# Patient Record
Sex: FEMALE | Race: BLACK OR AFRICAN AMERICAN | NOT HISPANIC OR LATINO | URBAN - METROPOLITAN AREA
[De-identification: names, ages, dates, MRNs, and addresses within clinical notes are randomized per-mention and may not be internally consistent; named-entity substitution may affect disease eponyms.]

---

## 2018-02-12 ENCOUNTER — EMERGENCY (EMERGENCY)
Facility: HOSPITAL | Age: 43
LOS: 0 days | Discharge: HOME | End: 2018-02-13
Attending: EMERGENCY MEDICINE
Payer: SELF-PAY

## 2018-02-12 VITALS
TEMPERATURE: 100 F | RESPIRATION RATE: 18 BRPM | DIASTOLIC BLOOD PRESSURE: 97 MMHG | OXYGEN SATURATION: 98 % | HEART RATE: 110 BPM | SYSTOLIC BLOOD PRESSURE: 151 MMHG

## 2018-02-12 VITALS
HEART RATE: 100 BPM | SYSTOLIC BLOOD PRESSURE: 155 MMHG | OXYGEN SATURATION: 100 % | WEIGHT: 199.96 LBS | RESPIRATION RATE: 18 BRPM | TEMPERATURE: 99 F | HEIGHT: 66 IN | DIASTOLIC BLOOD PRESSURE: 111 MMHG

## 2018-02-12 DIAGNOSIS — B34.9 VIRAL INFECTION, UNSPECIFIED: ICD-10-CM

## 2018-02-12 DIAGNOSIS — R05 COUGH: ICD-10-CM

## 2018-02-12 DIAGNOSIS — I10 ESSENTIAL (PRIMARY) HYPERTENSION: ICD-10-CM

## 2018-02-12 DIAGNOSIS — R50.9 FEVER, UNSPECIFIED: ICD-10-CM

## 2018-02-12 DIAGNOSIS — J02.9 ACUTE PHARYNGITIS, UNSPECIFIED: ICD-10-CM

## 2018-02-12 DIAGNOSIS — E11.9 TYPE 2 DIABETES MELLITUS WITHOUT COMPLICATIONS: ICD-10-CM

## 2018-02-12 LAB
APPEARANCE UR: CLEAR — SIGNIFICANT CHANGE UP
BILIRUB UR-MCNC: NEGATIVE — SIGNIFICANT CHANGE UP
COLOR SPEC: YELLOW — SIGNIFICANT CHANGE UP
DIFF PNL FLD: NEGATIVE — SIGNIFICANT CHANGE UP
FLUAV SPEC QL CULT: NEGATIVE — SIGNIFICANT CHANGE UP
FLUBV AG SPEC QL IA: NEGATIVE — SIGNIFICANT CHANGE UP
GLUCOSE UR QL: 1000 MG/DL
HCG UR QL: NEGATIVE — SIGNIFICANT CHANGE UP
KETONES UR-MCNC: NEGATIVE — SIGNIFICANT CHANGE UP
LEUKOCYTE ESTERASE UR-ACNC: NEGATIVE — SIGNIFICANT CHANGE UP
NITRITE UR-MCNC: NEGATIVE — SIGNIFICANT CHANGE UP
PH UR: 6.5 — SIGNIFICANT CHANGE UP (ref 5–8)
PROT UR-MCNC: 100 MG/DL
SP GR SPEC: 1.01 — SIGNIFICANT CHANGE UP (ref 1.01–1.02)
UROBILINOGEN FLD QL: NEGATIVE MG/DL — SIGNIFICANT CHANGE UP

## 2018-02-12 PROCEDURE — 99284 EMERGENCY DEPT VISIT MOD MDM: CPT

## 2018-02-12 PROCEDURE — 71046 X-RAY EXAM CHEST 2 VIEWS: CPT | Mod: 26

## 2018-02-12 RX ORDER — DEXAMETHASONE 0.5 MG/5ML
10 ELIXIR ORAL ONCE
Qty: 0 | Refills: 0 | Status: COMPLETED | OUTPATIENT
Start: 2018-02-12 | End: 2018-02-12

## 2018-02-12 RX ORDER — IBUPROFEN 200 MG
600 TABLET ORAL ONCE
Qty: 0 | Refills: 0 | Status: COMPLETED | OUTPATIENT
Start: 2018-02-12 | End: 2018-02-12

## 2018-02-12 RX ADMIN — Medication 10 MILLIGRAM(S): at 23:03

## 2018-02-12 RX ADMIN — Medication 75 MILLIGRAM(S): at 23:02

## 2018-02-13 LAB
BACTERIA # UR AUTO: ABNORMAL
EPI CELLS # UR: SIGNIFICANT CHANGE UP
HYALINE CASTS # UR AUTO: ABNORMAL /LPF
RBC CASTS # UR COMP ASSIST: SIGNIFICANT CHANGE UP /HPF (ref 0–4)
WBC UR QL: SIGNIFICANT CHANGE UP

## 2018-02-13 NOTE — ED PROVIDER NOTE - MEDICAL DECISION MAKING DETAILS
Labs and imaging reviewed. Patient received tylenol, decadron and tamiflu. She currently feels well and wants to go home. Patient now discharged with care instructions. Patient is to follow up with pmd. Discussed results and outcome of testing with the patient.  Patient advised to please follow up with their primary care doctor within the next 24 hours and return to the Emergency Department for worsening symptoms or any other concerns.  Patient advised that their doctor may call  to follow up on the specific results of the tests performed today in the emergency department.

## 2018-02-13 NOTE — ED PROVIDER NOTE - OBJECTIVE STATEMENT
Pertinent PMH/PSH/FHx/SHx and Review of Systems contained within:  41 yo f with PMH of DM and HTN presents in ED c/o fevers, chills, cough, sore throat and bodyaches. No aggravating or relieving factors, No photophobia/eye pain/changes in vision, No ear pain/dysphagia, No chest pain/palpitations, no SOB/wheeze/stridor, No abdominal pain, No N/V/D, no dysuria/frequency/discharge, No neck/back pain, no rash, no changes in neurological status/function.

## 2018-02-15 LAB
-  AMIKACIN: SIGNIFICANT CHANGE UP
-  AMIKACIN: SIGNIFICANT CHANGE UP
-  AMPICILLIN/SULBACTAM: SIGNIFICANT CHANGE UP
-  AMPICILLIN/SULBACTAM: SIGNIFICANT CHANGE UP
-  AMPICILLIN: SIGNIFICANT CHANGE UP
-  AMPICILLIN: SIGNIFICANT CHANGE UP
-  AZTREONAM: SIGNIFICANT CHANGE UP
-  AZTREONAM: SIGNIFICANT CHANGE UP
-  CEFAZOLIN: SIGNIFICANT CHANGE UP
-  CEFAZOLIN: SIGNIFICANT CHANGE UP
-  CEFEPIME: SIGNIFICANT CHANGE UP
-  CEFEPIME: SIGNIFICANT CHANGE UP
-  CEFOXITIN: SIGNIFICANT CHANGE UP
-  CEFOXITIN: SIGNIFICANT CHANGE UP
-  CEFTAZIDIME: SIGNIFICANT CHANGE UP
-  CEFTAZIDIME: SIGNIFICANT CHANGE UP
-  CEFTRIAXONE: SIGNIFICANT CHANGE UP
-  CEFTRIAXONE: SIGNIFICANT CHANGE UP
-  CIPROFLOXACIN: SIGNIFICANT CHANGE UP
-  CIPROFLOXACIN: SIGNIFICANT CHANGE UP
-  ERTAPENEM: SIGNIFICANT CHANGE UP
-  ERTAPENEM: SIGNIFICANT CHANGE UP
-  GENTAMICIN: SIGNIFICANT CHANGE UP
-  GENTAMICIN: SIGNIFICANT CHANGE UP
-  IMIPENEM: SIGNIFICANT CHANGE UP
-  IMIPENEM: SIGNIFICANT CHANGE UP
-  LEVOFLOXACIN: SIGNIFICANT CHANGE UP
-  LEVOFLOXACIN: SIGNIFICANT CHANGE UP
-  MEROPENEM: SIGNIFICANT CHANGE UP
-  MEROPENEM: SIGNIFICANT CHANGE UP
-  NITROFURANTOIN: SIGNIFICANT CHANGE UP
-  NITROFURANTOIN: SIGNIFICANT CHANGE UP
-  PIPERACILLIN/TAZOBACTAM: SIGNIFICANT CHANGE UP
-  PIPERACILLIN/TAZOBACTAM: SIGNIFICANT CHANGE UP
-  TOBRAMYCIN: SIGNIFICANT CHANGE UP
-  TOBRAMYCIN: SIGNIFICANT CHANGE UP
-  TRIMETHOPRIM/SULFAMETHOXAZOLE: SIGNIFICANT CHANGE UP
-  TRIMETHOPRIM/SULFAMETHOXAZOLE: SIGNIFICANT CHANGE UP
CULTURE RESULTS: SIGNIFICANT CHANGE UP
METHOD TYPE: SIGNIFICANT CHANGE UP
METHOD TYPE: SIGNIFICANT CHANGE UP
ORGANISM # SPEC MICROSCOPIC CNT: SIGNIFICANT CHANGE UP
SPECIMEN SOURCE: SIGNIFICANT CHANGE UP

## 2020-02-28 ENCOUNTER — EMERGENCY (EMERGENCY)
Facility: HOSPITAL | Age: 45
LOS: 1 days | Discharge: HOME | End: 2020-02-28
Attending: EMERGENCY MEDICINE
Payer: SELF-PAY

## 2020-02-28 VITALS
DIASTOLIC BLOOD PRESSURE: 99 MMHG | SYSTOLIC BLOOD PRESSURE: 200 MMHG | HEART RATE: 99 BPM | OXYGEN SATURATION: 98 % | WEIGHT: 218.04 LBS | RESPIRATION RATE: 16 BRPM | TEMPERATURE: 98 F

## 2020-02-28 VITALS — SYSTOLIC BLOOD PRESSURE: 185 MMHG | DIASTOLIC BLOOD PRESSURE: 128 MMHG | HEART RATE: 93 BPM

## 2020-02-28 PROBLEM — E11.9 TYPE 2 DIABETES MELLITUS WITHOUT COMPLICATIONS: Chronic | Status: ACTIVE | Noted: 2018-02-12

## 2020-02-28 PROBLEM — I10 ESSENTIAL (PRIMARY) HYPERTENSION: Chronic | Status: ACTIVE | Noted: 2018-02-12

## 2020-02-28 PROCEDURE — 99283 EMERGENCY DEPT VISIT LOW MDM: CPT

## 2020-02-28 PROCEDURE — 73140 X-RAY EXAM OF FINGER(S): CPT | Mod: 26,LT

## 2020-02-28 PROCEDURE — 73140 X-RAY EXAM OF FINGER(S): CPT

## 2020-02-28 PROCEDURE — 99283 EMERGENCY DEPT VISIT LOW MDM: CPT | Mod: 25

## 2020-02-28 RX ORDER — ACETAMINOPHEN 500 MG
975 TABLET ORAL ONCE
Refills: 0 | Status: COMPLETED | OUTPATIENT
Start: 2020-02-28 | End: 2020-02-28

## 2020-02-28 RX ADMIN — Medication 975 MILLIGRAM(S): at 13:57

## 2020-02-28 RX ADMIN — Medication 975 MILLIGRAM(S): at 13:27

## 2020-02-28 NOTE — ED PROVIDER NOTE - CROS ED NEURO NEG
no numbness, no tingling no difficulty walking/imbalance/no seizures/no change in level of consciousness/no headache/no numbness, no tingling

## 2020-02-28 NOTE — ED PROVIDER NOTE - MUSCULOSKELETAL MINIMAL EXAM
swelling and pain to distal aspect of L 3rd finger, tenderness to palpation over distal phalanx of L 3rd finger, no lac or disruption of nail bed, pt able to flex and extend finger against resistance

## 2020-02-28 NOTE — ED PROVIDER NOTE - OBJECTIVE STATEMENT
43 y/o female with PMHx of HTN, DM, peptic ulcer disease presents to the ED c/o L 3rd finger pain x today. Pt notes she was cleaning the armrest of a plane when the armrest came down and crushed hi L 3rd finger, causing pain. Pt notes severe pain to the distal aspect of her L 3rd finger. No other injuries noted. Pt denies numbness, tingling, or any other complaints. NKDA. 43 y/o female with PMHx of HTN, DM, peptic ulcer disease presents to the ED c/o L 3rd finger pain x today. Pt notes she was cleaning the armrest of a plane when the armrest came down and crushed her L 3rd finger, causing pain. Pt notes severe pain to the distal aspect of her L 3rd finger. No other injuries noted. Pt denies numbness, tingling, or any other complaints. NKDA.

## 2020-02-28 NOTE — ED PROVIDER NOTE - PROGRESS NOTE DETAILS
Patient nontoxic and medically stable for discharge. Results discussed with patient. Return precautions provided and patient understands to return to the ED for concerning or worsening signs and symptoms. Instructed to follow up with primary care physician and agreeable. Patient's questions answered. Patient noted to be hypertensive, but asymptomatic and requesting discharge.  Patient reports pain is better but states "I have bad white coat syndrome".  Will have patient follow-up with PCP for BP control.

## 2020-02-28 NOTE — ED PROVIDER NOTE - NSFOLLOWUPINSTRUCTIONS_ED_ALL_ED_FT
Please follow-up with your primary care provider and return to ED for uncontrolled pain, new or worrisome symptoms.

## 2020-02-28 NOTE — ED PROVIDER NOTE - ATTENDING CONTRIBUTION TO CARE
I completed an independent physical examination.   I have signed out the follow up of any pending tests (i.e. labs, radiological studies) to the PA/NP.  I have discussed the patient’s plan of care and disposition with the PA/NP    Patient with crush injury to 3rd finger. X-ray, analgesia.

## 2020-02-28 NOTE — ED PROVIDER NOTE - PATIENT PORTAL LINK FT
You can access the FollowMyHealth Patient Portal offered by [facility] by registering at the following website: [email or web address]. By joining [facility]’s FollowMyHealth portal, you will also be able to view your health information using other applications (apps) compatible with our system.

## 2022-03-26 ENCOUNTER — INPATIENT (INPATIENT)
Facility: HOSPITAL | Age: 47
LOS: 2 days | Discharge: LEFT BEFORE DISCHARGE | End: 2022-03-29
Attending: STUDENT IN AN ORGANIZED HEALTH CARE EDUCATION/TRAINING PROGRAM | Admitting: STUDENT IN AN ORGANIZED HEALTH CARE EDUCATION/TRAINING PROGRAM
Payer: MEDICAID

## 2022-03-26 VITALS
SYSTOLIC BLOOD PRESSURE: 117 MMHG | WEIGHT: 205.03 LBS | TEMPERATURE: 97 F | HEIGHT: 66 IN | HEART RATE: 93 BPM | DIASTOLIC BLOOD PRESSURE: 72 MMHG | RESPIRATION RATE: 17 BRPM | OXYGEN SATURATION: 95 %

## 2022-03-26 PROBLEM — K27.9 PEPTIC ULCER, SITE UNSPECIFIED, UNSPECIFIED AS ACUTE OR CHRONIC, WITHOUT HEMORRHAGE OR PERFORATION: Chronic | Status: ACTIVE | Noted: 2020-02-28

## 2022-03-26 LAB
ALBUMIN SERPL ELPH-MCNC: 2.7 G/DL — LOW (ref 3.3–5)
ALP SERPL-CCNC: 92 U/L — SIGNIFICANT CHANGE UP (ref 40–120)
ALT FLD-CCNC: 30 U/L — SIGNIFICANT CHANGE UP (ref 12–78)
ANION GAP SERPL CALC-SCNC: 5 MMOL/L — SIGNIFICANT CHANGE UP (ref 5–17)
APPEARANCE UR: CLEAR — SIGNIFICANT CHANGE UP
APTT BLD: 29.7 SEC — SIGNIFICANT CHANGE UP (ref 27.5–35.5)
AST SERPL-CCNC: 22 U/L — SIGNIFICANT CHANGE UP (ref 15–37)
BASOPHILS # BLD AUTO: 0.03 K/UL — SIGNIFICANT CHANGE UP (ref 0–0.2)
BASOPHILS NFR BLD AUTO: 0.3 % — SIGNIFICANT CHANGE UP (ref 0–2)
BILIRUB SERPL-MCNC: 0.8 MG/DL — SIGNIFICANT CHANGE UP (ref 0.2–1.2)
BILIRUB UR-MCNC: NEGATIVE — SIGNIFICANT CHANGE UP
BUN SERPL-MCNC: 36 MG/DL — HIGH (ref 7–23)
CALCIUM SERPL-MCNC: 8.7 MG/DL — SIGNIFICANT CHANGE UP (ref 8.5–10.1)
CHLORIDE SERPL-SCNC: 106 MMOL/L — SIGNIFICANT CHANGE UP (ref 96–108)
CO2 SERPL-SCNC: 29 MMOL/L — SIGNIFICANT CHANGE UP (ref 22–31)
COLOR SPEC: YELLOW — SIGNIFICANT CHANGE UP
CREAT SERPL-MCNC: 1.84 MG/DL — HIGH (ref 0.5–1.3)
D DIMER BLD IA.RAPID-MCNC: 678 NG/ML DDU — HIGH
DIFF PNL FLD: NEGATIVE — SIGNIFICANT CHANGE UP
EGFR: 34 ML/MIN/1.73M2 — LOW
EOSINOPHIL # BLD AUTO: 0.1 K/UL — SIGNIFICANT CHANGE UP (ref 0–0.5)
EOSINOPHIL NFR BLD AUTO: 0.9 % — SIGNIFICANT CHANGE UP (ref 0–6)
EPI CELLS # UR: SIGNIFICANT CHANGE UP
FLUAV AG NPH QL: SIGNIFICANT CHANGE UP
FLUBV AG NPH QL: SIGNIFICANT CHANGE UP
GLUCOSE SERPL-MCNC: 164 MG/DL — HIGH (ref 70–99)
GLUCOSE UR QL: NEGATIVE MG/DL — SIGNIFICANT CHANGE UP
HCG SERPL-ACNC: 1 MIU/ML — SIGNIFICANT CHANGE UP
HCT VFR BLD CALC: 30.7 % — LOW (ref 34.5–45)
HGB BLD-MCNC: 10 G/DL — LOW (ref 11.5–15.5)
IMM GRANULOCYTES NFR BLD AUTO: 0.4 % — SIGNIFICANT CHANGE UP (ref 0–1.5)
INR BLD: 1.18 RATIO — HIGH (ref 0.88–1.16)
KETONES UR-MCNC: NEGATIVE — SIGNIFICANT CHANGE UP
LEUKOCYTE ESTERASE UR-ACNC: NEGATIVE — SIGNIFICANT CHANGE UP
LIDOCAIN IGE QN: 65 U/L — LOW (ref 73–393)
LYMPHOCYTES # BLD AUTO: 2.94 K/UL — SIGNIFICANT CHANGE UP (ref 1–3.3)
LYMPHOCYTES # BLD AUTO: 27 % — SIGNIFICANT CHANGE UP (ref 13–44)
MCHC RBC-ENTMCNC: 24 PG — LOW (ref 27–34)
MCHC RBC-ENTMCNC: 32.6 G/DL — SIGNIFICANT CHANGE UP (ref 32–36)
MCV RBC AUTO: 73.6 FL — LOW (ref 80–100)
MONOCYTES # BLD AUTO: 0.49 K/UL — SIGNIFICANT CHANGE UP (ref 0–0.9)
MONOCYTES NFR BLD AUTO: 4.5 % — SIGNIFICANT CHANGE UP (ref 2–14)
NEUTROPHILS # BLD AUTO: 7.27 K/UL — SIGNIFICANT CHANGE UP (ref 1.8–7.4)
NEUTROPHILS NFR BLD AUTO: 66.9 % — SIGNIFICANT CHANGE UP (ref 43–77)
NITRITE UR-MCNC: NEGATIVE — SIGNIFICANT CHANGE UP
NRBC # BLD: 0 /100 WBCS — SIGNIFICANT CHANGE UP (ref 0–0)
NT-PROBNP SERPL-SCNC: HIGH PG/ML (ref 0–125)
PH UR: 6 — SIGNIFICANT CHANGE UP (ref 5–8)
PLATELET # BLD AUTO: 257 K/UL — SIGNIFICANT CHANGE UP (ref 150–400)
POTASSIUM SERPL-MCNC: 4.2 MMOL/L — SIGNIFICANT CHANGE UP (ref 3.5–5.3)
POTASSIUM SERPL-SCNC: 4.2 MMOL/L — SIGNIFICANT CHANGE UP (ref 3.5–5.3)
PROT SERPL-MCNC: 7 GM/DL — SIGNIFICANT CHANGE UP (ref 6–8.3)
PROT UR-MCNC: 30 MG/DL
PROTHROM AB SERPL-ACNC: 14.1 SEC — HIGH (ref 10.5–13.4)
RBC # BLD: 4.17 M/UL — SIGNIFICANT CHANGE UP (ref 3.8–5.2)
RBC # FLD: 19 % — HIGH (ref 10.3–14.5)
SARS-COV-2 RNA SPEC QL NAA+PROBE: SIGNIFICANT CHANGE UP
SODIUM SERPL-SCNC: 140 MMOL/L — SIGNIFICANT CHANGE UP (ref 135–145)
SP GR SPEC: 1.01 — SIGNIFICANT CHANGE UP (ref 1.01–1.02)
TROPONIN I, HIGH SENSITIVITY RESULT: 46.6 NG/L — SIGNIFICANT CHANGE UP
UROBILINOGEN FLD QL: NEGATIVE MG/DL — SIGNIFICANT CHANGE UP
WBC # BLD: 10.87 K/UL — HIGH (ref 3.8–10.5)
WBC # FLD AUTO: 10.87 K/UL — HIGH (ref 3.8–10.5)

## 2022-03-26 PROCEDURE — 93010 ELECTROCARDIOGRAM REPORT: CPT

## 2022-03-26 PROCEDURE — 99285 EMERGENCY DEPT VISIT HI MDM: CPT

## 2022-03-26 PROCEDURE — 74176 CT ABD & PELVIS W/O CONTRAST: CPT | Mod: 26,MA

## 2022-03-26 PROCEDURE — 71045 X-RAY EXAM CHEST 1 VIEW: CPT | Mod: 26

## 2022-03-26 PROCEDURE — 76856 US EXAM PELVIC COMPLETE: CPT | Mod: 26

## 2022-03-26 PROCEDURE — 71250 CT THORAX DX C-: CPT | Mod: 26,MA

## 2022-03-26 PROCEDURE — 99223 1ST HOSP IP/OBS HIGH 75: CPT

## 2022-03-26 RX ORDER — FUROSEMIDE 40 MG
40 TABLET ORAL DAILY
Refills: 0 | Status: DISCONTINUED | OUTPATIENT
Start: 2022-03-27 | End: 2022-03-29

## 2022-03-26 RX ORDER — HEPARIN SODIUM 5000 [USP'U]/ML
5000 INJECTION INTRAVENOUS; SUBCUTANEOUS EVERY 12 HOURS
Refills: 0 | Status: DISCONTINUED | OUTPATIENT
Start: 2022-03-26 | End: 2022-03-28

## 2022-03-26 RX ORDER — MORPHINE SULFATE 50 MG/1
4 CAPSULE, EXTENDED RELEASE ORAL ONCE
Refills: 0 | Status: DISCONTINUED | OUTPATIENT
Start: 2022-03-26 | End: 2022-03-26

## 2022-03-26 RX ORDER — FUROSEMIDE 40 MG
80 TABLET ORAL ONCE
Refills: 0 | Status: COMPLETED | OUTPATIENT
Start: 2022-03-26 | End: 2022-03-26

## 2022-03-26 RX ORDER — FAMOTIDINE 10 MG/ML
20 INJECTION INTRAVENOUS ONCE
Refills: 0 | Status: COMPLETED | OUTPATIENT
Start: 2022-03-26 | End: 2022-03-26

## 2022-03-26 RX ADMIN — Medication 2 MILLIGRAM(S): at 16:09

## 2022-03-26 RX ADMIN — Medication 1 MILLIGRAM(S): at 14:47

## 2022-03-26 RX ADMIN — Medication 30 MILLILITER(S): at 12:24

## 2022-03-26 RX ADMIN — FAMOTIDINE 20 MILLIGRAM(S): 10 INJECTION INTRAVENOUS at 12:24

## 2022-03-26 RX ADMIN — Medication 80 MILLIGRAM(S): at 14:42

## 2022-03-26 RX ADMIN — MORPHINE SULFATE 4 MILLIGRAM(S): 50 CAPSULE, EXTENDED RELEASE ORAL at 12:24

## 2022-03-26 NOTE — ED ADULT NURSE REASSESSMENT NOTE - NS ED NURSE REASSESS COMMENT FT1
mg of IV Ativan given to Dr. Dubois for this patient_  went to [facility] with medication.

## 2022-03-26 NOTE — ED PROVIDER NOTE - NS ED ATTENDING STATEMENT MOD
Attending with This was a shared visit with the TR. I reviewed and verified the documentation and independently performed the documented:

## 2022-03-26 NOTE — ED PROVIDER NOTE - PHYSICAL EXAMINATION
PE:   GEN: Awake, alert, interactive,  HEAD AND NECK: NC/AT. Airway patent. Neck supple.   EYES: Clear b/l. PERRL  CARDIAC: RRR. S1, S2. No evident pedal edema.    RESP: Tachypneic. Clear throughout on auscultation.  ABD: soft, non-distended, + epigastric ttp. No rebound, no guarding.   NEURO: AOx3, CN II-XII grossly intact, no focal deficits.   MSK: Moving all extremities with no apparent deformities.   SKIN: Warm, dry, intact normal color

## 2022-03-26 NOTE — H&P ADULT - NSHPPHYSICALEXAM_GEN_ALL_CORE
PHYSICAL EXAMINATION:  Vital Signs Last 24 Hrs  T(C): 36.2 (26 Mar 2022 11:32), Max: 36.2 (26 Mar 2022 11:32)  T(F): 97.2 (26 Mar 2022 11:32), Max: 97.2 (26 Mar 2022 11:32)  HR: 93 (26 Mar 2022 11:32) (93 - 93)  BP: 129/86 (26 Mar 2022 14:38) (117/72 - 129/86)  BP(mean): --  RR: 17 (26 Mar 2022 11:32) (17 - 17)  SpO2: 95% (26 Mar 2022 11:32) (95% - 95%)  CAPILLARY BLOOD GLUCOSE          GENERAL: NAD, well-groomed, well-developed  HEAD:  atraumatic, normocephalic  EYES: sclera anicteric  ENMT: mucous membranes moist  NECK: supple, No JVD  CHEST/LUNG: clear to auscultation bilaterally; no rales, rhonchi, or wheezing b/l  HEART: normal S1, S2  ABDOMEN: BS+, soft, ND, NT   EXTREMITIES:  pulses palpable; no clubbing, cyanosis, or edema b/l LEs  NEURO: awake, alert, interactive; moves all extremities  SKIN: no rashes or lesions PHYSICAL EXAMINATION:  Vital Signs Last 24 Hrs  T(C): 36.2 (26 Mar 2022 11:32), Max: 36.2 (26 Mar 2022 11:32)  T(F): 97.2 (26 Mar 2022 11:32), Max: 97.2 (26 Mar 2022 11:32)  HR: 93 (26 Mar 2022 11:32) (93 - 93)  BP: 129/86 (26 Mar 2022 14:38) (117/72 - 129/86)  BP(mean): --  RR: 17 (26 Mar 2022 11:32) (17 - 17)  SpO2: 95% (26 Mar 2022 11:32) (95% - 95%)  CAPILLARY BLOOD GLUCOSE          GENERAL: NAD, seen on 1-D, comfortable, sedated after pelvic sonogram.   HEAD:  atraumatic, normocephalic  EYES: sclera anicteric  ENMT: mucous membranes moist  NECK: supple, No JVD  CHEST/LUNG: clear to auscultation bilaterally; no rales, rhonchi, or wheezing b/l  HEART: normal S1, S2  ABDOMEN: BS+, soft, ND, NT   EXTREMITIES:  pulses palpable; no clubbing, cyanosis, or edema b/l LEs  NEURO: awake, alert, interactive; moves all extremities  SKIN: no rashes or lesions

## 2022-03-26 NOTE — PATIENT PROFILE ADULT - FALL HARM RISK - UNIVERSAL INTERVENTIONS
Bed in lowest position, wheels locked, appropriate side rails in place/Call bell, personal items and telephone in reach/Instruct patient to call for assistance before getting out of bed or chair/Non-slip footwear when patient is out of bed/[place] to call system/Physically safe environment - no spills, clutter or unnecessary equipment/Purposeful Proactive Rounding/Room/bathroom lighting operational, light cord in reach

## 2022-03-26 NOTE — ED PROVIDER NOTE - CLINICAL SUMMARY MEDICAL DECISION MAKING FREE TEXT BOX
45yo female with pmh of DM, HTN, CHF presents complaining of intermittent sharp 10/10 epigastric pain x 2 months. Afebrile. + epigastric ttp - concerning for gastritis vs pud. SOB and tachypneic concerning for cardiac etiology - will get trop and BNP. Dispo pending work up

## 2022-03-26 NOTE — ED PROVIDER NOTE - OBJECTIVE STATEMENT
47yo female with pmh of DM, HTN, CHF presents complaining of intermittent sharp 10/10 epigastric pain x 2 months. Denies provoking or alleviating factors. Has not seen a doctor for it yet. Reports that over the last three days the pain has been constant and associated with sob prompting ED visit. Reports also having orthopnea over the last two days. Denies LE swelling, dizziness, n/v/d, bloody stools, constipation, fevers/chills and other associated sx.

## 2022-03-26 NOTE — ED ADULT NURSE NOTE - OBJECTIVE STATEMENT
abd pain since 01/15 no nausea or vomiting getting worse . pain In epigastric area constant pain, nonradiating  , difficulty breathing starting 3 day ago. hx dm chf htn

## 2022-03-26 NOTE — PATIENT PROFILE ADULT - OVER THE PAST TWO WEEKS, HAVE YOU FELT LITTLE INTEREST OR PLEASURE IN DOING THINGS?
MA attempt to contact patient to schedule follow up appointment patient canceled. MA reached patient VM and left detailed message of why was calling and office number for patient to call office back and set up appt.         Electronically signed by Michael Wright MA on 1/26/22 at 3:23 PM EST
no

## 2022-03-26 NOTE — ED PROVIDER NOTE - NS ED ROS FT
Constitutional: (-) Fever, (-) Anorexia, (-) Generalized Malaise  Eyes: (-)Discharge, (-) Irritation,  (-) Visual changes  EARS: (-) Ear Pain, (-) Apparent hearing changes  NOSE: (-) Congestion, (-) Bloody nose  MOUTH/THROAT: (-) Vocal Changes, (-) Drooling, (-) Sore throat  NECK: (-) Lumps, (-) Stiffness, (-) Pain  CV: (-) Chest Pain, (-) Palpitations, (-) Edema   RESP:  (-) Cough, (+) SOB, (-) HAINES,  (-) Wheezing  GI: (-) Nausea, (-) Vomiting, (+) Abdominal Pain, (-) Diarrhea, (-) Constipation, (-) Bloody stools  : (-) Dysuria, (-) Frequency, (-) Hematuria, (-) Incontinence  MSK: (-) Joint Pain, (-) Back Pain, (-) Deformities  SKIN: (-) Wounds, (-) Color change, (-)Rash, (-) Swelling  NEURO:(-) Headache, (-) Dizziness, (-) Numbness/Tingling,  (-)LOC

## 2022-03-26 NOTE — CONSULT NOTE ADULT - SUBJECTIVE AND OBJECTIVE BOX
Patient chart reviewed, full consult to follow.     Suspected cardiorenal syndrome;   Hx of CM EF 30% on lasix maintenance.       Thank you for the courtesy of this consultation. [facility] NE[facility], [facility]  NEPHROLOGY AND HYPERTENSION  [address]  [address]  [place] [address]  [phone]    MD RENEE VITAL MD ANDREY GONCHARUK, MD MADHU KORRAPATI, MD YELENA ROSENBERG, MD BINNY KOSHY, MD CHRISTOPHER CAPUTO, MD EDWARD BOVER, MD      Information from chart:  "Patient is a 46y old  Female who presents with a chief complaint of SOB and chest pain. (26 Mar 2022 15:23)    HPI:  45yo female PMH DM, HTN, CHF presents complaining of intermittent sharp 10/10 epigastric pain x 2 months. Denies provoking or alleviating factors. Has not seen a doctor for it yet. Reports that over the last three days the chest pain has been constant and associated with sob prompting ED visit. Reports also having orthopnea over the last two days. Denies LE swelling, dizziness, n/v/d, bloody stools, constipation, fevers/chills and other associated sx. (26 Mar 2022 15:23)   "      Patient with recent admission to [facility] ; Noted Echo EF 30%   Increasing SOB  On maintenance diuretic regimen   LE edema     PAST MEDICAL & SURGICAL HISTORY:  Hypertension 10 + years    Diabetes  4-5 years    Peptic ulcer disease      FAMILY HISTORY:    Allergies    lisinopril (Other)    Intolerances      Home Medications:    MEDICATIONS  (STANDING):  furosemide   Injectable 40 milliGRAM(s) IV Push daily  heparin   Injectable 5000 Unit(s) SubCutaneous every 12 hours    MEDICATIONS  (PRN):    Vital Signs Last 24 Hrs  T(C): 36.6 (26 Mar 2022 21:00), Max: 36.8 (26 Mar 2022 19:04)  T(F): 97.8 (26 Mar 2022 21:00), Max: 98.3 (26 Mar 2022 19:04)  HR: 91 (26 Mar 2022 21:00) (78 - 94)  BP: 139/99 (26 Mar 2022 21:00) (116/79 - 139/99)  BP(mean): 102 (26 Mar 2022 20:16) (102 - 102)  RR: 20 (26 Mar 2022 21:00) (17 - 20)  SpO2: 100% (26 Mar 2022 21:00) (95% - 100%)    Daily Height in cm: 167.64 (26 Mar 2022 11:32)    Daily     CAPILLARY BLOOD GLUCOSE        PHYSICAL EXAM:      T(C): 36.6 (22 @ 21:00), Max: 36.8 (22 @ 19:04)  HR: 91 (22 @ 21:00) (78 - 94)  BP: 139/99 (22 @ 21:00) (116/79 - 139/99)  RR: 20 (22 @ 21:00) (17 - 20)  SpO2: 100% (22 @ 21:00) (95% - 100%)  Wt(kg): --  Lungs clear decreased  BS at bases   Heart S1S2  Abd soft NT ND  Extremities:   1-2  edema                  140  |  106  |  36<H>  ----------------------------<  164<H>  4.2   |  29  |  1.84<H>    Ca    8.7      26 Mar 2022 13:02    TPro  7.0  /  Alb  2.7<L>  /  TBili  0.8  /  DBili  x   /  AST  22  /  ALT  30  /  AlkPhos  92                            10.0   10.87 )-----------( 257      ( 26 Mar 2022 13:02 )             30.7     Creatinine Trend: 1.84<--  Urinalysis Basic - ( 26 Mar 2022 20:37 )    Color: Yellow / Appearance: Clear / S.015 / pH: x  Gluc: x / Ketone: Negative  / Bili: Negative / Urobili: Negative mg/dL   Blood: x / Protein: 30 mg/dL / Nitrite: Negative   Leuk Esterase: Negative / RBC: x / WBC x   Sq Epi: x / Non Sq Epi: Few / Bacteria: x            Assessment   Suspected cardiorenal syndrome;   Hx of CM EF 30% on lasix maintenance.   Bow UA micro     Plan  Continued warranted diuresis;   Cardiology evaluation     Jamaal Elkins MD        Patient chart reviewed, full consult to follow.         Thank you for the courtesy of this consultation.

## 2022-03-26 NOTE — H&P ADULT - ASSESSMENT
47yo female PMH DM, HTN, CHF presents complaining of intermittent sharp 10/10 epigastric pain x 2 months. Denies provoking or alleviating factors. Has not seen a doctor for it yet. Reports that over the last three days the chest pain has been constant and associated with sob prompting ED visit. Reports also having orthopnea over the last two days. Denies LE swelling, dizziness, n/v/d, bloody stools, constipation, fevers/chills and other associated sx.    Plan: Admit to tele, likely systolic CHF exacerbation. Lasix 40 mg every AM, follow SMA-7 daily. TTE ordered. Renal consul reviewed in full and   appreciated. CXR is clear but CT chest notes PVC. BNP elevated on arrival at 11,890 consistent with CHF.     Renal : Creatinine appears at baseline 1.82, follow in AM.     DM: Glucose 162, SSC and A1C in AM.     Will need to confirm meds with patient in AM as she was sedated for pelvic sonogram.      47yo female PMH DM, HTN, CHF presents complaining of intermittent sharp 10/10 epigastric pain x 2 months. Denies provoking or alleviating factors. Has not seen a doctor for it yet. Reports that over the last three days the chest pain has been constant and associated with sob prompting ED visit. Reports also having orthopnea over the last two days. Denies LE swelling, dizziness, n/v/d, bloody stools, constipation, fevers/chills and other associated sx.    Plan: Admit to tele, likely systolic CHF exacerbation. Lasix 40 mg every AM, follow SMA-7 daily. TTE ordered. Renal consul reviewed in full and   appreciated. CXR is clear but CT chest notes PVC. BNP elevated on arrival at 11,890 consistent with CHF. Trop normal ranges at 46, follow in AM.     Renal : Creatinine appears at baseline 1.82, follow in AM.     DM: Glucose 162, SSC and A1C in AM.     Will need to confirm meds with patient in AM as she was sedated for pelvic sonogram, noted only pelvic cyst.

## 2022-03-26 NOTE — H&P ADULT - HISTORY OF PRESENT ILLNESS
45yo female PMH DM, HTN, CHF presents complaining of intermittent sharp 10/10 epigastric pain x 2 months. Denies provoking or alleviating factors. Has not seen a doctor for it yet. Reports that over the last three days the chest pain has been constant and associated with sob prompting ED visit. Reports also having orthopnea over the last two days. Denies LE swelling, dizziness, n/v/d, bloody stools, constipation, fevers/chills and other associated sx.

## 2022-03-26 NOTE — ED PROVIDER NOTE - CARE PLAN
1 Principal Discharge DX:	Abdominal pain   Principal Discharge DX:	Acute on chronic systolic congestive heart failure  Secondary Diagnosis:	Acute renal failure  Secondary Diagnosis:	Abdominal pain

## 2022-03-26 NOTE — ED PROVIDER NOTE - ATTENDING CONTRIBUTION TO CARE
46 years old female by ems c/o epigastric sharp abd pain without nausea vomiting, chest pain or back pain. and sometime increase with food intake for 2 months pt has a hx of cholecystectomy 15 years ago. Pt also c/o sob when she is lying down she has to sleep on three and half pillows x 4 days. Pt sts she has a hx of chf take lasix. Pt is speaking in clear full sentences no nasal flaring no shoulders retractions no diaphoresis, s1/s2 normal breath sounds are clear equal bilaterally, + tender to palp epigastric no santiago's sign and no sclera icterus bilaterally, calfs are nontender to palp bilateral calfs. I have personally seen and examined this pt I have fully participated in the care of this pt. I have made amendments to the documentation where appropriate and otherwise agree with the hx, exam, and plans as documented by the ACP. 46 years old female by ems c/o epigastric sharp abd pain without nausea vomiting, chest pain or back pain. and sometime increase with food intake for 2 months pt has a hx of cholecystectomy 15 years ago. Pt also c/o sob when she is lying down she has to sleep on three and half pillows x 4 days. Pt sts she has a hx of chf take lasix. Pt is speaking in clear full sentences no nasal flaring no shoulders retractions no diaphoresis, s1/s2 normal + right jvd, breath sounds + rales at base equal bilaterally, + tender to palp epigastric no santiago's sign and no sclera icterus bilaterally, calfs are nontender to palp bilateral calfs. I have personally seen and examined this pt I have fully participated in the care of this pt. I have made amendments to the documentation where appropriate and otherwise agree with the hx, exam, and plans as documented by the ACP.

## 2022-03-26 NOTE — H&P ADULT - NSHPLABSRESULTS_GEN_ALL_CORE
LABS:                        10.0   10.87 )-----------( 257      ( 26 Mar 2022 13:02 )             30.7     03-26    140  |  106  |  36<H>  ----------------------------<  164<H>  4.2   |  29  |  1.84<H>    Ca    8.7      26 Mar 2022 13:02    TPro  7.0  /  Alb  2.7<L>  /  TBili  0.8  /  DBili  x   /  AST  22  /  ALT  30  /  AlkPhos  92  03-26    PT/INR - ( 26 Mar 2022 13:02 )   PT: 14.1 sec;   INR: 1.18 ratio         PTT - ( 26 Mar 2022 13:02 )  PTT:29.7 sec        RADIOLOGY & ADDITIONAL TESTS:

## 2022-03-27 LAB
A1C WITH ESTIMATED AVERAGE GLUCOSE RESULT: 7.1 % — HIGH (ref 4–5.6)
ALBUMIN SERPL ELPH-MCNC: 2.5 G/DL — LOW (ref 3.3–5)
ALP SERPL-CCNC: 89 U/L — SIGNIFICANT CHANGE UP (ref 40–120)
ALT FLD-CCNC: 31 U/L — SIGNIFICANT CHANGE UP (ref 12–78)
ANION GAP SERPL CALC-SCNC: 9 MMOL/L — SIGNIFICANT CHANGE UP (ref 5–17)
AST SERPL-CCNC: 24 U/L — SIGNIFICANT CHANGE UP (ref 15–37)
BILIRUB SERPL-MCNC: 0.5 MG/DL — SIGNIFICANT CHANGE UP (ref 0.2–1.2)
BUN SERPL-MCNC: 37 MG/DL — HIGH (ref 7–23)
CALCIUM SERPL-MCNC: 8.4 MG/DL — LOW (ref 8.5–10.1)
CHLORIDE SERPL-SCNC: 104 MMOL/L — SIGNIFICANT CHANGE UP (ref 96–108)
CHOLEST SERPL-MCNC: 123 MG/DL — SIGNIFICANT CHANGE UP
CO2 SERPL-SCNC: 28 MMOL/L — SIGNIFICANT CHANGE UP (ref 22–31)
CREAT SERPL-MCNC: 2.02 MG/DL — HIGH (ref 0.5–1.3)
EGFR: 30 ML/MIN/1.73M2 — LOW
ESTIMATED AVERAGE GLUCOSE: 157 MG/DL — HIGH (ref 68–114)
GLUCOSE BLDC GLUCOMTR-MCNC: 119 MG/DL — HIGH (ref 70–99)
GLUCOSE BLDC GLUCOMTR-MCNC: 200 MG/DL — HIGH (ref 70–99)
GLUCOSE BLDC GLUCOMTR-MCNC: 201 MG/DL — HIGH (ref 70–99)
GLUCOSE BLDC GLUCOMTR-MCNC: 208 MG/DL — HIGH (ref 70–99)
GLUCOSE BLDC GLUCOMTR-MCNC: 218 MG/DL — HIGH (ref 70–99)
GLUCOSE SERPL-MCNC: 198 MG/DL — HIGH (ref 70–99)
HCT VFR BLD CALC: 29.6 % — LOW (ref 34.5–45)
HDLC SERPL-MCNC: 48 MG/DL — LOW
HGB BLD-MCNC: 9.5 G/DL — LOW (ref 11.5–15.5)
IRON SATN MFR SERPL: 12 % — LOW (ref 14–50)
IRON SATN MFR SERPL: 36 UG/DL — SIGNIFICANT CHANGE UP (ref 30–160)
LIPID PNL WITH DIRECT LDL SERPL: 59 MG/DL — SIGNIFICANT CHANGE UP
MAGNESIUM SERPL-MCNC: 2.6 MG/DL — SIGNIFICANT CHANGE UP (ref 1.6–2.6)
MCHC RBC-ENTMCNC: 24.2 PG — LOW (ref 27–34)
MCHC RBC-ENTMCNC: 32.1 G/DL — SIGNIFICANT CHANGE UP (ref 32–36)
MCV RBC AUTO: 75.5 FL — LOW (ref 80–100)
NON HDL CHOLESTEROL: 75 MG/DL — SIGNIFICANT CHANGE UP
NRBC # BLD: 0 /100 WBCS — SIGNIFICANT CHANGE UP (ref 0–0)
PHOSPHATE SERPL-MCNC: 4.8 MG/DL — HIGH (ref 2.5–4.5)
PLATELET # BLD AUTO: 246 K/UL — SIGNIFICANT CHANGE UP (ref 150–400)
POTASSIUM SERPL-MCNC: 4 MMOL/L — SIGNIFICANT CHANGE UP (ref 3.5–5.3)
POTASSIUM SERPL-SCNC: 4 MMOL/L — SIGNIFICANT CHANGE UP (ref 3.5–5.3)
PROT SERPL-MCNC: 6.4 GM/DL — SIGNIFICANT CHANGE UP (ref 6–8.3)
RBC # BLD: 3.92 M/UL — SIGNIFICANT CHANGE UP (ref 3.8–5.2)
RBC # FLD: 19.2 % — HIGH (ref 10.3–14.5)
SODIUM SERPL-SCNC: 141 MMOL/L — SIGNIFICANT CHANGE UP (ref 135–145)
T4 FREE SERPL-MCNC: 1.8 NG/DL — SIGNIFICANT CHANGE UP (ref 0.9–1.8)
TIBC SERPL-MCNC: 295 UG/DL — SIGNIFICANT CHANGE UP (ref 220–430)
TRIGL SERPL-MCNC: 80 MG/DL — SIGNIFICANT CHANGE UP
TROPONIN I, HIGH SENSITIVITY RESULT: 37.4 NG/L — SIGNIFICANT CHANGE UP
TSH SERPL-MCNC: 1.04 UIU/ML — SIGNIFICANT CHANGE UP (ref 0.36–3.74)
UIBC SERPL-MCNC: 259 UG/DL — SIGNIFICANT CHANGE UP (ref 110–370)
WBC # BLD: 9.85 K/UL — SIGNIFICANT CHANGE UP (ref 3.8–10.5)
WBC # FLD AUTO: 9.85 K/UL — SIGNIFICANT CHANGE UP (ref 3.8–10.5)

## 2022-03-27 PROCEDURE — 93010 ELECTROCARDIOGRAM REPORT: CPT

## 2022-03-27 PROCEDURE — 99223 1ST HOSP IP/OBS HIGH 75: CPT

## 2022-03-27 PROCEDURE — 99233 SBSQ HOSP IP/OBS HIGH 50: CPT

## 2022-03-27 RX ORDER — SUCRALFATE 1 G
1 TABLET ORAL
Refills: 0 | Status: DISCONTINUED | OUTPATIENT
Start: 2022-03-27 | End: 2022-03-28

## 2022-03-27 RX ORDER — PANTOPRAZOLE SODIUM 20 MG/1
40 TABLET, DELAYED RELEASE ORAL
Refills: 0 | Status: DISCONTINUED | OUTPATIENT
Start: 2022-03-27 | End: 2022-03-29

## 2022-03-27 RX ORDER — DEXTROSE 50 % IN WATER 50 %
15 SYRINGE (ML) INTRAVENOUS ONCE
Refills: 0 | Status: DISCONTINUED | OUTPATIENT
Start: 2022-03-27 | End: 2022-03-29

## 2022-03-27 RX ORDER — CARVEDILOL PHOSPHATE 80 MG/1
25 CAPSULE, EXTENDED RELEASE ORAL EVERY 12 HOURS
Refills: 0 | Status: DISCONTINUED | OUTPATIENT
Start: 2022-03-27 | End: 2022-03-29

## 2022-03-27 RX ORDER — DEXTROSE 50 % IN WATER 50 %
25 SYRINGE (ML) INTRAVENOUS ONCE
Refills: 0 | Status: DISCONTINUED | OUTPATIENT
Start: 2022-03-27 | End: 2022-03-29

## 2022-03-27 RX ORDER — ATORVASTATIN CALCIUM 80 MG/1
40 TABLET, FILM COATED ORAL AT BEDTIME
Refills: 0 | Status: DISCONTINUED | OUTPATIENT
Start: 2022-03-27 | End: 2022-03-29

## 2022-03-27 RX ORDER — GLUCAGON INJECTION, SOLUTION 0.5 MG/.1ML
1 INJECTION, SOLUTION SUBCUTANEOUS ONCE
Refills: 0 | Status: DISCONTINUED | OUTPATIENT
Start: 2022-03-27 | End: 2022-03-29

## 2022-03-27 RX ORDER — SODIUM CHLORIDE 9 MG/ML
1000 INJECTION, SOLUTION INTRAVENOUS
Refills: 0 | Status: DISCONTINUED | OUTPATIENT
Start: 2022-03-27 | End: 2022-03-29

## 2022-03-27 RX ORDER — INSULIN LISPRO 100/ML
VIAL (ML) SUBCUTANEOUS
Refills: 0 | Status: DISCONTINUED | OUTPATIENT
Start: 2022-03-27 | End: 2022-03-29

## 2022-03-27 RX ORDER — ACETAMINOPHEN 500 MG
650 TABLET ORAL EVERY 6 HOURS
Refills: 0 | Status: DISCONTINUED | OUTPATIENT
Start: 2022-03-27 | End: 2022-03-29

## 2022-03-27 RX ORDER — DEXTROSE 50 % IN WATER 50 %
12.5 SYRINGE (ML) INTRAVENOUS ONCE
Refills: 0 | Status: DISCONTINUED | OUTPATIENT
Start: 2022-03-27 | End: 2022-03-29

## 2022-03-27 RX ADMIN — HEPARIN SODIUM 5000 UNIT(S): 5000 INJECTION INTRAVENOUS; SUBCUTANEOUS at 05:05

## 2022-03-27 RX ADMIN — Medication 1 GRAM(S): at 13:15

## 2022-03-27 RX ADMIN — Medication 1 GRAM(S): at 17:15

## 2022-03-27 RX ADMIN — CARVEDILOL PHOSPHATE 25 MILLIGRAM(S): 80 CAPSULE, EXTENDED RELEASE ORAL at 05:05

## 2022-03-27 RX ADMIN — CARVEDILOL PHOSPHATE 25 MILLIGRAM(S): 80 CAPSULE, EXTENDED RELEASE ORAL at 17:16

## 2022-03-27 RX ADMIN — Medication 40 MILLIGRAM(S): at 05:05

## 2022-03-27 RX ADMIN — ATORVASTATIN CALCIUM 40 MILLIGRAM(S): 80 TABLET, FILM COATED ORAL at 21:59

## 2022-03-27 RX ADMIN — Medication 1 GRAM(S): at 23:37

## 2022-03-27 RX ADMIN — PANTOPRAZOLE SODIUM 40 MILLIGRAM(S): 20 TABLET, DELAYED RELEASE ORAL at 09:14

## 2022-03-27 NOTE — PROGRESS NOTE ADULT - ASSESSMENT
45yo female PMH DM, HTN, CHF presents complaining of intermittent sharp 10/10 epigastric pain x 2 months. Denies provoking or alleviating factors. Has not seen a doctor for it yet. Reports that over the last three days the chest pain has been constant and associated with sob prompting ED visit. Reports also having orthopnea over the last two days. Denies LE swelling, dizziness, n/v/d, bloody stools, constipation, fevers/chills and other associated sx.    Plan: Admit to tele, likely systolic CHF exacerbation. Lasix 40 mg every AM, follow SMA-7 daily. TTE ordered. Renal consul reviewed in full and   appreciated. CXR is clear but CT chest notes PVC. BNP elevated on arrival at 11,890 consistent with CHF. Trop normal ranges at 46    Acute Congestive Heart Failure, HTN  ECHO  Unknown EF  C/W lasix 40 mg IV QD  Strict ins/outs, weights  Telemetry  Cardio consult  C/W statin, carvedilol 25 mg PO BID    DAVID, Cardiorenal syndrome  Cr: elevated  Unknown baseline  adjust meds as per Crcl  Nephro consult    Epigastric pain 2/2 gastritis, ?worsening peptic ulcer disease  GI consult to consider inpatient EGD  PPI, carafate  CT ab/pelvis appreciated    Simple pelvic cyst  tylenol prn  F/U with gyn outpatient  < from: US Pelvis Complete (US Pelvis Complete .) (03.26.22 @ 17:10) >  6.6 cm simple pelvic cyst  Pt has IUD removed 1 year ago, not currently menstruating     DM II  HgA1c pending, ISS , carb controlled diet     DVT PPX  heparin

## 2022-03-27 NOTE — CONSULT NOTE ADULT - SUBJECTIVE AND OBJECTIVE BOX
Patient is a 46y old  Female who presents with a chief complaint of SOB and chest pain.    HPI:  45yo female PMH DM, HTN, CHF presents complaining of intermittent sharp 10/10 epigastric pain x 2 months. Denies provoking or alleviating factors. Has not seen a doctor for it yet. Reports that over the last three days the chest pain has been constant and associated with sob prompting ED visit. Reports also having orthopnea over the last two days. Denies LE swelling, dizziness, n/v/d, bloody stools, constipation, fevers/chills and other associated sx.    BNP on admission: 98349.  CT A/P with perigastric mesenteric edema may reflect gastritis or peptic ulcer disease. Prominent adjacent lymph node.  Patient reports that she was diagnosed with a gastric ulcer 3-5 years ago and was reportedly treated with antibiotics for it (H.pylori?).  She had similar epigastric pain at that time which resolved.  At home, the patient uses aspirin but denies any NSAID use.  She has been eating small amounts, eating does not alleviate or exacerbate pain.  She denies any melena or hematochezia.  She has lost ~1lb in the past year.  She denies any significant GI family history.  She had seen her outpatient MD who prescribed her omeprazole 3 days ago.      PAST MEDICAL & SURGICAL HISTORY:  Hypertension  Diabetes  Peptic ulcer disease  CHF      Allergies    lisinopril (Other)    Intolerances        MEDICATIONS  (STANDING):  atorvastatin 40 milliGRAM(s) Oral at bedtime  carvedilol 25 milliGRAM(s) Oral every 12 hours  dextrose 5%. 1000 milliLiter(s) (100 mL/Hr) IV Continuous <Continuous>  dextrose 5%. 1000 milliLiter(s) (50 mL/Hr) IV Continuous <Continuous>  dextrose 50% Injectable 25 Gram(s) IV Push once  dextrose 50% Injectable 12.5 Gram(s) IV Push once  dextrose 50% Injectable 25 Gram(s) IV Push once  furosemide   Injectable 40 milliGRAM(s) IV Push daily  glucagon  Injectable 1 milliGRAM(s) IntraMuscular once  heparin   Injectable 5000 Unit(s) SubCutaneous every 12 hours  insulin lispro (ADMELOG) corrective regimen sliding scale   SubCutaneous three times a day before meals  pantoprazole    Tablet 40 milliGRAM(s) Oral before breakfast  sucralfate 1 Gram(s) Oral four times a day    MEDICATIONS  (PRN):  acetaminophen     Tablet .. 650 milliGRAM(s) Oral every 6 hours PRN Mild Pain (1 - 3)  dextrose Oral Gel 15 Gram(s) Oral once PRN Blood Glucose LESS THAN 70 milliGRAM(s)/deciliter      FAMILY HISTORY:  No pertinent GI history in first degree relatives    Social History:  Denies smoking      Review of Systems:  Pertinent ROS as per HPI, otherwise negative  General:  No wt loss, fevers, chills, night sweats, fatigue,   CV:  No pain, palpitations, hypo/hypertension  Resp:  No dyspnea, cough, tachypnea, wheezing  GI:  as per HPI  :  No pain, bleeding, incontinence, nocturia  Muscle:  No pain, weakness  Neuro:  No weakness, tingling, memory problems  Psych:  No fatigue, insomnia, mood problems, depression  Endocrine:  No polyuria, polydipsia, cold/heat intolerance  Heme:  No petechiae, ecchymosis, easy bruisability  Skin:  No rash, tattoos, scars, edema      Vital Signs Last 24 Hrs  T(C): 36.8 (27 Mar 2022 11:02), Max: 36.9 (27 Mar 2022 04:30)  T(F): 98.2 (27 Mar 2022 11:02), Max: 98.4 (27 Mar 2022 04:30)  HR: 95 (27 Mar 2022 11:02) (78 - 142)  BP: 126/98 (27 Mar 2022 11:02) (116/79 - 148/88)  BP(mean): 102 (26 Mar 2022 20:16) (102 - 102)  RR: 18 (27 Mar 2022 11:02) (16 - 20)  SpO2: 99% (27 Mar 2022 11:02) (94% - 100%)      PHYSICAL EXAM:  Constitutional: NAD, well-developed  HEENT: anicteric, EOMI  Neck: No LAD, supple  Cardiovascular: S1 and S2, RRR, no M  Respiratory: CTA and P  Gastrointestinal: BS+, soft, mild epigastric tenderness  Extremities: No peripheral edema, neg clubbing, cyanosis  Vascular: 2+ peripheral pulses  Neurological: A/O x 3, no focal deficits  Psychiatric: Normal mood, normal affect  Skin: No rashes, normal turgor      LABS:                        9.5    9.85  )-----------( 246      ( 27 Mar 2022 09:09 )             29.6     03    141  |  104  |  37<H>  ----------------------------<  198<H>  4.0   |  28  |  2.02<H>    Ca    8.4<L>      27 Mar 2022 09:09  Phos  4.8       Mg     2.6         TPro  6.4  /  Alb  2.5<L>  /  TBili  0.5  /  DBili  x   /  AST  24  /  ALT  31  /  AlkPhos  89      PT/INR - ( 26 Mar 2022 13:02 )   PT: 14.1 sec;   INR: 1.18 ratio         PTT - ( 26 Mar 2022 13:02 )  PTT:29.7 sec  Urinalysis Basic - ( 26 Mar 2022 20:37 )    Color: Yellow / Appearance: Clear / S.015 / pH: x  Gluc: x / Ketone: Negative  / Bili: Negative / Urobili: Negative mg/dL   Blood: x / Protein: 30 mg/dL / Nitrite: Negative   Leuk Esterase: Negative / RBC: x / WBC x   Sq Epi: x / Non Sq Epi: Few / Bacteria: x      LIVER FUNCTIONS - ( 27 Mar 2022 09:09 )  Alb: 2.5 g/dL / Pro: 6.4 gm/dL / ALK PHOS: 89 U/L / ALT: 31 U/L / AST: 24 U/L / GGT: x             RADIOLOGY & ADDITIONAL TESTS:  < from: CT Abdomen and Pelvis No Cont (22 @ 15:35) >  IMPRESSION:  Evaluation of the solid organs, vascular structures and GI tract is   limited without oral and IV contrast.    5.5 x 7.1 cm left adnexal mass with surrounding ascites may represent a   torsed ovary or forced pedunculated uterine myoma. Recommend pelvic   ultrasound.    Mild pulmonaryedema suspected. Cardiomegaly.    Perigastric mesenteric edema may reflect gastritis or peptic ulcer   disease. Prominent adjacent lymph node. Recommend endoscopy.    Findings were discussed with Dr. RENALDO AGRAWAL [id number] 3/26/2022 3:53 PM by   Dr. Negra Cohen with read back confirmation.    --- End of Report ---            NEGRA COHEN MD; Attending Radiologist  This document has been electronically signed. Mar 26 2022  4:04PM    < end of copied text >

## 2022-03-27 NOTE — CONSULT NOTE ADULT - SUBJECTIVE AND OBJECTIVE BOX
CHIEF COMPLAINT:  Patient is a 46y old  Female who presents with a chief complaint of SOB and chest pain. (27 Mar 2022 09:20)      HPI:  45yo female PMH DM, HTN, CHF presents complaining of intermittent sharp 10/10 epigastric pain x 2 months. Denies provoking or alleviating factors. Has not seen a doctor for it yet. Reports that over the last three days the chest pain has been constant and associated with sob prompting ED visit. Reports also having orthopnea over the last two days. Denies LE swelling, dizziness, n/v/d, bloody stools, constipation, fevers/chills and other associated sx.    ALLERGIES:  lisinopril (Other)      Home Medications:    PAST MEDICAL & SURGICAL HISTORY:  Hypertension    Diabetes    Peptic ulcer disease          FAMILY HISTORY:      SOCIAL HISTORY:    REVIEW OF SYSTEMS:  General:  No wt loss, fevers, chills, night sweats  Eyes:  Good vision, no reported pain  ENT:  No sore throat, pain, runny nose, dysphagia  CV:  No pain, palpitations, hypo/hypertension  Resp:  No dyspnea, cough, tachypnea, wheezing  GI:  No pain, nausea, vomiting, diarrhea, constipation  :  No pain, bleeding, incontinence, nocturia  Muscle:  No pain, weakness  Breast:  No pain, abscess, mass, discharge  Neuro:  No weakness, tingling, memory problems  Psych:  No fatigue, insomnia, mood problems, depression  Endocrine:  No polyuria, polydipsia, cold/heat intolerance  Heme:  No petechiae, ecchymosis, easy bruisability  Skin:  No rash, tattoos, scars, edema    PHYSICAL EXAM:  Vital Signs:  Vital Signs Last 24 Hrs  T(C): 36.8 (27 Mar 2022 11:02), Max: 36.9 (27 Mar 2022 04:30)  T(F): 98.2 (27 Mar 2022 11:02), Max: 98.4 (27 Mar 2022 04:30)  HR: 95 (27 Mar 2022 11:02) (78 - 142)  BP: 126/98 (27 Mar 2022 11:02) (116/79 - 148/88)  BP(mean): 102 (26 Mar 2022 20:16) (102 - 102)  RR: 18 (27 Mar 2022 11:02) (16 - 20)  SpO2: 99% (27 Mar 2022 11:02) (94% - 100%)  I&O's Summary    I&O's Detail      Tele:     Constitutional: well developed, normal appearance, well groomed, well nourished, no deformities and no acute distress.   Eyes: the conjunctiva exhibited no abnormalities and the eyelids demonstrated no xanthelasmas.   HEENT: normal oral mucosa, no oral pallor and no oral cyanosis.   Neck: normal jugular venous A waves present, normal jugular venous V waves present and no jugular venous marcos A waves.   Pulmonary: no respiratory distress, normal respiratory rhythm and effort, no accessory muscle use and lungs were clear to auscultation bilaterally.   Cardiovascular: heart rate and rhythm were normal, normal S1 and S2 and no murmur, gallop, rub, heave or thrill are present.   Abdomen: soft, non-tender, no hepato-splenomegaly and no abdominal mass palpated.   Musculoskeletal: the gait could not be assessed..   Extremities: no clubbing of the fingernails, no localized cyanosis, no petechial hemorrhages and no ischemic changes.   Skin: normal skin color and pigmentation, no rash, no venous stasis, no skin lesions, no skin ulcer and no xanthoma was observed.   Psychiatric: oriented to person, place, and time, the affect was normal, the mood was normal and not feeling anxious.      LABORATORY:                          9.5    9.85  )-----------( 246      ( 27 Mar 2022 09:09 )             29.6         141  |  104  |  37<H>  ----------------------------<  198<H>  4.0   |  28  |  2.02<H>    Ca    8.4<L>      27 Mar 2022 09:09  Phos  4.8       Mg     2.6         TPro  6.4  /  Alb  2.5<L>  /  TBili  0.5  /  DBili  x   /  AST  24  /  ALT  31  /  AlkPhos  89            CAPILLARY BLOOD GLUCOSE      POCT Blood Glucose.: 208 mg/dL (27 Mar 2022 11:04)  POCT Blood Glucose.: 218 mg/dL (27 Mar 2022 07:37)  POCT Blood Glucose.: 119 mg/dL (27 Mar 2022 00:33)    LIVER FUNCTIONS - ( 27 Mar 2022 09:09 )  Alb: 2.5 g/dL / Pro: 6.4 gm/dL / ALK PHOS: 89 U/L / ALT: 31 U/L / AST: 24 U/L / GGT: x           PT/INR - ( 26 Mar 2022 13:02 )   PT: 14.1 sec;   INR: 1.18 ratio         PTT - ( 26 Mar 2022 13:02 )  PTT:29.7 sec  Urinalysis Basic - ( 26 Mar 2022 20:37 )    Color: Yellow / Appearance: Clear / S.015 / pH: x  Gluc: x / Ketone: Negative  / Bili: Negative / Urobili: Negative mg/dL   Blood: x / Protein: 30 mg/dL / Nitrite: Negative   Leuk Esterase: Negative / RBC: x / WBC x   Sq Epi: x / Non Sq Epi: Few / Bacteria: x      BNPSerum Pro-Brain Natriuretic Peptide: 27424 pg/mL (22 @ 13:02)      IMAGING:  < from: CT Chest No Cont (22 @ 15:36) >  IMPRESSION:  Evaluation of the solid organs, vascular structures and GI tract is   limited without oral and IV contrast.    5.5 x 7.1 cm left adnexal mass with surrounding ascites may represent a   torsed ovary or forced pedunculated uterine myoma. Recommend pelvic   ultrasound.    Mild pulmonaryedema suspected. Cardiomegaly.    Perigastric mesenteric edema may reflect gastritis or peptic ulcer   disease. Prominent adjacent lymph node. Recommend endoscopy.    < end of copied text >    ASSESSMENT:  45yo female PMH DM, HTN, CHF presents complaining of intermittent sharp 10/10 epigastric pain x 2 months. Denies provoking or alleviating factors. Has not seen a doctor for it yet. Reports that over the last three days the chest pain has been constant and associated with sob prompting ED visit. Reports also having orthopnea over the last two days. Denies LE swelling, dizziness, n/v/d, bloody stools, constipation, fevers/chills and other associated sx.    PLAN:       atorvastatin 40 milliGRAM(s) Oral at bedtime  carvedilol 25 milliGRAM(s) Oral every 12 hours  furosemide   Injectable 40 milliGRAM(s) IV Push daily  glucagon  Injectable 1 milliGRAM(s) IntraMuscular once  heparin   Injectable 5000 Unit(s) SubCutaneous every 12 hours  insulin lispro (ADMELOG) corrective regimen sliding scale   SubCutaneous three times a day before meals  pantoprazole    Tablet 40 milliGRAM(s) Oral before breakfast      Ammon Fernández MD, FACC, RADHA, ALEXSANDER, FACP  Director, Heart Failure Services  [facility]  , Department of Cardiology  [facility]     CHIEF COMPLAINT:  Patient is a 46y old  Female who presents with a chief complaint of SOB and chest pain. (27 Mar 2022 09:20)      HPI:  47yo female PMH DM, HTN, CHF presents complaining of intermittent sharp 10/10 epigastric pain x 2 months. Denies provoking or alleviating factors. Has not seen a doctor for it yet. Reports that over the last three days the chest pain has been constant and associated with sob prompting ED visit. Reports also having orthopnea over the last two days. Denies LE swelling, dizziness, n/v/d, bloody stools, constipation, fevers/chills and other associated sx.    ALLERGIES:  lisinopril (Other)      Home Medications:  Metformin    PAST MEDICAL & SURGICAL HISTORY:  Hypertension    Diabetes    Peptic ulcer disease          FAMILY HISTORY:  n/a    SOCIAL HISTORY:    REVIEW OF SYSTEMS:  General:  No wt loss, fevers, chills, night sweats  Eyes:  Good vision, no reported pain  ENT:  No sore throat, pain, runny nose, dysphagia  CV:  No pain, palpitations, hypo/hypertension  Resp:  No dyspnea, cough, tachypnea, wheezing  GI:  No pain, nausea, vomiting, diarrhea, constipation  :  No pain, bleeding, incontinence, nocturia  Muscle:  No pain, weakness  Breast:  No pain, abscess, mass, discharge  Neuro:  No weakness, tingling, memory problems  Psych:  No fatigue, insomnia, mood problems, depression  Endocrine:  No polyuria, polydipsia, cold/heat intolerance  Heme:  No petechiae, ecchymosis, easy bruisability  Skin:  No rash, edema    PHYSICAL EXAM:  Vital Signs:  Vital Signs Last 24 Hrs  T(C): 36.8 (27 Mar 2022 11:02), Max: 36.9 (27 Mar 2022 04:30)  T(F): 98.2 (27 Mar 2022 11:02), Max: 98.4 (27 Mar 2022 04:30)  HR: 95 (27 Mar 2022 11:02) (78 - 142)  BP: 126/98 (27 Mar 2022 11:02) (116/79 - 148/88)  BP(mean): 102 (26 Mar 2022 20:16) (102 - 102)  RR: 18 (27 Mar 2022 11:02) (16 - 20)  SpO2: 99% (27 Mar 2022 11:02) (94% - 100%)      Tele: SR, PVC    Constitutional: well developed, normal appearance, well groomed, well nourished, no deformities and no acute distress.   Eyes: the conjunctiva exhibited no abnormalities and the eyelids demonstrated no xanthelasmas.   HEENT: normal oral mucosa, no oral pallor and no oral cyanosis.   Neck: normal jugular venous A waves present, normal jugular venous V waves present and no jugular venous marcos A waves.   Pulmonary: no respiratory distress, normal respiratory rhythm and effort, no accessory muscle use and lungs were clear to auscultation bilaterally.   Cardiovascular: heart rate and rhythm were normal, normal S1 and S2 and no murmur, gallop, rub, heave or thrill are present.   Abdomen: soft, non-tender, no hepato-splenomegaly and no abdominal mass palpated.   Musculoskeletal: the gait could not be assessed..   Extremities: no clubbing of the fingernails, no localized cyanosis, no petechial hemorrhages and no ischemic changes.   Skin: normal skin color and pigmentation, no rash, no venous stasis, no skin lesions, no skin ulcer and no xanthoma was observed.   Psychiatric: oriented to person, place, and time, the affect was normal, the mood was normal and not feeling anxious.      LABORATORY:                          9.5    9.85  )-----------( 246      ( 27 Mar 2022 09:09 )             29.6     03    141  |  104  |  37<H>  ----------------------------<  198<H>  4.0   |  28  |  2.02<H>    Ca    8.4<L>      27 Mar 2022 09:09  Phos  4.8       Mg     2.6         TPro  6.4  /  Alb  2.5<L>  /  TBili  0.5  /  DBili  x   /  AST  24  /  ALT  31  /  AlkPhos  89            CAPILLARY BLOOD GLUCOSE  POCT Blood Glucose.: 208 mg/dL (27 Mar 2022 11:04)  POCT Blood Glucose.: 218 mg/dL (27 Mar 2022 07:37)  POCT Blood Glucose.: 119 mg/dL (27 Mar 2022 00:33)    LIVER FUNCTIONS - ( 27 Mar 2022 09:09 )  Alb: 2.5 g/dL / Pro: 6.4 gm/dL / ALK PHOS: 89 U/L / ALT: 31 U/L / AST: 24 U/L / GGT: x           PT/INR - ( 26 Mar 2022 13:02 )   PT: 14.1 sec;   INR: 1.18 ratio         PTT - ( 26 Mar 2022 13:02 )  PTT:29.7 sec  Urinalysis Basic - ( 26 Mar 2022 20:37 )    Color: Yellow / Appearance: Clear / S.015 / pH: x  Gluc: x / Ketone: Negative  / Bili: Negative / Urobili: Negative mg/dL   Blood: x / Protein: 30 mg/dL / Nitrite: Negative   Leuk Esterase: Negative / RBC: x / WBC x   Sq Epi: x / Non Sq Epi: Few / Bacteria: x      BNPSerum Pro-Brain Natriuretic Peptide: 56751 pg/mL (22 @ 13:02)      IMAGING:  < from: CT Chest No Cont (22 @ 15:36) >  IMPRESSION:  Evaluation of the solid organs, vascular structures and GI tract is   limited without oral and IV contrast.    5.5 x 7.1 cm left adnexal mass with surrounding ascites may represent a   torsed ovary or forced pedunculated uterine myoma. Recommend pelvic   ultrasound.    Mild pulmonaryedema suspected. Cardiomegaly.    Perigastric mesenteric edema may reflect gastritis or peptic ulcer   disease. Prominent adjacent lymph node. Recommend endoscopy.    < end of copied text >      ASSESSMENT:  47yo female PMH DM, HTN, CHF presents complaining of intermittent sharp 10/10 epigastric pain x 2 months. Denies provoking or alleviating factors. Has not seen a doctor for it yet. Reports that over the last three days the chest pain has been constant and associated with sob prompting ED visit. Reports also having orthopnea over the last two days. Denies LE swelling, dizziness, n/v/d, bloody stools, constipation, fevers/chills and other associated sx.    PLAN:       atorvastatin 40 milliGRAM(s) Oral at bedtime  carvedilol 25 milliGRAM(s) Oral every 12 hours  furosemide   Injectable 40 milliGRAM(s) IV Push daily  heparin   Injectable 5000 Unit(s) SubCutaneous every 12 hours  insulin lispro (ADMELOG) corrective regimen sliding scale   SubCutaneous three times a day before meals  pantoprazole    Tablet 40 milliGRAM(s) Oral before breakfast    check echo.  keep fluid negative.    Ammon Fernández MD, FACC, FASAUSTIN, ALEXSANDER, FACP  Director, Heart Failure Services  [facility]  , Department of Cardiology  [facility]     CHIEF COMPLAINT:  Patient is a 46y old  Female who presents with a chief complaint of SOB and chest pain. (27 Mar 2022 09:20)      HPI:  She is a pleasant 46-year-old female with hypertension, diabetes, heart failure admitted with epigastric pain for about 2 months and over the last several days has been experiencing chest pain and shortness of breath.  She is seen earlier today resting comfortably in bed.    ALLERGIES:  lisinopril (Other)      Home Medications:  Metformin    PAST MEDICAL & SURGICAL HISTORY:  Hypertension    Diabetes    Peptic ulcer disease          FAMILY HISTORY:  n/a    SOCIAL HISTORY:  nonsmoker    REVIEW OF SYSTEMS:  General:  No wt loss, fevers, chills, night sweats  Eyes:  Good vision, no reported pain  ENT:  No sore throat, pain, runny nose, dysphagia  CV:  No pain, palpitations, hypo/hypertension  Resp:  No dyspnea, cough, tachypnea, wheezing  GI:  No pain, nausea, vomiting, diarrhea, constipation  :  No pain, bleeding, incontinence, nocturia  Muscle:  No pain, weakness  Breast:  No pain, abscess, mass, discharge  Neuro:  No weakness, tingling, memory problems  Psych:  No fatigue, insomnia, mood problems, depression  Endocrine:  No polyuria, polydipsia, cold/heat intolerance  Heme:  No petechiae, ecchymosis, easy bruisability  Skin:  No rash, edema    PHYSICAL EXAM:  Vital Signs:  Vital Signs Last 24 Hrs  T(C): 36.8 (27 Mar 2022 11:02), Max: 36.9 (27 Mar 2022 04:30)  T(F): 98.2 (27 Mar 2022 11:02), Max: 98.4 (27 Mar 2022 04:30)  HR: 95 (27 Mar 2022 11:02) (78 - 142)  BP: 126/98 (27 Mar 2022 11:02) (116/79 - 148/88)  BP(mean): 102 (26 Mar 2022 20:16) (102 - 102)  RR: 18 (27 Mar 2022 11:02) (16 - 20)  SpO2: 99% (27 Mar 2022 11:02) (94% - 100%)      Tele: SR, PVC    Constitutional: well developed, normal appearance, well groomed, well nourished, no deformities and no acute distress.   Eyes: the conjunctiva exhibited no abnormalities and the eyelids demonstrated no xanthelasmas.   HEENT: normal oral mucosa, no oral pallor and no oral cyanosis.   Neck: normal jugular venous A waves present, normal jugular venous V waves present and no jugular venous marcos A waves.   Pulmonary: no respiratory distress, normal respiratory rhythm and effort, no accessory muscle use and lungs were clear to auscultation bilaterally.   Cardiovascular: heart rate and rhythm were normal, normal S1 and S2 and no murmur, gallop, rub, heave or thrill are present.   Abdomen: soft, non-tender, no hepato-splenomegaly and no abdominal mass palpated.   Musculoskeletal: the gait could not be assessed..   Extremities: no clubbing of the fingernails, no localized cyanosis, no petechial hemorrhages and no ischemic changes.   Skin: normal skin color and pigmentation, no rash, no venous stasis, no skin lesions, no skin ulcer and no xanthoma was observed.   Psychiatric: oriented to person, place, and time, the affect was normal, the mood was normal and not feeling anxious.      LABORATORY:                          9.5    9.85  )-----------( 246      ( 27 Mar 2022 09:09 )             29.6     03-27    141  |  104  |  37<H>  ----------------------------<  198<H>  4.0   |  28  |  2.02<H>    Ca    8.4<L>      27 Mar 2022 09:09  Phos  4.8     03-  Mg     2.6     -    TPro  6.4  /  Alb  2.5<L>  /  TBili  0.5  /  DBili  x   /  AST  24  /  ALT  31  /  AlkPhos  89  03-27          CAPILLARY BLOOD GLUCOSE  POCT Blood Glucose.: 208 mg/dL (27 Mar 2022 11:04)  POCT Blood Glucose.: 218 mg/dL (27 Mar 2022 07:37)  POCT Blood Glucose.: 119 mg/dL (27 Mar 2022 00:33)    LIVER FUNCTIONS - ( 27 Mar 2022 09:09 )  Alb: 2.5 g/dL / Pro: 6.4 gm/dL / ALK PHOS: 89 U/L / ALT: 31 U/L / AST: 24 U/L / GGT: x           PT/INR - ( 26 Mar 2022 13:02 )   PT: 14.1 sec;   INR: 1.18 ratio         PTT - ( 26 Mar 2022 13:02 )  PTT:29.7 sec  Urinalysis Basic - ( 26 Mar 2022 20:37 )    Color: Yellow / Appearance: Clear / S.015 / pH: x  Gluc: x / Ketone: Negative  / Bili: Negative / Urobili: Negative mg/dL   Blood: x / Protein: 30 mg/dL / Nitrite: Negative   Leuk Esterase: Negative / RBC: x / WBC x   Sq Epi: x / Non Sq Epi: Few / Bacteria: x      BNPSerum Pro-Brain Natriuretic Peptide: 39816 pg/mL (22 @ 13:02)      IMAGING:  < from: CT Chest No Cont (22 @ 15:36) >  IMPRESSION:  Evaluation of the solid organs, vascular structures and GI tract is   limited without oral and IV contrast.    5.5 x 7.1 cm left adnexal mass with surrounding ascites may represent a   torsed ovary or forced pedunculated uterine myoma. Recommend pelvic   ultrasound.    Mild pulmonaryedema suspected. Cardiomegaly.    Perigastric mesenteric edema may reflect gastritis or peptic ulcer   disease. Prominent adjacent lymph node. Recommend endoscopy.    < end of copied text >      ASSESSMENT:  She is a pleasant 46-year-old female with hypertension, diabetes, heart failure admitted with epigastric pain for about 2 months and over the last several days has been experiencing chest pain and shortness of breath.  She is seen earlier today resting comfortably in bed.  BNP elevated and mild pulmonary edema seen on chest CT.    PLAN:     Continue atorvastatin for lipid lowering.  Insulin for diabetic management.  PPI for an element of gastritis  And peptic ulcer disease.  Continue carvedilol for blood pressure management and Lasix 40 mg IV push daily for diuresis and heart failure treatment.  DVT prevention with heparin to continue.  Avoid aspirin and NSAIDs. Keep fluid status negative.   F/u labs. Continue telemetry monitoring. Echo ordered to assess LV function and valve status.    Ammon Fernández MD, FACC, RADHA, ALEXSANDER, FACP  Director, Heart Failure Services  [facility]  , Department of Cardiology  [facility]

## 2022-03-27 NOTE — PROGRESS NOTE ADULT - SUBJECTIVE AND OBJECTIVE BOX
[facility] NEPHROLOGY SERVICES, [facility]  NEPHROLOGY AND HYPERTENSION  [address]  [address]  [place] [address]  [phone]    MD RENEE VITAL MD ANDREY GONCHARUK, MD MADHU KORRAPATI, MD YELENA ROSENBERG, MD BINNY KOSHY, MD CHRISTOPHER CAPUTO, MD CHANI PERALES MD          Patient events noted  no distress    MEDICATIONS  (STANDING):  atorvastatin 40 milliGRAM(s) Oral at bedtime  carvedilol 25 milliGRAM(s) Oral every 12 hours  dextrose 5%. 1000 milliLiter(s) (100 mL/Hr) IV Continuous <Continuous>  dextrose 5%. 1000 milliLiter(s) (50 mL/Hr) IV Continuous <Continuous>  dextrose 50% Injectable 25 Gram(s) IV Push once  dextrose 50% Injectable 12.5 Gram(s) IV Push once  dextrose 50% Injectable 25 Gram(s) IV Push once  furosemide   Injectable 40 milliGRAM(s) IV Push daily  glucagon  Injectable 1 milliGRAM(s) IntraMuscular once  heparin   Injectable 5000 Unit(s) SubCutaneous every 12 hours  insulin lispro (ADMELOG) corrective regimen sliding scale   SubCutaneous three times a day before meals  pantoprazole    Tablet 40 milliGRAM(s) Oral before breakfast  sucralfate 1 Gram(s) Oral four times a day    MEDICATIONS  (PRN):  acetaminophen     Tablet .. 650 milliGRAM(s) Oral every 6 hours PRN Mild Pain (1 - 3)  dextrose Oral Gel 15 Gram(s) Oral once PRN Blood Glucose LESS THAN 70 milliGRAM(s)/deciliter      03-27-22 @ 07:01  -  03-27-22 @ 21:09  --------------------------------------------------------  IN: 580 mL / OUT: 500 mL / NET: 80 mL      PHYSICAL EXAM:      T(C): 37.4 (03-27-22 @ 16:07), Max: 37.4 (03-27-22 @ 16:07)  HR: 91 (03-27-22 @ 16:07) (86 - 142)  BP: 136/91 (03-27-22 @ 16:07) (126/98 - 148/88)  RR: 18 (03-27-22 @ 16:07) (16 - 18)  SpO2: 100% (03-27-22 @ 16:07) (94% - 100%)  Wt(kg): --  Lungs clear  Heart S1S2  Abd soft NT ND  Extremities:   tr edema                                    9.5    9.85  )-----------( 246      ( 27 Mar 2022 09:09 )             29.6     03-27    141  |  104  |  37<H>  ----------------------------<  198<H>  4.0   |  28  |  2.02<H>    Ca    8.4<L>      27 Mar 2022 09:09  Phos  4.8     03-27  Mg     2.6     03-27    TPro  6.4  /  Alb  2.5<L>  /  TBili  0.5  /  DBili  x   /  AST  24  /  ALT  31  /  AlkPhos  89  03-27      LIVER FUNCTIONS - ( 27 Mar 2022 09:09 )  Alb: 2.5 g/dL / Pro: 6.4 gm/dL / ALK PHOS: 89 U/L / ALT: 31 U/L / AST: 24 U/L / GGT: x           Creatinine Trend: 2.02<--, 1.84<--      Assessment   Suspected cardiorenal syndrome;   Hx of CM EF 30% on lasix maintenance.   Hume UA micro     Plan  Continued warranted diuresis;   Will follow course        Jamaal Elkins MD

## 2022-03-27 NOTE — PROGRESS NOTE ADULT - SUBJECTIVE AND OBJECTIVE BOX
Patient is a 46y old  Female who presents with a chief complaint of SOB and chest pain. (26 Mar 2022 15:23)    INTERVAL HPI/OVERNIGHT EVENTS: Patients seen and examined at bedside this morning. No acute events overnight. Pt reports    MEDICATIONS  (STANDING):  atorvastatin 40 milliGRAM(s) Oral at bedtime  carvedilol 25 milliGRAM(s) Oral every 12 hours  furosemide   Injectable 40 milliGRAM(s) IV Push daily  heparin   Injectable 5000 Unit(s) SubCutaneous every 12 hours  pantoprazole    Tablet 40 milliGRAM(s) Oral before breakfast    MEDICATIONS  (PRN):  acetaminophen     Tablet .. 650 milliGRAM(s) Oral every 6 hours PRN Mild Pain (1 - 3)    Allergies    lisinopril (Other)    Intolerances      REVIEW OF SYSTEMS:  All other systems reviewed and are negative    Vital Signs Last 24 Hrs  T(C): 36.9 (27 Mar 2022 04:30), Max: 36.9 (27 Mar 2022 04:30)  T(F): 98.4 (27 Mar 2022 04:30), Max: 98.4 (27 Mar 2022 04:30)  HR: 89 (27 Mar 2022 04:30) (78 - 142)  BP: 133/86 (27 Mar 2022 04:30) (116/79 - 148/88)  BP(mean): 102 (26 Mar 2022 20:16) (102 - 102)  RR: 17 (27 Mar 2022 04:30) (16 - 20)  SpO2: 96% (27 Mar 2022 04:30) (94% - 100%)  Daily Height in cm: 167.64 (26 Mar 2022 11:32)    Daily Weight in k.1 (27 Mar 2022 04:30)  I&O's Summary    CAPILLARY BLOOD GLUCOSE      POCT Blood Glucose.: 218 mg/dL (27 Mar 2022 07:37)  POCT Blood Glucose.: 119 mg/dL (27 Mar 2022 00:33)    PHYSICAL EXAM:  GENERAL: NAD, well-groomed, well-developed  HEAD:  Atraumatic, Normocephalic  EYES: EOMI, PERRLA, conjunctiva and sclera clear  ENMT: No tonsillar erythema, exudates, or enlargement; Moist mucous membranes, Good dentition, No lesions  NECK: Supple, No JVD, Normal thyroid  NERVOUS SYSTEM:  Alert & Oriented X3, Good concentration; Motor Strength 5/5 B/L upper and lower extremities; DTRs 2+ intact and symmetric  CHEST/LUNG: Clear to percussion bilaterally; No rales, rhonchi, wheezing, or rubs  HEART: Regular rate and rhythm; No murmurs, rubs, or gallops  ABDOMEN: Soft, Nontender, Nondistended; Bowel sounds present  EXTREMITIES:  2+ Peripheral Pulses, No clubbing, cyanosis, or edema  LYMPH: No lymphadenopathy noted  SKIN: No rashes or lesions    Labs                          9.5    9.85  )-----------( 246      ( 27 Mar 2022 09:09 )             29.6         140  |  106  |  36<H>  ----------------------------<  164<H>  4.2   |  29  |  1.84<H>    Ca    8.7      26 Mar 2022 13:02    TPro  7.0  /  Alb  2.7<L>  /  TBili  0.8  /  DBili  x   /  AST  22  /  ALT  30  /  AlkPhos  92  -    PT/INR - ( 26 Mar 2022 13:02 )   PT: 14.1 sec;   INR: 1.18 ratio         PTT - ( 26 Mar 2022 13:02 )  PTT:29.7 sec      Urinalysis Basic - ( 26 Mar 2022 20:37 )    Color: Yellow / Appearance: Clear / S.015 / pH: x  Gluc: x / Ketone: Negative  / Bili: Negative / Urobili: Negative mg/dL   Blood: x / Protein: 30 mg/dL / Nitrite: Negative   Leuk Esterase: Negative / RBC: x / WBC x   Sq Epi: x / Non Sq Epi: Few / Bacteria: x                   Patient is a 46y old  Female who presents with a chief complaint of SOB and chest pain. (26 Mar 2022 15:23)    INTERVAL HPI/OVERNIGHT EVENTS: Patients seen and examined at bedside this morning. As per nurse, she is not wearing the oxygen. Currently on telemetry with HR >140s. Received carvedilol and has improved. Pt reports she doesn't recall anything that happened after coming here and being told she needs a CT scan. States she might have fell asleep because she was tired. States she had abdominal pain 3 years ago and had EGD done which showed gastritis and an ulcer. Was treated back than and then resolved. Has not been on any medications since then and stomach pain has not returned till recently. Last PMD visit was  Dr. Willis for routine visit. Cardiology ppt coming up next month. She states her last ECHO was "normal". Dr. Urena is cardiologist. Reports not urinating enough lately. Pt is irritable States she not SOB or having chest pain. Endorsing she just doesn't know whats going on. States she has not been confused or forgetful. Asking what day of the week is it. Emergency contact is daughter ([phone]). [facility] pharmacy: medication verification pending [phone] [facility].     MEDICATIONS  (STANDING):  atorvastatin 40 milliGRAM(s) Oral at bedtime  carvedilol 25 milliGRAM(s) Oral every 12 hours  furosemide   Injectable 40 milliGRAM(s) IV Push daily  heparin   Injectable 5000 Unit(s) SubCutaneous every 12 hours  pantoprazole    Tablet 40 milliGRAM(s) Oral before breakfast    MEDICATIONS  (PRN):  acetaminophen     Tablet .. 650 milliGRAM(s) Oral every 6 hours PRN Mild Pain (1 - 3)    Allergies    lisinopril (Other)    Intolerances      REVIEW OF SYSTEMS:  All other systems reviewed and are negative    Vital Signs Last 24 Hrs  T(C): 36.9 (27 Mar 2022 04:30), Max: 36.9 (27 Mar 2022 04:30)  T(F): 98.4 (27 Mar 2022 04:30), Max: 98.4 (27 Mar 2022 04:30)  HR: 89 (27 Mar 2022 04:30) (78 - 142)  BP: 133/86 (27 Mar 2022 04:30) (116/79 - 148/88)  BP(mean): 102 (26 Mar 2022 20:16) (102 - 102)  RR: 17 (27 Mar 2022 04:30) (16 - 20)  SpO2: 96% (27 Mar 2022 04:30) (94% - 100%)  Daily Height in cm: 167.64 (26 Mar 2022 11:32)    Daily Weight in k.1 (27 Mar 2022 04:30)  I&O's Summary    CAPILLARY BLOOD GLUCOSE      POCT Blood Glucose.: 218 mg/dL (27 Mar 2022 07:37)  POCT Blood Glucose.: 119 mg/dL (27 Mar 2022 00:33)    PHYSICAL EXAM:  GENERAL: NAD, age appropriate, irritable  HEAD:  Atraumatic, Normocephalic  EYES: EOMI, PERRLA, conjunctiva and sclera clear  ENMT: No tonsillar erythema, exudates, or enlargement; Moist mucous membranes, Good dentition, No lesions  NECK: Supple, No JVD, Normal thyroid  NERVOUS SYSTEM:  Alert & Oriented X2, Poor concentration; Motor Strength 5/5 B/L upper and lower extremities; DTRs 2+ intact and symmetric  CHEST/LUNG: Clear to percussion bilaterally; Diminished throughout. Rales b/l bases to midlobes.   HEART: Regular rate and rhythm; No murmurs, rubs, or gallops  ABDOMEN: Soft, Nontender, Nondistended; Bowel sounds present  EXTREMITIES:  2+ Peripheral Pulses, 2+ edema b/l foot to ankles  LYMPH: No lymphadenopathy noted  SKIN: No rashes or lesions    Labs                          9.5    9.85  )-----------( 246      ( 27 Mar 2022 09:09 )             29.6     03-    140  |  106  |  36<H>  ----------------------------<  164<H>  4.2   |  29  |  1.84<H>    Ca    8.7      26 Mar 2022 13:02    TPro  7.0  /  Alb  2.7<L>  /  TBili  0.8  /  DBili  x   /  AST  22  /  ALT  30  /  AlkPhos  92  03-26    PT/INR - ( 26 Mar 2022 13:02 )   PT: 14.1 sec;   INR: 1.18 ratio         PTT - ( 26 Mar 2022 13:02 )  PTT:29.7 sec      Urinalysis Basic - ( 26 Mar 2022 20:37 )    Color: Yellow / Appearance: Clear / S.015 / pH: x  Gluc: x / Ketone: Negative  / Bili: Negative / Urobili: Negative mg/dL   Blood: x / Protein: 30 mg/dL / Nitrite: Negative   Leuk Esterase: Negative / RBC: x / WBC x   Sq Epi: x / Non Sq Epi: Few / Bacteria: x

## 2022-03-27 NOTE — CONSULT NOTE ADULT - ASSESSMENT
45yo female PMH DM, HTN, CHF presents complaining of intermittent sharp 10/10 epigastric pain x 2 months.  Admitted with CHF exacerbation.       1.  Epigastric pain,  history of ulcer on prior EGD 3-5 years ago.  CT A/P with perigastric mesenteric edema and prominent adjacent lymph node, may reflect gastritis or peptic ulcer disease.  Volume overload and low-flow state may be contributory.  2.  CHF.  3.  DM.  4.  HTN.  5.  Anemia.  6.  DAVID, possible cardiorenal syndrome.    Recs:  - Labs and imaging reviewed.  - Monitor Hb daily.  - PPI daily, sucralfate 2-4 times a day.  - Diet as tolerated.  - Diuresis per cardiology.  - Will consider EGD once optimized from cardiac perspective.

## 2022-03-28 LAB
ALBUMIN SERPL ELPH-MCNC: 2.7 G/DL — LOW (ref 3.3–5)
ALP SERPL-CCNC: 92 U/L — SIGNIFICANT CHANGE UP (ref 40–120)
ALT FLD-CCNC: 31 U/L — SIGNIFICANT CHANGE UP (ref 12–78)
ANION GAP SERPL CALC-SCNC: 6 MMOL/L — SIGNIFICANT CHANGE UP (ref 5–17)
ANION GAP SERPL CALC-SCNC: 7 MMOL/L — SIGNIFICANT CHANGE UP (ref 5–17)
AST SERPL-CCNC: 18 U/L — SIGNIFICANT CHANGE UP (ref 15–37)
BILIRUB SERPL-MCNC: 0.5 MG/DL — SIGNIFICANT CHANGE UP (ref 0.2–1.2)
BUN SERPL-MCNC: 37 MG/DL — HIGH (ref 7–23)
BUN SERPL-MCNC: 43 MG/DL — HIGH (ref 7–23)
CALCIUM SERPL-MCNC: 8.7 MG/DL — SIGNIFICANT CHANGE UP (ref 8.5–10.1)
CALCIUM SERPL-MCNC: 8.8 MG/DL — SIGNIFICANT CHANGE UP (ref 8.5–10.1)
CHLORIDE SERPL-SCNC: 104 MMOL/L — SIGNIFICANT CHANGE UP (ref 96–108)
CHLORIDE SERPL-SCNC: 105 MMOL/L — SIGNIFICANT CHANGE UP (ref 96–108)
CO2 SERPL-SCNC: 30 MMOL/L — SIGNIFICANT CHANGE UP (ref 22–31)
CO2 SERPL-SCNC: 30 MMOL/L — SIGNIFICANT CHANGE UP (ref 22–31)
CREAT SERPL-MCNC: 1.98 MG/DL — HIGH (ref 0.5–1.3)
CREAT SERPL-MCNC: 2.14 MG/DL — HIGH (ref 0.5–1.3)
EGFR: 28 ML/MIN/1.73M2 — LOW
EGFR: 31 ML/MIN/1.73M2 — LOW
FLUAV AG NPH QL: SIGNIFICANT CHANGE UP
FLUBV AG NPH QL: SIGNIFICANT CHANGE UP
GLUCOSE BLDC GLUCOMTR-MCNC: 124 MG/DL — HIGH (ref 70–99)
GLUCOSE BLDC GLUCOMTR-MCNC: 145 MG/DL — HIGH (ref 70–99)
GLUCOSE BLDC GLUCOMTR-MCNC: 149 MG/DL — HIGH (ref 70–99)
GLUCOSE BLDC GLUCOMTR-MCNC: 267 MG/DL — HIGH (ref 70–99)
GLUCOSE SERPL-MCNC: 194 MG/DL — HIGH (ref 70–99)
GLUCOSE SERPL-MCNC: 223 MG/DL — HIGH (ref 70–99)
MAGNESIUM SERPL-MCNC: 2.7 MG/DL — HIGH (ref 1.6–2.6)
MAGNESIUM SERPL-MCNC: 2.8 MG/DL — HIGH (ref 1.6–2.6)
PHOSPHATE SERPL-MCNC: 4.2 MG/DL — SIGNIFICANT CHANGE UP (ref 2.5–4.5)
PHOSPHATE SERPL-MCNC: 4.6 MG/DL — HIGH (ref 2.5–4.5)
POTASSIUM SERPL-MCNC: 3.7 MMOL/L — SIGNIFICANT CHANGE UP (ref 3.5–5.3)
POTASSIUM SERPL-MCNC: 3.9 MMOL/L — SIGNIFICANT CHANGE UP (ref 3.5–5.3)
POTASSIUM SERPL-SCNC: 3.7 MMOL/L — SIGNIFICANT CHANGE UP (ref 3.5–5.3)
POTASSIUM SERPL-SCNC: 3.9 MMOL/L — SIGNIFICANT CHANGE UP (ref 3.5–5.3)
PROT SERPL-MCNC: 6.9 GM/DL — SIGNIFICANT CHANGE UP (ref 6–8.3)
SARS-COV-2 RNA SPEC QL NAA+PROBE: SIGNIFICANT CHANGE UP
SODIUM SERPL-SCNC: 141 MMOL/L — SIGNIFICANT CHANGE UP (ref 135–145)
SODIUM SERPL-SCNC: 141 MMOL/L — SIGNIFICANT CHANGE UP (ref 135–145)

## 2022-03-28 PROCEDURE — 99233 SBSQ HOSP IP/OBS HIGH 50: CPT

## 2022-03-28 PROCEDURE — 93306 TTE W/DOPPLER COMPLETE: CPT | Mod: 26

## 2022-03-28 RX ORDER — CARVEDILOL PHOSPHATE 80 MG/1
1 CAPSULE, EXTENDED RELEASE ORAL
Qty: 0 | Refills: 0 | DISCHARGE

## 2022-03-28 RX ORDER — FUROSEMIDE 40 MG
1 TABLET ORAL
Qty: 0 | Refills: 0 | DISCHARGE

## 2022-03-28 RX ORDER — LOSARTAN POTASSIUM 100 MG/1
1 TABLET, FILM COATED ORAL
Qty: 0 | Refills: 0 | DISCHARGE

## 2022-03-28 RX ORDER — METFORMIN HYDROCHLORIDE 850 MG/1
1 TABLET ORAL
Qty: 0 | Refills: 0 | DISCHARGE

## 2022-03-28 RX ORDER — ISOSORBIDE MONONITRATE 60 MG/1
1 TABLET, EXTENDED RELEASE ORAL
Qty: 0 | Refills: 0 | DISCHARGE

## 2022-03-28 RX ORDER — INSULIN GLARGINE 100 [IU]/ML
37 INJECTION, SOLUTION SUBCUTANEOUS
Qty: 0 | Refills: 0 | DISCHARGE

## 2022-03-28 RX ADMIN — Medication 40 MILLIGRAM(S): at 05:20

## 2022-03-28 RX ADMIN — PANTOPRAZOLE SODIUM 40 MILLIGRAM(S): 20 TABLET, DELAYED RELEASE ORAL at 05:21

## 2022-03-28 RX ADMIN — Medication 1 GRAM(S): at 05:21

## 2022-03-28 RX ADMIN — HEPARIN SODIUM 5000 UNIT(S): 5000 INJECTION INTRAVENOUS; SUBCUTANEOUS at 05:20

## 2022-03-28 RX ADMIN — Medication 650 MILLIGRAM(S): at 17:48

## 2022-03-28 RX ADMIN — CARVEDILOL PHOSPHATE 25 MILLIGRAM(S): 80 CAPSULE, EXTENDED RELEASE ORAL at 05:21

## 2022-03-28 RX ADMIN — ATORVASTATIN CALCIUM 40 MILLIGRAM(S): 80 TABLET, FILM COATED ORAL at 21:11

## 2022-03-28 RX ADMIN — CARVEDILOL PHOSPHATE 25 MILLIGRAM(S): 80 CAPSULE, EXTENDED RELEASE ORAL at 17:50

## 2022-03-28 NOTE — PROGRESS NOTE ADULT - SUBJECTIVE AND OBJECTIVE BOX
Patient is a 46y old  Female who presents with a chief complaint of SOB and chest pain. (28 Mar 2022 13:50)    PAST MEDICAL & SURGICAL HISTORY:  Hypertension    Diabetes    Peptic ulcer disease    CHF    INTERVAL HISTORY: in no acute distress, annoyed she is not able to sleep here  	  MEDICATIONS:  MEDICATIONS  (STANDING):  atorvastatin 40 milliGRAM(s) Oral at bedtime  carvedilol 25 milliGRAM(s) Oral every 12 hours  furosemide   Injectable 40 milliGRAM(s) IV Push daily  insulin lispro (ADMELOG) corrective regimen sliding scale   SubCutaneous three times a day before meals  pantoprazole    Tablet 40 milliGRAM(s) Oral before breakfast    MEDICATIONS  (PRN):  acetaminophen     Tablet .. 650 milliGRAM(s) Oral every 6 hours PRN Mild Pain (1 - 3)  dextrose Oral Gel 15 Gram(s) Oral once PRN Blood Glucose LESS THAN 70 milliGRAM(s)/deciliter    Vitals:  T(F): 97.2 (03-28-22 @ 16:13), Max: 98.4 (03-28-22 @ 04:30)  HR: 90 (03-28-22 @ 16:13) (74 - 91)  BP: 125/86 (03-28-22 @ 16:13) (120/87 - 138/97)  RR: 18 (03-28-22 @ 16:13) (18 - 20)  SpO2: 100% (03-28-22 @ 16:13) (96% - 100%)    03-27 @ 07:01  -  03-28 @ 07:00  --------------------------------------------------------  IN:    Oral Fluid: 1180 mL  Total IN: 1180 mL    OUT:    Voided (mL): 1000 mL  Total OUT: 1000 mL    Total NET: 180 mL    Weight (kg): 95.1 (03-26 @ 21:00)  BMI (kg/m2): 33.9 (03-26 @ 21:00)    PHYSICAL EXAM:  Neuro: Awake, responsive  CV: S1 S2 RRR + SM  Lungs: CTABL  GI: Soft, BS +, ND, NT  Extremities: Trace LE edema    TELEMETRY: RSR, short NSVTs x 2 early am     RADIOLOGY: < from: CT Chest No Cont (03.26.22 @ 15:36) >  Evaluation of the solid organs, vascular structures and GI tract is   limited without oral and IV contrast.    5.5 x 7.1 cm left adnexal mass with surrounding ascites may represent a   torsed ovary or forced pedunculated uterine myoma. Recommend pelvic   ultrasound.    Mild pulmonary edema suspected. Cardiomegaly.    Perigastric mesenteric edema may reflect gastritis or peptic ulcer   disease. Prominent adjacent lymph node. Recommend endoscopy.    < end of copied text >    DIAGNOSTIC TESTING:    [ x] Echocardiogram: < from: TTE Echo Complete w/o Contrast w/ Doppler (03.28.22 @ 10:08) >  PHYSICIAN INTERPRETATION:  Left Ventricle: The left ventricular internal cavity size is normal.  Global LV systolic function was moderately decreased. Left ventricular   ejection fraction, by visual estimation, is 40 to 45%. Spectral Doppler   shows restrictive pattern of left ventricular myocardial filling (Grade   III diastolic dysfunction). Elevated left atrial and left ventricular   end-diastolic pressures. Findings are consistent with hypertensive   cardiomyopathy.  Right Ventricle: Normal right ventricular size and function.  Left Atrium: Severely enlarged left atrium.  Right Atrium: The right atrium is normal in size.  Pericardium: There is no evidence of pericardial effusion.  Mitral Valve: Structurally normal mitral valve, with normal leaflet   excursion. Peak transmitral mean gradient equals 2.1 mmHg, calculated   mitral valve area by pressure half time equals 4.91 cm² consistent with   No evidence of mitral stenosis. Mitral valve mean gradient is 2.1 mmHg   consistent with mild mitral stenosis. Moderate mitral valve regurgitation   is seen.  Tricuspid Valve: Structurally normal tricuspid valve, with normal leaflet   excursion. Moderate tricuspid regurgitation is visualized. Estimated   pulmonary artery systolic pressure is 40.2 mmHg assuming a right atrial   pressure of 8 mmHg, which is consistent with mild pulmonary hypertension.  Aortic Valve: Normal trileaflet aortic valve with normal opening.   Moderate aortic valve regurgitation is seen.  Pulmonic Valve: Structurally normal pulmonic valve, with normal leaflet   excursion. Moderate pulmonic valve regurgitation.  Aorta: The aortic root and ascending aorta are structurally normal, with   no evidence of dilitation.  Pulmonary Artery: The main pulmonary artery is normal in size.  Venous: The inferior vena cava was normal sized, with respiratory size   variation greater than 50%.      Summary:   1. Left ventricular ejection fraction, by visual estimation, is 40 to   45%.   2. Moderately decreased global left ventricular systolic function.   3. Severely enlarged left atrium.   4. Elevated left atrial and left ventricular end-diastolic pressures.   5. Hypertensive cardiomyopathy.   6. Spectral Doppler shows restrictive pattern of left ventricular   myocardial filling (Grade III diastolic dysfunction).   7. There is moderate concentric left ventricular hypertrophy.   8. Moderate mitral valve regurgitation.   9. Moderate tricuspid regurgitation.  10. Moderate aortic regurgitation.  11. Moderate pulmonic valve regurgitation.  12. Estimated pulmonary artery systolic pressure is 40.2 mmHg assuming a   right atrial pressure of 8 mmHg, which is consistent with mild pulmonary   hypertension.  13. Mitral valve mean gradient is 2.1 mmHg consistent with mild mitral   stenosis.    < end of copied text >    LABS:	 	    CARDIAC MARKERS:  Troponin I, High Sensitivity Result: 37.4 ng/L (03-27 @ 06:21)  Troponin I, High Sensitivity Result: 46.6 ng/L (03-26 @ 13:02)    28 Mar 2022 12:39    141    |  105    |  37     ----------------------------<  223    3.7     |  30     |  2.14   27 Mar 2022 23:19    141    |  104    |  43     ----------------------------<  194    3.9     |  30     |  1.98   27 Mar 2022 09:09    141    |  104    |  37     ----------------------------<  198    4.0     |  28     |  2.02     Ca    8.7        28 Mar 2022 12:39  Phos  4.2       28 Mar 2022 12:39  Mg     2.7       28 Mar 2022 12:39    TPro  6.9    /  Alb  2.7    /  TBili  0.5    /  DBili  x      /  AST  18     /  ALT  31     /  AlkPhos  92     28 Mar 2022 12:39                        9.5    9.85  )-----------( 246      ( 27 Mar 2022 09:09 )             29.6 ,                       10.0   10.87 )-----------( 257      ( 26 Mar 2022 13:02 )             30.7   proBNP: Serum Pro-Brain Natriuretic Peptide: 53095 pg/mL (03-26 @ 13:02)    Lipid Profile: 03-27 @ 18:30  HDL/Total Cholesterol: --  HDL Chol:              48 mg/dL  Serum Chol:            123 mg/dL  Direct LDL:            --  Triglycerides:         80 mg/dL    TSH: Thyroid Stimulating Hormone, Serum: 1.040 uIU/mL (03-27 @ 09:09)  INR: 1.18 ratio (03-26 @ 13:02)

## 2022-03-28 NOTE — PHARMACOTHERAPY INTERVENTION NOTE - COMMENTS
Medication Reconciliation Note  Contact Pharmacy: [facility] ([phone]); Nadege Hernandez    Medication List:  - Omeprazole 40 mg daily (Last picked up on 3/23/22 for 90 days supply)  - Carvedilol 25 mg q12h (Last picked up on 3/23/22 for 15 days supply)  - Furosemide 40 mg daily (Last picked up on 3/23/22 for 90 days supply)  - Losartan 25 mg daily (Last picked up on 3/23/22 for 90 days supply)  - Isosorbide mononitrate ER 30 mg daily (Last picked up on 3/23/22 for 90 days supply)  - Amlodipine 10 mg daily (Last picked up in Nov. 2021 for 90 days supply)  - Rosuvastatin 20 mg daily (Last picked up in Nov. 2021 for 90 days supply)  - Aspirin 81 mg daily (Last picked up in Nov. 2021 for 90 days supply)  - Basaglar kwikpen 37 units qhs (Last picked up in Nov. 2021 for 90 days supply)

## 2022-03-28 NOTE — DIETITIAN INITIAL EVALUATION ADULT. - ORAL INTAKE PTA/DIET HISTORY
Pt reports that she eats small portions which is the way she has been eating.  Pt did own shopping/cooking, but reports that she had difficulty doing things for herself and will need an aide to assist her.   made aware of pt's request for aide assistance @ home.  Diet PTA: pt defensive in answering questions when asked about diet restrictions PTA, no restrictions -> low sodium, CHO controlled meals PTA reported.

## 2022-03-28 NOTE — PROGRESS NOTE ADULT - NS ATTEND AMEND GEN_ALL_CORE FT
Echocardiogram reviewed; likely significant element of hypertensive heart disease present with restrictive cardiomyopathy.  Continues to have nonsustained ventricular tachyarrhythmias at times and remains dyspneic despite IV diuresis.  Renal function remains stable, can switch to oral diuretics at this time.  Continued epigastric pain noted, awaiting endoscopic evaluation; no overt cardiac contraindications to planned endoscopy at this time.  Patient has expressed her desire to leave the hospital despite her continued symptoms.  If she leaves AGAINST MEDICAL ADVICE, patient strongly advised to follow-up with cardiology within 7 days of leaving the hospital for further titration of her antihypertensive medications.

## 2022-03-28 NOTE — DIETITIAN INITIAL EVALUATION ADULT. - PERTINENT LABORATORY DATA
03-27 Na141 mmol/L Glu 194 mg/dL<H> K+ 3.9 mmol/L Cr  1.98 mg/dL<H> BUN 43 mg/dL<H> 03-27 Phos 4.6 mg/dL<H> 03-27 Alb 2.5 g/dL<L> 03-27 Chol 123 mg/dL LDL --    HDL 48 mg/dL<L> Trig 80 mg/dL03-27 ALT 31 U/L AST 24 U/L Alkaline Phosphatase 89 U/L  03-27-22 @ 10:32 a1c 7.1<H>  03/28, POCT blood Glucose range 145-276 mg/dL<H>  03/27, POCT blood Glucose range 119->218 mg/dL<H>

## 2022-03-28 NOTE — DIETITIAN INITIAL EVALUATION ADULT. - PERTINENT MEDS FT
MEDICATIONS  (STANDING):  atorvastatin 40 milliGRAM(s) Oral at bedtime  carvedilol 25 milliGRAM(s) Oral every 12 hours  dextrose 5%. 1000 milliLiter(s) (100 mL/Hr) IV Continuous <Continuous>  dextrose 5%. 1000 milliLiter(s) (50 mL/Hr) IV Continuous <Continuous>  dextrose 50% Injectable 25 Gram(s) IV Push once  dextrose 50% Injectable 12.5 Gram(s) IV Push once  dextrose 50% Injectable 25 Gram(s) IV Push once  furosemide   Injectable 40 milliGRAM(s) IV Push daily  glucagon  Injectable 1 milliGRAM(s) IntraMuscular once  heparin   Injectable 5000 Unit(s) SubCutaneous every 12 hours  insulin lispro (ADMELOG) corrective regimen sliding scale   SubCutaneous three times a day before meals  pantoprazole    Tablet 40 milliGRAM(s) Oral before breakfast    MEDICATIONS  (PRN):  acetaminophen     Tablet .. 650 milliGRAM(s) Oral every 6 hours PRN Mild Pain (1 - 3)  dextrose Oral Gel 15 Gram(s) Oral once PRN Blood Glucose LESS THAN 70 milliGRAM(s)/deciliter

## 2022-03-28 NOTE — DIETITIAN INITIAL EVALUATION ADULT. - OTHER CALCULATIONS
I/O: 1180/1000(+180). Wt. 03/26, 95.1 kg    IBW: 58.9 kg       %IBW: 161%    UBW:   93.4 kg    % UBW: 101%

## 2022-03-28 NOTE — PROGRESS NOTE ADULT - SUBJECTIVE AND OBJECTIVE BOX
Patient is a 46y old  Female who presents with a chief complaint of SOB and chest pain. (28 Mar 2022 12:22)      HPI:  45yo female PMH DM, HTN, CHF presents complaining of intermittent sharp 10/10 epigastric pain x 2 months. Denies provoking or alleviating factors. Has not seen a doctor for it yet. Reports that over the last three days the chest pain has been constant and associated with sob prompting ED visit. Reports also having orthopnea over the last two days. Denies LE swelling, dizziness, n/v/d, bloody stools, constipation, fevers/chills and other associated sx. (26 Mar 2022 15:23)      INTERVAL HPI/OVERNIGHT EVENTS: Patient seen earlier today. Chart reviewed.  Abdominal pain much better. No N/V. She is very happy how she feels.     MEDICATIONS  (STANDING):  atorvastatin 40 milliGRAM(s) Oral at bedtime  carvedilol 25 milliGRAM(s) Oral every 12 hours  dextrose 5%. 1000 milliLiter(s) (100 mL/Hr) IV Continuous <Continuous>  dextrose 5%. 1000 milliLiter(s) (50 mL/Hr) IV Continuous <Continuous>  dextrose 50% Injectable 25 Gram(s) IV Push once  dextrose 50% Injectable 12.5 Gram(s) IV Push once  dextrose 50% Injectable 25 Gram(s) IV Push once  furosemide   Injectable 40 milliGRAM(s) IV Push daily  glucagon  Injectable 1 milliGRAM(s) IntraMuscular once  heparin   Injectable 5000 Unit(s) SubCutaneous every 12 hours  insulin lispro (ADMELOG) corrective regimen sliding scale   SubCutaneous three times a day before meals  pantoprazole    Tablet 40 milliGRAM(s) Oral before breakfast    MEDICATIONS  (PRN):  acetaminophen     Tablet .. 650 milliGRAM(s) Oral every 6 hours PRN Mild Pain (1 - 3)  dextrose Oral Gel 15 Gram(s) Oral once PRN Blood Glucose LESS THAN 70 milliGRAM(s)/deciliter      FAMILY HISTORY:      Allergies    lisinopril (Other)    Intolerances        PMH/PSH:  Hypertension    Diabetes    Peptic ulcer disease          REVIEW OF SYSTEMS:  CONSTITUTIONAL: No fever, weight loss,   EYES: No eye pain, visual disturbances, or discharge  ENMT:  No difficulty hearing, tinnitus, vertigo; No sinus or throat pain  NECK: No pain or stiffness  BREASTS: No pain, masses, or nipple discharge  RESPIRATORY: No cough, wheezing, chills or hemoptysis; No shortness of breath  CARDIOVASCULAR: No chest pain, palpitations, dizziness, or leg swelling  GASTROINTESTINAL: See above   GENITOURINARY: No dysuria, frequency, hematuria, or incontinence  NEUROLOGICAL: No headaches, memory loss, loss of strength, numbness, or tremors  SKIN: No itching, burning, rashes, or lesions   LYMPH NODES: No enlarged glands  ENDOCRINE: No heat or cold intolerance; No hair loss  MUSCULOSKELETAL: No joint pain or swelling; No muscle, back, or extremity pain  PSYCHIATRIC: No depression, anxiety, mood swings, or difficulty sleeping  HEME/LYMPH: No easy bruising, or bleeding gums  ALLERGY AND IMMUNOLOGIC: No hives or eczema    Vital Signs Last 24 Hrs  T(C): 36.4 (28 Mar 2022 11:16), Max: 37.4 (27 Mar 2022 16:07)  T(F): 97.5 (28 Mar 2022 11:16), Max: 99.4 (27 Mar 2022 16:07)  HR: 85 (28 Mar 2022 11:16) (83 - 91)  BP: 138/92 (28 Mar 2022 11:16) (120/87 - 138/97)  BP(mean): --  RR: 18 (28 Mar 2022 11:16) (18 - 20)  SpO2: 96% (28 Mar 2022 11:16) (96% - 100%)    PHYSICAL EXAM:  GENERAL: NAD, well-groomed, well-developed  HEAD:  Atraumatic, Normocephalic  EYES: EOMI, PERRLA, conjunctiva and sclera clear  NECK: Supple, No JVD, Normal thyroid  NERVOUS SYSTEM:  Alert & Oriented X3, Good concentration; Motor Strength 5/5 B/L upper and lower extremities;   CHEST/LUNG: Clear to percussion bilaterally; No rales, rhonchi, wheezing, or rubs  HEART: Regular rate and rhythm; No murmurs, rubs, or gallops  ABDOMEN: Soft, Nontender, Nondistended; Bowel sounds present  EXTREMITIES:  2+ Peripheral Pulses, No clubbing, cyanosis, or edema  LYMPH: No lymphadenopathy noted  SKIN: No rashes or lesions    LAB   @ 13:02  amylase --   lipase 65                           9.5    9.85  )-----------( 246      ( 27 Mar 2022 09:09 )             29.6       CBC:   09:09  WBC 9.85   Hgb 9.5   Hct 29.6   Plts 246  MCV 75.5   @ 13:02  WBC 10.87   Hgb 10.0   Hct 30.7   Plts 257  MCV 73.6      Chemistry:   @ 12:39  Na+ 141  K+ 3.7  Cl- 105  CO2 30  BUN 37  Cr 2.14      @ 23:19  Na+ 141  K+ 3.9  Cl- 104  CO2 30  BUN 43  Cr 1.98      09:09  Na+ 141  K+ 4.0  Cl- 104  CO2 28  BUN 37  Cr 2.02      @ 13:02  Na+ 140  K+ 4.2  Cl- 106  CO2 29  BUN 36  Cr 1.84         Glucose, Serum: 223 mg/dL ( @ 12:39)  Glucose, Serum: 194 mg/dL ( @ 23:19)  Glucose, Serum: 198 mg/dL ( @ 09:09)  Glucose, Serum: 164 mg/dL ( @ 13:02)      28 Mar 2022 12:39    141    |  105    |  37     ----------------------------<  223    3.7     |  30     |  2.14   27 Mar 2022 23:19    141    |  104    |  43     ----------------------------<  194    3.9     |  30     |  1.98   27 Mar 2022 09:09    141    |  104    |  37     ----------------------------<  198    4.0     |  28     |  2.02   26 Mar 2022 13:02    140    |  106    |  36     ----------------------------<  164    4.2     |  29     |  1.84     Ca    8.7        28 Mar 2022 12:39  Ca    8.8        27 Mar 2022 23:19  Ca    8.4        27 Mar 2022 09:09  Ca    8.7        26 Mar 2022 13:02  Phos  4.2       28 Mar 2022 12:39  Phos  4.6       27 Mar 2022 23:19  Phos  4.8       27 Mar 2022 09:09  Mg     2.8       27 Mar 2022 23:19  Mg     2.6       27 Mar 2022 09:09    TPro  6.9    /  Alb  2.7    /  TBili  0.5    /  DBili  x      /  AST  18     /  ALT  31     /  AlkPhos  92     28 Mar 2022 12:39  TPro  6.4    /  Alb  2.5    /  TBili  0.5    /  DBili  x      /  AST  24     /  ALT  31     /  AlkPhos  89     27 Mar 2022 09:09  TPro  7.0    /  Alb  2.7    /  TBili  0.8    /  DBili  x      /  AST  22     /  ALT  30     /  AlkPhos  92     26 Mar 2022 13:02          Urinalysis Basic - ( 26 Mar 2022 20:37 )    Color: Yellow / Appearance: Clear / S.015 / pH: x  Gluc: x / Ketone: Negative  / Bili: Negative / Urobili: Negative mg/dL   Blood: x / Protein: 30 mg/dL / Nitrite: Negative   Leuk Esterase: Negative / RBC: x / WBC x   Sq Epi: x / Non Sq Epi: Few / Bacteria: x        CAPILLARY BLOOD GLUCOSE      POCT Blood Glucose.: 267 mg/dL (28 Mar 2022 11:15)  POCT Blood Glucose.: 145 mg/dL (28 Mar 2022 07:34)  POCT Blood Glucose.: 200 mg/dL (27 Mar 2022 21:17)  POCT Blood Glucose.: 201 mg/dL (27 Mar 2022 16:25)          RADIOLOGY & ADDITIONAL TESTS:    < from: CT Abdomen and Pelvis No Cont (22 @ 15:35) >    ACC: [id number] EXAM:  CT ABDOMEN AND PELVIS                        ACC: [id number] EXAM:  CT CHEST                          PROCEDURE DATE:  2022          INTERPRETATION:  CLINICAL INFORMATION: 46 years  Female with sob.    COMPARISON: None.    CONTRAST/COMPLICATIONS:  IV Contrast: None  Oral Contrast: None  Complications: None    PROCEDURE:  CT of the Chest, Abdomen and Pelvis was performed.  Sagittal and coronal reformats were performed.    LIMITATIONS: Evaluation of the solid organs, vascular structures and GI   tract is limited without oral and IV contrast.    FINDINGS:  CHEST:  LUNGS AND LARGE AIRWAYS: Patent central airways. Diffuse groundglass   opacities secondary to pulmonary edema, air trapping or motion artifact.  PLEURA: Trace right pleural effusion. No left pleural effusion.  VESSELS: Within normal limits.  HEART: Moderate cardiomegaly. Trace pericardial effusion.  MEDIASTINUM AND SRIDHAR: No lymphadenopathy.  CHEST WALL AND LOWER NECK: Within normal limits.    ABDOMEN AND PELVIS:  LIVER: Within normal limits.  BILE DUCTS: Normal caliber.  GALLBLADDER: Cholecystectomy.  SPLEEN: Within normal limits.  PANCREAS: Within normal limits.  ADRENALS: Bilaterally thickened adrenal glands.  KIDNEYS/URETERS: Mildly atrophic kidneys. No hydronephrosis.    BLADDER: Within normal limits.  REPRODUCTIVE ORGANS: Normal size uterus. 5.5 x 7.1 cm left adnexal mass   with surrounding ascites and fluid in the cul-de-sac.    BOWEL: No bowel obstruction. Appendix is not visualized. No evidence of   inflammation in the pericecal region. Scattered colonic diverticulosis   without diverticulitis.  PERITONEUM: Mesenteric edema along the lesser curvature of the stomach   (2:164)  VESSELS: Within normal limits.  RETROPERITONEUM/LYMPH NODES:1.4 x 0.8 cm celiac axis lymph node (2:148).   Mildly prominent lymph nodes about the gastric antrum measuring up to 9   mm (2:183)  ABDOMINAL WALL: Within normal limits.  BONES: Within normal limits.    IMPRESSION:  Evaluation of the solid organs, vascular structures and GI tract is   limited without oral and IV contrast.    5.5 x 7.1 cm left adnexal mass with surrounding ascites may represent a   torsed ovary or forced pedunculated uterine myoma. Recommend pelvic   ultrasound.    Mild pulmonaryedema suspected. Cardiomegaly.    Perigastric mesenteric edema may reflect gastritis or peptic ulcer   disease. Prominent adjacent lymph node. Recommend endoscopy.    Findings were discussed with Dr. RENALDO AGRAWAL [phone] 3/26/2022 3:53 PM by   Dr. Negra Cohen with read back confirmation.    --- End of Report ---            NEGRA COHEN MD; Attending Radiologist  This document has been electronically signed. Mar 26 2022  4:04PM    < end of copied text >    Imaging Personally Reviewed:  [ ] YES  [ ] NO    Consultant(s) Notes Reviewed:  [ ] YES  [ ] NO    Care Discussed with Consultants/Other Providers [ ] YES  [ ] NO

## 2022-03-28 NOTE — PROGRESS NOTE ADULT - ASSESSMENT
45yo female PMH DM, HTN, CHF presents complaining of intermittent sharp 10/10 epigastric pain x 2 months. Denies provoking or alleviating factors. Has not seen a doctor for it yet. Reports that over the last three days the chest pain has been constant and associated with sob prompting ED visit. Reports also having orthopnea over the last two days. Denies LE swelling, dizziness, n/v/d, bloody stools, constipation, fevers/chills and other associated sx.    Plan: Admit to tele, likely systolic CHF exacerbation. Lasix 40 mg every AM, follow SMA-7 daily. TTE ordered. Renal consul reviewed in full and   appreciated. CXR is clear but CT chest notes PVC. BNP elevated on arrival at 11,890 consistent with CHF. Trop normal ranges at 46    Acute Congestive Heart Failure, HTN  ECHO  Unknown EF  C/W lasix 40 mg IV QD  Strict ins/outs, weights  Telemetry  Cardio consult  C/W statin, carvedilol 25 mg PO BID    DAVID, Cardiorenal syndrome  Cr: elevated  Unknown baseline  adjust meds as per Crcl  Nephro consult    Epigastric pain 2/2 gastritis, ?worsening peptic ulcer disease  GI consult to consider inpatient EGD  PPI, carafate  CT ab/pelvis appreciated    Simple pelvic cyst  tylenol prn  F/U with gyn outpatient  < from: US Pelvis Complete (US Pelvis Complete .) (03.26.22 @ 17:10) >  6.6 cm simple pelvic cyst  Pt has IUD removed 1 year ago, not currently menstruating     DM II  HgA1c pending, ISS , carb controlled diet     DVT PPX  heparin   45yo female PMH DM, HTN, CHF presents complaining of intermittent sharp 10/10 epigastric pain x 2 months. Denies provoking or alleviating factors. Has not seen a doctor for it yet. Reports that over the last three days the chest pain has been constant and associated with sob prompting ED visit. Reports also having orthopnea over the last two days. Denies LE swelling, dizziness, n/v/d, bloody stools, constipation, fevers/chills and other associated sx.    Plan: Admit to tele, likely systolic CHF exacerbation. Lasix 40 mg every AM, follow SMA-7 daily. TTE ordered. Renal consul reviewed in full and   appreciated. CXR is clear but CT chest notes PVC. BNP elevated on arrival at 11,890 consistent with CHF. Trop normal ranges at 46    Acute Congestive Heart Failure, HTN  ECHO pending  Unknown EF  C/W lasix 40 mg IV QD  Strict ins/outs, weights  Telemetry with episodes of sinus tachy HR >140s  Cardio consult  C/W statin, carvedilol 25 mg PO BID    DAVID, Cardiorenal syndrome  Cr: elevated  Unknown baseline  adjust meds as per Crcl  Nephro consult    Epigastric pain 2/2 gastritis, ?worsening peptic ulcer disease  GI consult for possible inpatient EGD  PPI  CT ab/pelvis appreciated    Simple pelvic cyst  tylenol prn  F/U with gyn outpatient  < from: US Pelvis Complete (US Pelvis Complete .) (03.26.22 @ 17:10) >  6.6 cm simple pelvic cyst  Pt has IUD removed 1 year ago, not currently menstruating     DM II  HgA1c: 7.1, ISS , carb controlled diet     DVT PPX  heparin

## 2022-03-28 NOTE — CHART NOTE - NSCHARTNOTEFT_GEN_A_CORE
Call made to family for collateral: Spoke to Breanna balderrama of 4 children at [phone].  As per daughter, patient was diagnosed with heart failure in November 2020.  Has been progressively getting worse. Had a cardiac cauterization after diagnosis.   Daughter unaware of medication she was supposed to be taking.   Pt went on vacation in Jan 2022 to [place] and started having shortness of breath and LE edema  Flight back was in March 2022 where she got worse on arrival and went to [facility] and was admitted from March 17-19. Pt left after being told to have a ct scan and couldn't do it due to claustrophobia.   Daughter states pt may be in denial of her diagnosis with the heart failure. Pt was also treated for H pylori 3 years ago. Call made to family for collateral: Spoke to Breanna balderrama of 4 children at [phone].  As per daughter, patient was diagnosed with heart failure in November 2020.  Has been progressively getting worse. Had a cardiac cauterization after diagnosis.   Daughter unaware of medication she was supposed to be taking.   Pt went on vacation in Jan 2022 to [place] and started having shortness of breath and LE edema  Flight back was in March 2022 where she got worse on arrival and went to [facility] and was admitted from March 17-19. Pt left after being told to have a ct scan and couldn't do it due to claustrophobia.   Daughter states pt may be in denial of her diagnosis with the heart failure. Pt was also treated for H pylori 3 years ago.    3:45PM: Call from nurse regarding patient refusing to wear cardiac monitor. As per nurse and floor nurse aid, pt is being verbally aggressive and refusing to wear the cardiac monitor. Pt is very irritable. Spoke to patient with aid at bedside. Pt states she doesn't want to wear it. Reiterated the importance of wearing the monitor due to having episodes of Vtac during hospital stay so far. Pt aware of need and does not want to wear it. Pt will let nurse Paras know when she is ready to wear the monitor. Pt saying things out loud that was incoherent and intangible. Patient became very irritable. Pt offered a anxiolytic and refused.

## 2022-03-28 NOTE — PROGRESS NOTE ADULT - SUBJECTIVE AND OBJECTIVE BOX
Patient is a 46y old  Female who presents with a chief complaint of SOB and chest pain. (27 Mar 2022 21:08)    INTERVAL HPI/OVERNIGHT EVENTS: Patients seen and examined at bedside this morning. No acute events overnight. Pt reports    MEDICATIONS  (STANDING):  atorvastatin 40 milliGRAM(s) Oral at bedtime  carvedilol 25 milliGRAM(s) Oral every 12 hours  dextrose 5%. 1000 milliLiter(s) (100 mL/Hr) IV Continuous <Continuous>  dextrose 5%. 1000 milliLiter(s) (50 mL/Hr) IV Continuous <Continuous>  dextrose 50% Injectable 25 Gram(s) IV Push once  dextrose 50% Injectable 12.5 Gram(s) IV Push once  dextrose 50% Injectable 25 Gram(s) IV Push once  furosemide   Injectable 40 milliGRAM(s) IV Push daily  glucagon  Injectable 1 milliGRAM(s) IntraMuscular once  heparin   Injectable 5000 Unit(s) SubCutaneous every 12 hours  insulin lispro (ADMELOG) corrective regimen sliding scale   SubCutaneous three times a day before meals  pantoprazole    Tablet 40 milliGRAM(s) Oral before breakfast    MEDICATIONS  (PRN):  acetaminophen     Tablet .. 650 milliGRAM(s) Oral every 6 hours PRN Mild Pain (1 - 3)  dextrose Oral Gel 15 Gram(s) Oral once PRN Blood Glucose LESS THAN 70 milliGRAM(s)/deciliter    Allergies    lisinopril (Other)    Intolerances      REVIEW OF SYSTEMS:  All other systems reviewed and are negative    Vital Signs Last 24 Hrs  T(C): 36.9 (28 Mar 2022 04:30), Max: 37.4 (27 Mar 2022 16:07)  T(F): 98.4 (28 Mar 2022 04:30), Max: 99.4 (27 Mar 2022 16:07)  HR: 91 (28 Mar 2022 04:30) (83 - 95)  BP: 138/97 (28 Mar 2022 04:30) (120/87 - 138/97)  BP(mean): --  RR: 18 (28 Mar 2022 04:30) (18 - 20)  SpO2: 97% (28 Mar 2022 04:30) (97% - 100%)  Daily     Daily Weight in k.1 (28 Mar 2022 04:30)  I&O's Summary    27 Mar 2022 07:01  -  28 Mar 2022 07:00  --------------------------------------------------------  IN: 1180 mL / OUT: 1000 mL / NET: 180 mL      CAPILLARY BLOOD GLUCOSE      POCT Blood Glucose.: 145 mg/dL (28 Mar 2022 07:34)  POCT Blood Glucose.: 200 mg/dL (27 Mar 2022 21:17)  POCT Blood Glucose.: 201 mg/dL (27 Mar 2022 16:25)  POCT Blood Glucose.: 208 mg/dL (27 Mar 2022 11:04)    PHYSICAL EXAM:  GENERAL: NAD, age appropriate  HEAD:  Atraumatic, Normocephalic  EYES: EOMI, PERRLA, conjunctiva and sclera clear  ENMT: No tonsillar erythema, exudates, or enlargement; Moist mucous membranes, Good dentition, No lesions  NECK: Supple, No JVD, Normal thyroid  NERVOUS SYSTEM:  Alert & Oriented X2, Poor concentration; Motor Strength 5/5 B/L upper and lower extremities; DTRs 2+ intact and symmetric  CHEST/LUNG: Clear to percussion bilaterally; Diminished throughout. Rales b/l bases to midlobes.   HEART: Regular rate and rhythm; No murmurs, rubs, or gallops  ABDOMEN: Soft, Nontender, Nondistended; Bowel sounds present  EXTREMITIES:  2+ Peripheral Pulses, 2+ edema b/l foot to ankles  LYMPH: No lymphadenopathy noted  SKIN: No rashes or lesions  Labs                          9.5    9.85  )-----------( 246      ( 27 Mar 2022 09:09 )             29.6         141  |  104  |  43<H>  ----------------------------<  194<H>  3.9   |  30  |  1.98<H>    Ca    8.8      27 Mar 2022 23:19  Phos  4.6       Mg     2.8         TPro  6.4  /  Alb  2.5<L>  /  TBili  0.5  /  DBili  x   /  AST  24  /  ALT  31  /  AlkPhos  89      PT/INR - ( 26 Mar 2022 13:02 )   PT: 14.1 sec;   INR: 1.18 ratio         PTT - ( 26 Mar 2022 13:02 )  PTT:29.7 sec      Urinalysis Basic - ( 26 Mar 2022 20:37 )    Color: Yellow / Appearance: Clear / S.015 / pH: x  Gluc: x / Ketone: Negative  / Bili: Negative / Urobili: Negative mg/dL   Blood: x / Protein: 30 mg/dL / Nitrite: Negative   Leuk Esterase: Negative / RBC: x / WBC x   Sq Epi: x / Non Sq Epi: Few / Bacteria: x                   Patient is a 46y old  Female who presents with a chief complaint of SOB and chest pain. (27 Mar 2022 21:08)    INTERVAL HPI/OVERNIGHT EVENTS: Patients seen and examined at bedside this morning. No acute events overnight. As per nurse, pt was very anxious last night and pacing the hallways. Pt refused anxiolytic this morning. States she "doesn't know what's going on and is just having this feeling that she can't explain". Pt states abdominal pain has improved. Denies SOB. Does not want to wear the oxygen. Reports last ECHO was maybe 2 years ago and was normal. States she didn't urinate that much. Output in 24 hrs was 1 L. Reports breakfast was very good. Acknowledged there's no external stressors but then got a call from daughter who appears to be crying.     MEDICATIONS  (STANDING):  atorvastatin 40 milliGRAM(s) Oral at bedtime  carvedilol 25 milliGRAM(s) Oral every 12 hours  dextrose 5%. 1000 milliLiter(s) (100 mL/Hr) IV Continuous <Continuous>  dextrose 5%. 1000 milliLiter(s) (50 mL/Hr) IV Continuous <Continuous>  dextrose 50% Injectable 25 Gram(s) IV Push once  dextrose 50% Injectable 12.5 Gram(s) IV Push once  dextrose 50% Injectable 25 Gram(s) IV Push once  furosemide   Injectable 40 milliGRAM(s) IV Push daily  glucagon  Injectable 1 milliGRAM(s) IntraMuscular once  heparin   Injectable 5000 Unit(s) SubCutaneous every 12 hours  insulin lispro (ADMELOG) corrective regimen sliding scale   SubCutaneous three times a day before meals  pantoprazole    Tablet 40 milliGRAM(s) Oral before breakfast    MEDICATIONS  (PRN):  acetaminophen     Tablet .. 650 milliGRAM(s) Oral every 6 hours PRN Mild Pain (1 - 3)  dextrose Oral Gel 15 Gram(s) Oral once PRN Blood Glucose LESS THAN 70 milliGRAM(s)/deciliter    Allergies    lisinopril (Other)    Intolerances      REVIEW OF SYSTEMS:  All other systems reviewed and are negative    Vital Signs Last 24 Hrs  T(C): 36.9 (28 Mar 2022 04:30), Max: 37.4 (27 Mar 2022 16:07)  T(F): 98.4 (28 Mar 2022 04:30), Max: 99.4 (27 Mar 2022 16:07)  HR: 91 (28 Mar 2022 04:30) (83 - 95)  BP: 138/97 (28 Mar 2022 04:30) (120/87 - 138/97)  BP(mean): --  RR: 18 (28 Mar 2022 04:30) (18 - 20)  SpO2: 97% (28 Mar 2022 04:30) (97% - 100%)  Daily     Daily Weight in k.1 (28 Mar 2022 04:30)  I&O's Summary    27 Mar 2022 07:01  -  28 Mar 2022 07:00  --------------------------------------------------------  IN: 1180 mL / OUT: 1000 mL / NET: 180 mL      CAPILLARY BLOOD GLUCOSE      POCT Blood Glucose.: 145 mg/dL (28 Mar 2022 07:34)  POCT Blood Glucose.: 200 mg/dL (27 Mar 2022 21:17)  POCT Blood Glucose.: 201 mg/dL (27 Mar 2022 16:25)  POCT Blood Glucose.: 208 mg/dL (27 Mar 2022 11:04)    PHYSICAL EXAM:  GENERAL: NAD, age appropriate, anxious  HEAD:  Atraumatic, Normocephalic  EYES: EOMI, PERRLA, conjunctiva and sclera clear  ENMT: No tonsillar erythema, exudates, or enlargement; Moist mucous membranes, Good dentition, No lesions  NECK: Supple, No JVD, Normal thyroid  NERVOUS SYSTEM:  Alert & Oriented X2, Poor concentration; Motor Strength 5/5 B/L upper and lower extremities; DTRs 2+ intact and symmetric  CHEST/LUNG: Clear to percussion bilaterally; Diminished throughout. Rales b/l bases to midlobes.   HEART: Regular rate and rhythm; No murmurs, rubs, or gallops  ABDOMEN: Soft, Nontender, Nondistended; Bowel sounds present  EXTREMITIES:  2+ Peripheral Pulses, 1+ edema b/l foot to midfoot  LYMPH: No lymphadenopathy noted  SKIN: No rashes or lesions  Labs                          9.5    9.85  )-----------( 246      ( 27 Mar 2022 09:09 )             29.6     03-27    141  |  104  |  43<H>  ----------------------------<  194<H>  3.9   |  30  |  1.98<H>    Ca    8.8      27 Mar 2022 23:19  Phos  4.6       Mg     2.8         TPro  6.4  /  Alb  2.5<L>  /  TBili  0.5  /  DBili  x   /  AST  24  /  ALT  31  /  AlkPhos  89      PT/INR - ( 26 Mar 2022 13:02 )   PT: 14.1 sec;   INR: 1.18 ratio         PTT - ( 26 Mar 2022 13:02 )  PTT:29.7 sec      Urinalysis Basic - ( 26 Mar 2022 20:37 )    Color: Yellow / Appearance: Clear / S.015 / pH: x  Gluc: x / Ketone: Negative  / Bili: Negative / Urobili: Negative mg/dL   Blood: x / Protein: 30 mg/dL / Nitrite: Negative   Leuk Esterase: Negative / RBC: x / WBC x   Sq Epi: x / Non Sq Epi: Few / Bacteria: x

## 2022-03-28 NOTE — PROGRESS NOTE ADULT - ASSESSMENT
46-year-old female with hypertension, diabetes, heart failure admitted with epigastric pain for about 2 months and over the last several days has been experiencing chest pain and shortness of breath.    BNP [id number] and mild pulmonary edema seen on chest CT.  Sinus on tele, had short Runs of NSVT x 2 earlier this am  Echo with EF 40-45%, Grade III DD, Severely enlarged left atrium. Mod MR/AR/CT/TR, mild pHTN    PLAN:   Lasix IV can be switched to oral, hypervolemia improved  Continue carvedilol for blood pressure management  No ACE-I/ARB 2/2 DAVID/CKD  Monitor renal function, electrolytes and daily weight. Keep fluid status negative.   Continue atorvastatin for lipid lowering.  Insulin for diabetic management.    Avoid aspirin and NSAIDs. cont PPI   Continue telemetry monitoring.   Awaiting EGD as per GI

## 2022-03-28 NOTE — PROGRESS NOTE ADULT - ASSESSMENT
47yo female PMH DM, HTN, CHF presents complaining of intermittent sharp 10/10 epigastric pain x 2 months.  Admitted with CHF exacerbation.       1.  Epigastric pain,  history of ulcer on prior EGD 3-5 years ago.  CT A/P with perigastric mesenteric edema and prominent adjacent lymph node, may reflect gastritis or peptic ulcer disease.  Volume overload and low-flow state may be contributory.  2.  CHF.  3.  DM.  4.  HTN.  5.  Anemia.  6.  DAVID, possible cardiorenal syndrome.    Recs:  - Labs and imaging reviewed.  - Monitor Hb daily.  - PPI daily, sucralfate 2-4 times a day.  - Diet as tolerated.  - Diuresis per cardiology.  - Will consider EGD tomorrow if optimized from cardiac perspective. ( Hold Carafate )

## 2022-03-28 NOTE — DIETITIAN INITIAL EVALUATION ADULT. - OTHER INFO
Pt upset about 1 liter fluid restriction order at present.   As per home meds, metformin for diabetes PTA noted.  Pt was unaware of A1C% PTA.  Pt provided c written & verbal education on nutrition therapy for heart failure, CHO controlled nutrition therapy c meal planning c plate method & A1C goal <7.0%.

## 2022-03-28 NOTE — PROGRESS NOTE ADULT - SUBJECTIVE AND OBJECTIVE BOX
[facility] NEPHROLOGY SERVICES, [facility]  NEPHROLOGY AND HYPERTENSION  [address]  [address]  [place] [address]  [phone]    MD RENEE VITAL MD ANDREY GONCHARUK, MD MADHU KORRAPATI, MD YELENA ROSENBERG, MD BINNY KOSHY, MD CHRISTOPHER CAPUTO, MD CHANI PERALES MD          Patient events noted    MEDICATIONS  (STANDING):  atorvastatin 40 milliGRAM(s) Oral at bedtime  carvedilol 25 milliGRAM(s) Oral every 12 hours  dextrose 5%. 1000 milliLiter(s) (100 mL/Hr) IV Continuous <Continuous>  dextrose 5%. 1000 milliLiter(s) (50 mL/Hr) IV Continuous <Continuous>  dextrose 50% Injectable 25 Gram(s) IV Push once  dextrose 50% Injectable 12.5 Gram(s) IV Push once  dextrose 50% Injectable 25 Gram(s) IV Push once  furosemide   Injectable 40 milliGRAM(s) IV Push daily  glucagon  Injectable 1 milliGRAM(s) IntraMuscular once  insulin lispro (ADMELOG) corrective regimen sliding scale   SubCutaneous three times a day before meals  pantoprazole    Tablet 40 milliGRAM(s) Oral before breakfast    MEDICATIONS  (PRN):  acetaminophen     Tablet .. 650 milliGRAM(s) Oral every 6 hours PRN Mild Pain (1 - 3)  dextrose Oral Gel 15 Gram(s) Oral once PRN Blood Glucose LESS THAN 70 milliGRAM(s)/deciliter      03-27-22 @ 07:01  -  03-28-22 @ 07:00  --------------------------------------------------------  IN: 1180 mL / OUT: 1000 mL / NET: 180 mL      PHYSICAL EXAM:      T(C): 36.2 (03-28-22 @ 16:13), Max: 36.9 (03-28-22 @ 04:30)  HR: 90 (03-28-22 @ 16:13) (74 - 91)  BP: 125/86 (03-28-22 @ 16:13) (120/87 - 138/97)  RR: 18 (03-28-22 @ 16:13) (18 - 20)  SpO2: 100% (03-28-22 @ 16:13) (96% - 100%)  Wt(kg): --  Lungs clear  Heart S1S2  Abd soft NT ND  Extremities:   1 edema                                    9.5    9.85  )-----------( 246      ( 27 Mar 2022 09:09 )             29.6     03-28    141  |  105  |  37<H>  ----------------------------<  223<H>  3.7   |  30  |  2.14<H>    Ca    8.7      28 Mar 2022 12:39  Phos  4.2     03-28  Mg     2.7     03-28    TPro  6.9  /  Alb  2.7<L>  /  TBili  0.5  /  DBili  x   /  AST  18  /  ALT  31  /  AlkPhos  92  03-28      LIVER FUNCTIONS - ( 28 Mar 2022 12:39 )  Alb: 2.7 g/dL / Pro: 6.9 gm/dL / ALK PHOS: 92 U/L / ALT: 31 U/L / AST: 18 U/L / GGT: x           Creatinine Trend: 2.14<--, 1.98<--, 2.02<--, 1.84<--      Assessment   DAVID CKD 2-3 cardiorenal syndrome; warranted diuresis  Gastric and adnexal lesions    Plan:  No objection to ACE; ARB, echo findings noted  For endoscopy   Stable from renal view; will need close follow up  Transition to oral diuretic at discharge;     Jamaal Elkins MD

## 2022-03-29 VITALS
OXYGEN SATURATION: 96 % | RESPIRATION RATE: 15 BRPM | DIASTOLIC BLOOD PRESSURE: 87 MMHG | HEART RATE: 79 BPM | TEMPERATURE: 98 F | SYSTOLIC BLOOD PRESSURE: 148 MMHG

## 2022-03-29 LAB
ANION GAP SERPL CALC-SCNC: 6 MMOL/L — SIGNIFICANT CHANGE UP (ref 5–17)
BUN SERPL-MCNC: 40 MG/DL — HIGH (ref 7–23)
CALCIUM SERPL-MCNC: 8.6 MG/DL — SIGNIFICANT CHANGE UP (ref 8.5–10.1)
CHLORIDE SERPL-SCNC: 107 MMOL/L — SIGNIFICANT CHANGE UP (ref 96–108)
CO2 SERPL-SCNC: 30 MMOL/L — SIGNIFICANT CHANGE UP (ref 22–31)
CREAT SERPL-MCNC: 1.68 MG/DL — HIGH (ref 0.5–1.3)
CULTURE RESULTS: SIGNIFICANT CHANGE UP
EGFR: 38 ML/MIN/1.73M2 — LOW
GLUCOSE BLDC GLUCOMTR-MCNC: 146 MG/DL — HIGH (ref 70–99)
GLUCOSE SERPL-MCNC: 157 MG/DL — HIGH (ref 70–99)
HCG SERPL-ACNC: 1 MIU/ML — SIGNIFICANT CHANGE UP
HCT VFR BLD CALC: 30.1 % — LOW (ref 34.5–45)
HGB BLD-MCNC: 9.8 G/DL — LOW (ref 11.5–15.5)
INR BLD: 1.21 RATIO — HIGH (ref 0.88–1.16)
MAGNESIUM SERPL-MCNC: 2.7 MG/DL — HIGH (ref 1.6–2.6)
MCHC RBC-ENTMCNC: 24 PG — LOW (ref 27–34)
MCHC RBC-ENTMCNC: 32.6 G/DL — SIGNIFICANT CHANGE UP (ref 32–36)
MCV RBC AUTO: 73.8 FL — LOW (ref 80–100)
NRBC # BLD: 0 /100 WBCS — SIGNIFICANT CHANGE UP (ref 0–0)
PLATELET # BLD AUTO: 224 K/UL — SIGNIFICANT CHANGE UP (ref 150–400)
POTASSIUM SERPL-MCNC: 3.8 MMOL/L — SIGNIFICANT CHANGE UP (ref 3.5–5.3)
POTASSIUM SERPL-SCNC: 3.8 MMOL/L — SIGNIFICANT CHANGE UP (ref 3.5–5.3)
PROTHROM AB SERPL-ACNC: 14.5 SEC — HIGH (ref 10.5–13.4)
RBC # BLD: 4.08 M/UL — SIGNIFICANT CHANGE UP (ref 3.8–5.2)
RBC # FLD: 18.6 % — HIGH (ref 10.3–14.5)
SODIUM SERPL-SCNC: 143 MMOL/L — SIGNIFICANT CHANGE UP (ref 135–145)
SPECIMEN SOURCE: SIGNIFICANT CHANGE UP
WBC # BLD: 9.09 K/UL — SIGNIFICANT CHANGE UP (ref 3.8–10.5)
WBC # FLD AUTO: 9.09 K/UL — SIGNIFICANT CHANGE UP (ref 3.8–10.5)

## 2022-03-29 PROCEDURE — 99233 SBSQ HOSP IP/OBS HIGH 50: CPT

## 2022-03-29 RX ORDER — FUROSEMIDE 40 MG
1 TABLET ORAL
Qty: 30 | Refills: 0
Start: 2022-03-29 | End: 2022-04-27

## 2022-03-29 RX ORDER — ROSUVASTATIN CALCIUM 5 MG/1
1 TABLET ORAL
Qty: 0 | Refills: 0 | DISCHARGE

## 2022-03-29 RX ORDER — ATORVASTATIN CALCIUM 80 MG/1
1 TABLET, FILM COATED ORAL
Qty: 30 | Refills: 0
Start: 2022-03-29 | End: 2022-04-27

## 2022-03-29 RX ORDER — ASPIRIN/CALCIUM CARB/MAGNESIUM 324 MG
1 TABLET ORAL
Qty: 30 | Refills: 0
Start: 2022-03-29 | End: 2022-04-27

## 2022-03-29 RX ORDER — PANTOPRAZOLE SODIUM 20 MG/1
1 TABLET, DELAYED RELEASE ORAL
Qty: 30 | Refills: 0
Start: 2022-03-29 | End: 2022-04-27

## 2022-03-29 RX ORDER — AMLODIPINE BESYLATE 2.5 MG/1
1 TABLET ORAL
Qty: 0 | Refills: 0 | DISCHARGE

## 2022-03-29 RX ORDER — OMEPRAZOLE 10 MG/1
1 CAPSULE, DELAYED RELEASE ORAL
Qty: 0 | Refills: 0 | DISCHARGE

## 2022-03-29 RX ORDER — ASPIRIN/CALCIUM CARB/MAGNESIUM 324 MG
1 TABLET ORAL
Qty: 0 | Refills: 0 | DISCHARGE

## 2022-03-29 RX ORDER — CARVEDILOL PHOSPHATE 80 MG/1
1 CAPSULE, EXTENDED RELEASE ORAL
Qty: 60 | Refills: 0
Start: 2022-03-29 | End: 2022-04-27

## 2022-03-29 RX ADMIN — PANTOPRAZOLE SODIUM 40 MILLIGRAM(S): 20 TABLET, DELAYED RELEASE ORAL at 06:09

## 2022-03-29 RX ADMIN — CARVEDILOL PHOSPHATE 25 MILLIGRAM(S): 80 CAPSULE, EXTENDED RELEASE ORAL at 06:09

## 2022-03-29 RX ADMIN — Medication 40 MILLIGRAM(S): at 06:09

## 2022-03-29 NOTE — DISCHARGE NOTE PROVIDER - CARE PROVIDER_API CALL
Craig Ortega)  Cardiology; Cardiovascular Disease; Nuclear Cardiology  [address] [place] [address]  Phone: [phone]  Fax: [phone]  Follow Up Time: 1 week

## 2022-03-29 NOTE — PROGRESS NOTE ADULT - REASON FOR ADMISSION
SOB and chest pain.
The patient is a 76y Female complaining of constipation
SOB and chest pain.

## 2022-03-29 NOTE — DISCHARGE NOTE PROVIDER - ATTENDING DISCHARGE PHYSICAL EXAMINATION:
PHYSICAL EXAM:  GENERAL: NAD, anxious appearing  HEAD:  Atraumatic, Normocephalic  EYES: EOMI, PERRLA, conjunctiva and sclera clear  ENMT: No tonsillar erythema, exudates, or enlargement; Moist mucous membranes, Good dentition, No lesions  NECK: Supple, No JVD, Normal thyroid  NERVOUS SYSTEM:  Alert & Oriented X2, Good concentration; Motor Strength 5/5 B/L upper and lower extremities; DTRs 2+ intact and symmetric  CHEST/LUNG: Clear to percussion bilaterally; b/l rales bases minimal  HEART: Regular rate and rhythm; No murmurs, rubs, or gallops  ABDOMEN: Soft, Nontender, Nondistended; Bowel sounds present  EXTREMITIES:  2+ Peripheral Pulses, No clubbing, cyanosis, or edema  LYMPH: No lymphadenopathy noted  SKIN: No rashes or lesions

## 2022-03-29 NOTE — CHART NOTE - NSCHARTNOTEFT_GEN_A_CORE
Notified by Rachel BROUSSARD and Dr. Arciniega that pt requesting to sign out AMA. Risks of leaving hospital AMA explained to pt, pt refused to sign form. Witnessed by RN. Dr. Arciniega aware that pt leaving hospital AMA.

## 2022-03-29 NOTE — DISCHARGE NOTE PROVIDER - HOSPITAL COURSE
45yo female PMH DM, HTN, CHF presents complaining of intermittent sharp 10/10 epigastric pain x 2 months. Denies provoking or alleviating factors. Has not seen a doctor for it yet. Reports that over the last three days the chest pain has been constant and associated with sob prompting ED visit. Reports also having orthopnea over the last two days. Denies LE swelling, dizziness, n/v/d, bloody stools, constipation, fevers/chills and other associated sx.    Plan: Admit to tele, likely systolic CHF exacerbation. Lasix 40 mg every AM, follow SMA-7 daily. TTE ordered. Renal consul reviewed in full and   appreciated. CXR is clear but CT chest notes PVC. BNP elevated on arrival at 11,890 consistent with CHF. Trop normal ranges at 46    Acute systolic Congestive Heart Failure, HTN  < from: TTE Echo Complete w/o Contrast w/ Doppler (03.28.22 @ 10:08) >  1. Left ventricular ejection fraction, by visual estimation, is 40 to   45%.   2. Moderately decreased global left ventricular systolic function.   3. Severely enlarged left atrium.   4. Elevated left atrial and left ventricular end-diastolic pressures.   5. Hypertensive cardiomyopathy.   6. Spectral Doppler shows restrictive pattern of left ventricular   myocardial filling (Grade III diastolic dysfunction).   7. There is moderate concentric left ventricular hypertrophy.   8. Moderate mitral valve regurgitation.   9. Moderate tricuspid regurgitation.  10. Moderate aortic regurgitation.  11. Moderate pulmonic valve regurgitation.  12. Estimated pulmonary artery systolic pressure is 40.2 mmHg assuming a   right atrial pressure of 8 mmHg, which is consistent with mild pulmonary   hypertension.  13. Mitral valve mean gradient is 2.1 mmHg consistent with mild mitral   stenosis.  C/W lasix 40 mg IV QD transition to oral on discharge  Strict ins/outs, weights  Telemetry with episodes of sinus tachy HR >140s  Cardio consult  C/W statin, carvedilol 25 mg PO BID  Start arb    DAVID, Cardiorenal syndrome  Cr: elevated but improving  Unknown baseline  adjust meds as per Crcl  Nephro consult    Epigastric pain 2/2 gastritis, ?worsening peptic ulcer disease  GI consult for possible inpatient EGD today  PPI  CT ab/pelvis appreciated    Simple pelvic cyst  tylenol prn  F/U with gyn outpatient  < from: US Pelvis Complete (US Pelvis Complete .) (03.26.22 @ 17:10) >  6.6 cm simple pelvic cyst  Pt has IUD removed 1 year ago, not currently menstruating     DM II  HgA1c: 7.1, ISS , carb controlled diet     DVT PPX  heparin

## 2022-03-29 NOTE — DISCHARGE NOTE PROVIDER - NSDCMRMEDTOKEN_GEN_ALL_CORE_FT
Basaglar KwikPen 100 units/mL subcutaneous solution: 37 unit(s) subcutaneous once a day (at bedtime)  Coreg 25 mg oral tablet: 1 tab(s) orally 2 times a day  isosorbide mononitrate 30 mg oral tablet, extended release: 1 tab(s) orally once a day (in the morning)  Lasix 40 mg oral tablet: 1 tab(s) orally once a day  losartan 25 mg oral tablet: 1 tab(s) orally once a day  metFORMIN 500 mg oral tablet: 1 tab(s) orally 2 times a day

## 2022-03-29 NOTE — PROGRESS NOTE ADULT - ASSESSMENT
45yo female PMH DM, HTN, CHF presents complaining of intermittent sharp 10/10 epigastric pain x 2 months. Denies provoking or alleviating factors. Has not seen a doctor for it yet. Reports that over the last three days the chest pain has been constant and associated with sob prompting ED visit. Reports also having orthopnea over the last two days. Denies LE swelling, dizziness, n/v/d, bloody stools, constipation, fevers/chills and other associated sx.    Plan: Admit to tele, likely systolic CHF exacerbation. Lasix 40 mg every AM, follow SMA-7 daily. TTE ordered. Renal consul reviewed in full and   appreciated. CXR is clear but CT chest notes PVC. BNP elevated on arrival at 11,890 consistent with CHF. Trop normal ranges at 46    Acute systolic Congestive Heart Failure, HTN  < from: TTE Echo Complete w/o Contrast w/ Doppler (03.28.22 @ 10:08) >  1. Left ventricular ejection fraction, by visual estimation, is 40 to   45%.   2. Moderately decreased global left ventricular systolic function.   3. Severely enlarged left atrium.   4. Elevated left atrial and left ventricular end-diastolic pressures.   5. Hypertensive cardiomyopathy.   6. Spectral Doppler shows restrictive pattern of left ventricular   myocardial filling (Grade III diastolic dysfunction).   7. There is moderate concentric left ventricular hypertrophy.   8. Moderate mitral valve regurgitation.   9. Moderate tricuspid regurgitation.  10. Moderate aortic regurgitation.  11. Moderate pulmonic valve regurgitation.  12. Estimated pulmonary artery systolic pressure is 40.2 mmHg assuming a   right atrial pressure of 8 mmHg, which is consistent with mild pulmonary   hypertension.  13. Mitral valve mean gradient is 2.1 mmHg consistent with mild mitral   stenosis.  C/W lasix 40 mg IV QD transition to oral on discharge  Strict ins/outs, weights  Telemetry with episodes of sinus tachy HR >140s  Cardio consult  C/W statin, carvedilol 25 mg PO BID  Start arb    DAVID, Cardiorenal syndrome  Cr: elevated but improving  Unknown baseline  adjust meds as per Crcl  Nephro consult    Epigastric pain 2/2 gastritis, ?worsening peptic ulcer disease  GI consult for possible inpatient EGD today  PPI  CT ab/pelvis appreciated    Simple pelvic cyst  tylenol prn  F/U with gyn outpatient  < from: US Pelvis Complete (US Pelvis Complete .) (03.26.22 @ 17:10) >  6.6 cm simple pelvic cyst  Pt has IUD removed 1 year ago, not currently menstruating     DM II  HgA1c: 7.1, ISS , carb controlled diet     DVT PPX  heparin   47yo female PMH DM, HTN, CHF presents complaining of intermittent sharp 10/10 epigastric pain x 2 months. Denies provoking or alleviating factors. Has not seen a doctor for it yet. Reports that over the last three days the chest pain has been constant and associated with sob prompting ED visit. Reports also having orthopnea over the last two days. Denies LE swelling, dizziness, n/v/d, bloody stools, constipation, fevers/chills and other associated sx.    Plan: Admit to tele, likely systolic CHF exacerbation. Lasix 40 mg every AM, follow SMA-7 daily. TTE ordered. Renal consul reviewed in full and   appreciated. CXR is clear but CT chest notes PVC. BNP elevated on arrival at 11,890 consistent with CHF. Trop normal ranges at 46    Acute systolic Congestive Heart Failure, HTN  (3/29) [facility] Nikki Soler MD [phone]. Called primary doctor for collateral     < from: TTE Echo Complete w/o Contrast w/ Doppler (03.28.22 @ 10:08) >  1. Left ventricular ejection fraction, by visual estimation, is 40 to   45%.   2. Moderately decreased global left ventricular systolic function.   3. Severely enlarged left atrium.   4. Elevated left atrial and left ventricular end-diastolic pressures.   5. Hypertensive cardiomyopathy.   6. Spectral Doppler shows restrictive pattern of left ventricular   myocardial filling (Grade III diastolic dysfunction).   7. There is moderate concentric left ventricular hypertrophy.   8. Moderate mitral valve regurgitation.   9. Moderate tricuspid regurgitation.  10. Moderate aortic regurgitation.  11. Moderate pulmonic valve regurgitation.  12. Estimated pulmonary artery systolic pressure is 40.2 mmHg assuming a   right atrial pressure of 8 mmHg, which is consistent with mild pulmonary   hypertension.  13. Mitral valve mean gradient is 2.1 mmHg consistent with mild mitral   stenosis.  C/W lasix 40 mg IV QD transition to oral on discharge  Strict ins/outs, weights  Telemetry with episodes of sinus tachy HR >140s  Cardio consult  C/W statin, carvedilol 25 mg PO BID  Start arb    DAVID, Cardiorenal syndrome  Cr: elevated but improving  Unknown baseline  adjust meds as per Crcl  Nephro consult    Epigastric pain 2/2 gastritis, ?worsening peptic ulcer disease  GI consult for possible inpatient EGD today  PPI  CT ab/pelvis appreciated    Simple pelvic cyst  tylenol prn  F/U with gyn outpatient  < from: US Pelvis Complete (US Pelvis Complete .) (03.26.22 @ 17:10) >  6.6 cm simple pelvic cyst  Pt has IUD removed 1 year ago, not currently menstruating     DM II  HgA1c: 7.1, ISS , carb controlled diet     DVT PPX  heparin   45yo female PMH DM, HTN, CHF presents complaining of intermittent sharp 10/10 epigastric pain x 2 months. Denies provoking or alleviating factors. Has not seen a doctor for it yet. Reports that over the last three days the chest pain has been constant and associated with sob prompting ED visit. Reports also having orthopnea over the last two days. Denies LE swelling, dizziness, n/v/d, bloody stools, constipation, fevers/chills and other associated sx.    Plan: Admit to tele, likely systolic CHF exacerbation. Lasix 40 mg every AM, follow SMA-7 daily. TTE ordered. Renal consul reviewed in full and   appreciated. CXR is clear but CT chest notes PVC. BNP elevated on arrival at 11,890 consistent with CHF. Trop normal ranges at 46    Acute systolic Congestive Heart Failure, HTN  (3/29) [facility] Nikki Soler MD [phone]. Called primary doctor for collateral. Recieved call from on call doctor. Pt was treated for NSTEMI and systolic CHF exacerbation at [facility]. EF during admission was 30%. Pt does not have a outpatient cardiologist. Was only seen by inpatient cardiologist. Pt saw PMD after discharge from [facility] regarding abdominal pain. Relayed to on call doctor to follow up with PMD within 1-2 day because pt will most likely leave AMA from here.  Obtain ABG and CTA chest to r/o PE  < from: TTE Echo Complete w/o Contrast w/ Doppler (03.28.22 @ 10:08) >  1. Left ventricular ejection fraction, by visual estimation, is 40 to   45%.   2. Moderately decreased global left ventricular systolic function.   3. Severely enlarged left atrium.   4. Elevated left atrial and left ventricular end-diastolic pressures.   5. Hypertensive cardiomyopathy.   6. Spectral Doppler shows restrictive pattern of left ventricular   myocardial filling (Grade III diastolic dysfunction).   7. There is moderate concentric left ventricular hypertrophy.   8. Moderate mitral valve regurgitation.   9. Moderate tricuspid regurgitation.  10. Moderate aortic regurgitation.  11. Moderate pulmonic valve regurgitation.  12. Estimated pulmonary artery systolic pressure is 40.2 mmHg assuming a   right atrial pressure of 8 mmHg, which is consistent with mild pulmonary   hypertension.  13. Mitral valve mean gradient is 2.1 mmHg consistent with mild mitral   stenosis.  C/W lasix 40 mg IV QD transition to oral on discharge  Strict ins/outs, weights  Telemetry with episodes of sinus tachy  Cardio consult  C/W statin, carvedilol 25 mg PO BID  Start arb    DAVID, Cardiorenal syndrome  Cr: elevated but improving  Unknown baseline  adjust meds as per Crcl  Nephro consult    Epigastric pain 2/2 gastritis, ?worsening peptic ulcer disease  GI consult for possible inpatient EGD today  PPI  CT ab/pelvis appreciated    Simple pelvic cyst  tylenol prn  F/U with gyn outpatient  < from: US Pelvis Complete (US Pelvis Complete .) (03.26.22 @ 17:10) >  6.6 cm simple pelvic cyst  Pt has IUD removed 1 year ago, not currently menstruating     DM II  HgA1c: 7.1, ISS , carb controlled diet     DVT PPX  heparin

## 2022-03-29 NOTE — DISCHARGE NOTE PROVIDER - NSDCCPCAREPLAN_GEN_ALL_CORE_FT
PRINCIPAL DISCHARGE DIAGNOSIS  Diagnosis: Acute on chronic systolic congestive heart failure  Assessment and Plan of Treatment:       SECONDARY DISCHARGE DIAGNOSES  Diagnosis: Acute renal failure  Assessment and Plan of Treatment:     Diagnosis: Abdominal pain  Assessment and Plan of Treatment:

## 2022-03-29 NOTE — DISCHARGE NOTE PROVIDER - CARE PROVIDERS DIRECT ADDRESSES
[email or web address]

## 2022-03-29 NOTE — PROGRESS NOTE ADULT - PROVIDER SPECIALTY LIST ADULT
Gastroenterology
Nephrology
Nephrology
Cardiology
Hospitalist
Hospitalist
Gastroenterology
Hospitalist

## 2022-03-29 NOTE — PROGRESS NOTE ADULT - SUBJECTIVE AND OBJECTIVE BOX
Procedure:           Upper GI endoscopy 03-29-22 @ 15:17    Indications:                PUD symptoms  / abnormal CT scan     Monitored Anesthesia Care Provided by :     ____________________________________________________________________________________________________  Procedure:           Pre-Anesthesia Assessment:                       - Prior to the procedure, a History and Physical was performed, and patient                        medications and allergies were reviewed. The patient is competent. The risks                        and benefits of the procedure and the sedation options and risks were                        discussed with the patient. All questions were answered and informed consent                        was obtained. Patient identification and proposed procedure were verified by                        the physician, the nurse and the anesthesiologist in the procedure room.                        Mental Status Examination:     alert and oriented. Airway Examination: normal                        oropharyngeal airway and neck mobility. Respiratory Examination: clear to                        auscultation. CV Examination: normal. Prophylactic Antibiotics: The patient                        does not require prophylactic antibiotics.                        Grade Assessment: -. After                        reviewing the risks and benefits, the patient was deemed in satisfactory                        condition to undergo the procedure. The anesthesia plan was to use monitored                        anesthesia care (MAC). Immediately prior to administration of medications,                        the patient was re-assessed for adequacy to receive sedatives. The heart        		     rate, respiratory rate, oxygen saturations, blood pressure, adequacy of                        pulmonary ventilation, and response to care were monitored throughout the                        procedure. The physical status of the patient was re-assessed after the                        procedure.                       After obtaining informed consent, the endoscope was passed under direct                        vision. Throughout the procedure, the patient's blood pressure, pulse, and                        oxygen saturations were monitored continuously. The Endoscope was introduced                        through the mouth, and advanced to the second part of duodenum. Retroflexion was done in the stomach Case termonated in midst of procedure    ESOPHAGUS:    WNL    STOMACH:    Edematous folds antrum, normal body. Fundus / cardia not visualized    DUODENUM: Bulb WNL, sweep not visualized      Assessment : As Above     PLAN :  PPI / Carafte. Repeat EGD when medically stabilized

## 2022-04-04 DIAGNOSIS — K29.70 GASTRITIS, UNSPECIFIED, WITHOUT BLEEDING: ICD-10-CM

## 2022-04-04 DIAGNOSIS — E11.22 TYPE 2 DIABETES MELLITUS WITH DIABETIC CHRONIC KIDNEY DISEASE: ICD-10-CM

## 2022-04-04 DIAGNOSIS — I13.0 HYPERTENSIVE HEART AND CHRONIC KIDNEY DISEASE WITH HEART FAILURE AND STAGE 1 THROUGH STAGE 4 CHRONIC KIDNEY DISEASE, OR UNSPECIFIED CHRONIC KIDNEY DISEASE: ICD-10-CM

## 2022-04-04 DIAGNOSIS — N94.89 OTHER SPECIFIED CONDITIONS ASSOCIATED WITH FEMALE GENITAL ORGANS AND MENSTRUAL CYCLE: ICD-10-CM

## 2022-04-04 DIAGNOSIS — D63.1 ANEMIA IN CHRONIC KIDNEY DISEASE: ICD-10-CM

## 2022-04-04 DIAGNOSIS — Z53.29 PROCEDURE AND TREATMENT NOT CARRIED OUT BECAUSE OF PATIENT'S DECISION FOR OTHER REASONS: ICD-10-CM

## 2022-04-04 DIAGNOSIS — N18.30 CHRONIC KIDNEY DISEASE, STAGE 3 UNSPECIFIED: ICD-10-CM

## 2022-04-04 DIAGNOSIS — N17.9 ACUTE KIDNEY FAILURE, UNSPECIFIED: ICD-10-CM

## 2022-04-04 DIAGNOSIS — K27.9 PEPTIC ULCER, SITE UNSPECIFIED, UNSPECIFIED AS ACUTE OR CHRONIC, WITHOUT HEMORRHAGE OR PERFORATION: ICD-10-CM

## 2022-04-04 DIAGNOSIS — R10.13 EPIGASTRIC PAIN: ICD-10-CM

## 2022-04-04 DIAGNOSIS — I08.3 COMBINED RHEUMATIC DISORDERS OF MITRAL, AORTIC AND TRICUSPID VALVES: ICD-10-CM

## 2022-04-04 DIAGNOSIS — I50.23 ACUTE ON CHRONIC SYSTOLIC (CONGESTIVE) HEART FAILURE: ICD-10-CM

## 2022-08-12 NOTE — PATIENT PROFILE ADULT - DO YOU FEEL UNSAFE AT HOME, WORK, OR SCHOOL?
Per pt mom: Cough and tugging at ear with slight runny nose, no fever, denies N/V/D. Younger brother with similar (worse) sx. Starting school on 8/15 and wanted to get pt checked out to ensure he can go on the first day    1. Have you been to the ER, urgent care clinic since your last visit? Hospitalized since your last visit? No    2. Have you seen or consulted any other health care providers outside of the [address] [place] [address] since your last visit? Include any pap smears or colon screening. No    Chief Complaint   Patient presents with    Cold Symptoms    Ear Pain     Visit Vitals  Pulse 88   Temp 97.6 °F (36.4 °C) (Axillary)   Resp 26   Ht (!) 3' 4.16\" (1.02 m)   Wt 39 lb 2 oz (17.7 kg)   SpO2 100%   BMI 17.06 kg/m²     Abuse Screening 4/23/2021   Are there any signs of abuse or neglect?  No no

## 2022-12-19 NOTE — DISCHARGE NOTE PROVIDER - ATTENDING DISCHARGE PHYSICAL EXAM:
Attending Discharge Physical Examination:
Labor at Term/Reduced Fetal Movement/Hypertensive Disorder

## 2024-09-09 NOTE — PATIENT PROFILE ADULT - FUNCTIONAL ASSESSMENT - DAILY ACTIVITY 4.
naproxen 125 mg/5 mL oral suspension: 11.6 milliliter(s) orally every 12 hours  
4 = No assist / stand by assistance

## 2024-09-15 ENCOUNTER — INPATIENT (INPATIENT)
Facility: HOSPITAL | Age: 49
LOS: 3 days | Discharge: HOME | End: 2024-09-19
Attending: INTERNAL MEDICINE | Admitting: INTERNAL MEDICINE
Payer: MEDICAID

## 2024-09-15 VITALS
HEIGHT: 67 IN | RESPIRATION RATE: 27 BRPM | DIASTOLIC BLOOD PRESSURE: 128 MMHG | WEIGHT: 225.09 LBS | SYSTOLIC BLOOD PRESSURE: 170 MMHG | OXYGEN SATURATION: 92 % | HEART RATE: 140 BPM

## 2024-09-15 LAB
ALBUMIN SERPL ELPH-MCNC: 3.3 G/DL — SIGNIFICANT CHANGE UP (ref 3.3–5)
ALP SERPL-CCNC: 153 U/L — HIGH (ref 40–120)
ALT FLD-CCNC: 25 U/L — SIGNIFICANT CHANGE UP (ref 12–78)
ANION GAP SERPL CALC-SCNC: 5 MMOL/L — SIGNIFICANT CHANGE UP (ref 5–17)
AST SERPL-CCNC: 16 U/L — SIGNIFICANT CHANGE UP (ref 15–37)
BASE EXCESS BLDA CALC-SCNC: 1 MMOL/L — SIGNIFICANT CHANGE UP (ref -2–3)
BASE EXCESS BLDV CALC-SCNC: -0.2 MMOL/L — SIGNIFICANT CHANGE UP (ref -2–3)
BASOPHILS # BLD AUTO: 0.05 K/UL — SIGNIFICANT CHANGE UP (ref 0–0.2)
BASOPHILS NFR BLD AUTO: 0.3 % — SIGNIFICANT CHANGE UP (ref 0–2)
BILIRUB SERPL-MCNC: 0.4 MG/DL — SIGNIFICANT CHANGE UP (ref 0.2–1.2)
BLOOD GAS COMMENTS ARTERIAL: SIGNIFICANT CHANGE UP
BLOOD GAS COMMENTS, VENOUS: SIGNIFICANT CHANGE UP
BUN SERPL-MCNC: 17 MG/DL — SIGNIFICANT CHANGE UP (ref 7–23)
CALCIUM SERPL-MCNC: 8.8 MG/DL — SIGNIFICANT CHANGE UP (ref 8.5–10.1)
CHLORIDE BLDV-SCNC: 103 MMOL/L — SIGNIFICANT CHANGE UP (ref 98–107)
CHLORIDE SERPL-SCNC: 103 MMOL/L — SIGNIFICANT CHANGE UP (ref 96–108)
CK MB BLD-MCNC: 3.4 % — SIGNIFICANT CHANGE UP (ref 0–3.5)
CK MB CFR SERPL CALC: 3.2 NG/ML — SIGNIFICANT CHANGE UP (ref 0.5–3.6)
CK SERPL-CCNC: 95 U/L — SIGNIFICANT CHANGE UP (ref 26–192)
CO2 BLDA-SCNC: 29 MMOL/L — HIGH (ref 19–24)
CO2 BLDV-SCNC: 32 MMOL/L — HIGH (ref 22–26)
CO2 SERPL-SCNC: 29 MMOL/L — SIGNIFICANT CHANGE UP (ref 22–31)
CREAT SERPL-MCNC: 1.91 MG/DL — HIGH (ref 0.5–1.3)
EGFR: 32 ML/MIN/1.73M2 — LOW
EOSINOPHIL # BLD AUTO: 0.13 K/UL — SIGNIFICANT CHANGE UP (ref 0–0.5)
EOSINOPHIL NFR BLD AUTO: 0.9 % — SIGNIFICANT CHANGE UP (ref 0–6)
GAS PNL BLDA: SIGNIFICANT CHANGE UP
GAS PNL BLDV: 137 MMOL/L — SIGNIFICANT CHANGE UP (ref 136–145)
GAS PNL BLDV: SIGNIFICANT CHANGE UP
GAS PNL BLDV: SIGNIFICANT CHANGE UP
GLUCOSE BLDC GLUCOMTR-MCNC: 247 MG/DL — HIGH (ref 70–99)
GLUCOSE BLDV-MCNC: 449 MG/DL — HIGH (ref 65–95)
GLUCOSE SERPL-MCNC: 401 MG/DL — HIGH (ref 70–99)
HCG SERPL-ACNC: 2 MIU/ML — SIGNIFICANT CHANGE UP
HCO3 BLDA-SCNC: 27 MMOL/L — SIGNIFICANT CHANGE UP (ref 21–28)
HCO3 BLDV-SCNC: 30 MMOL/L — HIGH (ref 22–28)
HCT VFR BLD CALC: 38 % — SIGNIFICANT CHANGE UP (ref 34.5–45)
HCT VFR BLDA CALC: 41 % — SIGNIFICANT CHANGE UP (ref 37–47)
HGB BLD CALC-MCNC: 13.6 G/DL — SIGNIFICANT CHANGE UP (ref 11.7–16.1)
HGB BLD-MCNC: 13 G/DL — SIGNIFICANT CHANGE UP (ref 11.5–15.5)
HOROWITZ INDEX BLDA+IHG-RTO: 40 — SIGNIFICANT CHANGE UP
HOROWITZ INDEX BLDV+IHG-RTO: 100 — SIGNIFICANT CHANGE UP
IMM GRANULOCYTES NFR BLD AUTO: 0.7 % — SIGNIFICANT CHANGE UP (ref 0–0.9)
LACTATE BLDV-MCNC: 1.5 MMOL/L — HIGH (ref 0.56–1.39)
LYMPHOCYTES # BLD AUTO: 18.1 % — SIGNIFICANT CHANGE UP (ref 13–44)
LYMPHOCYTES # BLD AUTO: 2.74 K/UL — SIGNIFICANT CHANGE UP (ref 1–3.3)
MAGNESIUM SERPL-MCNC: 1.9 MG/DL — SIGNIFICANT CHANGE UP (ref 1.6–2.6)
MCHC RBC-ENTMCNC: 25.8 PG — LOW (ref 27–34)
MCHC RBC-ENTMCNC: 34.2 G/DL — SIGNIFICANT CHANGE UP (ref 32–36)
MCV RBC AUTO: 75.4 FL — LOW (ref 80–100)
MONOCYTES # BLD AUTO: 0.73 K/UL — SIGNIFICANT CHANGE UP (ref 0–0.9)
MONOCYTES NFR BLD AUTO: 4.8 % — SIGNIFICANT CHANGE UP (ref 2–14)
NEUTROPHILS # BLD AUTO: 11.41 K/UL — HIGH (ref 1.8–7.4)
NEUTROPHILS NFR BLD AUTO: 75.2 % — SIGNIFICANT CHANGE UP (ref 43–77)
NRBC # BLD: 0 /100 WBCS — SIGNIFICANT CHANGE UP (ref 0–0)
NT-PROBNP SERPL-SCNC: 7308 PG/ML — HIGH (ref 0–125)
PCO2 BLDA: 48 MMHG — HIGH (ref 32–46)
PCO2 BLDV: 72 MMHG — CRITICAL HIGH (ref 42–55)
PH BLDA: 7.36 — SIGNIFICANT CHANGE UP (ref 7.35–7.45)
PH BLDV: 7.22 — LOW (ref 7.32–7.43)
PLATELET # BLD AUTO: 203 K/UL — SIGNIFICANT CHANGE UP (ref 150–400)
PO2 BLDA: 78 MMHG — LOW (ref 83–108)
PO2 BLDV: 46 MMHG — HIGH (ref 25–45)
POTASSIUM BLDV-SCNC: 3.9 MMOL/L — SIGNIFICANT CHANGE UP (ref 3.5–5.1)
POTASSIUM SERPL-MCNC: 3.6 MMOL/L — SIGNIFICANT CHANGE UP (ref 3.5–5.3)
POTASSIUM SERPL-SCNC: 3.6 MMOL/L — SIGNIFICANT CHANGE UP (ref 3.5–5.3)
PROT SERPL-MCNC: 8 GM/DL — SIGNIFICANT CHANGE UP (ref 6–8.3)
RBC # BLD: 5.04 M/UL — SIGNIFICANT CHANGE UP (ref 3.8–5.2)
RBC # FLD: 15.9 % — HIGH (ref 10.3–14.5)
SAO2 % BLDA: 97.5 % — SIGNIFICANT CHANGE UP (ref 94–98)
SAO2 % BLDV: 69.8 % — LOW (ref 94–98)
SODIUM SERPL-SCNC: 137 MMOL/L — SIGNIFICANT CHANGE UP (ref 135–145)
TROPONIN I, HIGH SENSITIVITY RESULT: 62.5 NG/L — HIGH
TROPONIN I, HIGH SENSITIVITY RESULT: 99.2 NG/L — HIGH
WBC # BLD: 15.16 K/UL — HIGH (ref 3.8–10.5)
WBC # FLD AUTO: 15.16 K/UL — HIGH (ref 3.8–10.5)

## 2024-09-15 PROCEDURE — 99291 CRITICAL CARE FIRST HOUR: CPT

## 2024-09-15 PROCEDURE — 99291 CRITICAL CARE FIRST HOUR: CPT | Mod: 25

## 2024-09-15 PROCEDURE — 71045 X-RAY EXAM CHEST 1 VIEW: CPT | Mod: 26

## 2024-09-15 PROCEDURE — 93010 ELECTROCARDIOGRAM REPORT: CPT

## 2024-09-15 RX ORDER — SACUBITRIL AND VALSARTAN 49; 51 MG/1; MG/1
1 TABLET, FILM COATED ORAL
Refills: 0 | Status: DISCONTINUED | OUTPATIENT
Start: 2024-09-15 | End: 2024-09-19

## 2024-09-15 RX ORDER — FUROSEMIDE 40 MG
40 TABLET ORAL EVERY 12 HOURS
Refills: 0 | Status: DISCONTINUED | OUTPATIENT
Start: 2024-09-15 | End: 2024-09-17

## 2024-09-15 RX ORDER — ROSUVASTATIN CALCIUM 10 MG/1
20 TABLET ORAL AT BEDTIME
Refills: 0 | Status: DISCONTINUED | OUTPATIENT
Start: 2024-09-15 | End: 2024-09-19

## 2024-09-15 RX ORDER — ASPIRIN 81 MG
325 TABLET, DELAYED RELEASE (ENTERIC COATED) ORAL ONCE
Refills: 0 | Status: COMPLETED | OUTPATIENT
Start: 2024-09-15 | End: 2024-09-15

## 2024-09-15 RX ORDER — MAGNESIUM, ALUMINUM HYDROXIDE 200-225/5
30 SUSPENSION, ORAL (FINAL DOSE FORM) ORAL EVERY 4 HOURS
Refills: 0 | Status: DISCONTINUED | OUTPATIENT
Start: 2024-09-15 | End: 2024-09-19

## 2024-09-15 RX ORDER — IPRATROPIUM BROMIDE AND ALBUTEROL SULFATE .5; 3 MG/3ML; MG/3ML
3 SOLUTION RESPIRATORY (INHALATION) ONCE
Refills: 0 | Status: COMPLETED | OUTPATIENT
Start: 2024-09-15 | End: 2024-09-15

## 2024-09-15 RX ORDER — IPRATROPIUM BROMIDE AND ALBUTEROL SULFATE .5; 3 MG/3ML; MG/3ML
3 SOLUTION RESPIRATORY (INHALATION) EVERY 6 HOURS
Refills: 0 | Status: DISCONTINUED | OUTPATIENT
Start: 2024-09-15 | End: 2024-09-19

## 2024-09-15 RX ORDER — ACETAMINOPHEN 325 MG/1
650 TABLET ORAL EVERY 6 HOURS
Refills: 0 | Status: DISCONTINUED | OUTPATIENT
Start: 2024-09-15 | End: 2024-09-19

## 2024-09-15 RX ORDER — SACUBITRIL AND VALSARTAN 49; 51 MG/1; MG/1
1 TABLET, FILM COATED ORAL
Refills: 0 | DISCHARGE

## 2024-09-15 RX ORDER — FUROSEMIDE 40 MG
40 TABLET ORAL ONCE
Refills: 0 | Status: COMPLETED | OUTPATIENT
Start: 2024-09-15 | End: 2024-09-15

## 2024-09-15 RX ORDER — ASPIRIN 81 MG
81 TABLET, DELAYED RELEASE (ENTERIC COATED) ORAL DAILY
Refills: 0 | Status: DISCONTINUED | OUTPATIENT
Start: 2024-09-16 | End: 2024-09-19

## 2024-09-15 RX ORDER — ONDANSETRON 2 MG/ML
4 INJECTION, SOLUTION INTRAMUSCULAR; INTRAVENOUS EVERY 8 HOURS
Refills: 0 | Status: DISCONTINUED | OUTPATIENT
Start: 2024-09-15 | End: 2024-09-19

## 2024-09-15 RX ORDER — LINAGLIPTIN 5 MG/1
1 TABLET, FILM COATED ORAL
Refills: 0 | DISCHARGE

## 2024-09-15 RX ORDER — EMPAGLIFLOZIN 10 MG/1
1 TABLET, FILM COATED ORAL
Refills: 0 | DISCHARGE

## 2024-09-15 RX ORDER — CARVEDILOL 6.25 MG/1
25 TABLET ORAL EVERY 12 HOURS
Refills: 0 | Status: DISCONTINUED | OUTPATIENT
Start: 2024-09-15 | End: 2024-09-19

## 2024-09-15 RX ORDER — HEPARIN SODIUM,BOVINE 1000/ML
5000 VIAL (ML) INJECTION EVERY 8 HOURS
Refills: 0 | Status: DISCONTINUED | OUTPATIENT
Start: 2024-09-15 | End: 2024-09-19

## 2024-09-15 RX ORDER — NITROGLYCERIN 0.2MG/HR
0.4 PATCH, TRANSDERMAL 24 HOURS TRANSDERMAL ONCE
Refills: 0 | Status: COMPLETED | OUTPATIENT
Start: 2024-09-15 | End: 2024-09-15

## 2024-09-15 RX ORDER — ROSUVASTATIN CALCIUM 10 MG/1
1 TABLET ORAL
Refills: 0 | DISCHARGE

## 2024-09-15 RX ADMIN — SACUBITRIL AND VALSARTAN 1 TABLET(S): 49; 51 TABLET, FILM COATED ORAL at 17:28

## 2024-09-15 RX ADMIN — Medication 325 MILLIGRAM(S): at 08:55

## 2024-09-15 RX ADMIN — CARVEDILOL 25 MILLIGRAM(S): 6.25 TABLET ORAL at 11:10

## 2024-09-15 RX ADMIN — Medication 5000 UNIT(S): at 21:34

## 2024-09-15 RX ADMIN — Medication 0.4 MILLIGRAM(S): at 05:47

## 2024-09-15 RX ADMIN — CARVEDILOL 25 MILLIGRAM(S): 6.25 TABLET ORAL at 23:24

## 2024-09-15 RX ADMIN — Medication 40 MILLIGRAM(S): at 05:47

## 2024-09-15 RX ADMIN — Medication 5000 UNIT(S): at 14:18

## 2024-09-15 RX ADMIN — IPRATROPIUM BROMIDE AND ALBUTEROL SULFATE 3 MILLILITER(S): .5; 3 SOLUTION RESPIRATORY (INHALATION) at 10:08

## 2024-09-15 RX ADMIN — Medication 40 MILLIGRAM(S): at 14:18

## 2024-09-15 NOTE — H&P ADULT - ASSESSMENT
Ms. Hubbard is a 50 y/o female from home w/ PMH of DM, HTN, CHF, CKD presents to the hospital w/ shortness of breath and orthopnea x 1 day. Admitted for AHRF 2/2 CHF exacerbation     A/P:  #Acute hypoxic respiratory failure- 3L NC  #Acute on Chronic Combined systolic and diastolic HF exacerbation   #NSVT  #HTN  #DM  #Obesity   #Leukocytosis- possible reactive   #CKD stage 3   #DVT ppx     Plan:   Ms. Hubbard is a 50 y/o female from home w/ PMH of DM, HTN, CHF, CKD presents to the hospital w/ shortness of breath and orthopnea x 1 day. Admitted for AHRF 2/2 CHF exacerbation     A/P:  #Acute hypoxic respiratory failure- 3L NC  #Acute on Chronic Combined systolic and diastolic HF exacerbation vs flash pulm edema from HTN   #NSVT  #HTN cardiomyopathy   #HTN  #DM  #Obesity   #Leukocytosis- possible reactive   #CKD stage 3   #DVT ppx     Plan:  -Pt p/w sob, pro-BNP 7300. Chest x-ray w/ pulm venous congestion.   -s/p IV Lasix in ED after which symptoms improved.  -last ECHO in our EMR from 2022 showed EF 40-45%, grade3 DD and HTN cardiomyopathy.   -Start Lasix 40mg IV BID, monitor daily weights, I&O  -Resume GDMT- Entresto, carvedilol. Resume Jardiance upon DC. Out-patient cardiologist Dr. Meza at [facility]     -Held amlodipine for now while patient is being diuresed to allow adequate BP   -Follow ECHO, c/w tele   -cardiology consulted   -Patient w/ CKD stage 3, Cr at baseline.   -Start HSS, hold Tradjenta, Jardiance   -heparin SC    Ms. Hubbard is a 48 y/o female from home w/ PMH of DM, HTN, CHF, CKD presents to the hospital w/ shortness of breath and orthopnea x 1 day. Admitted for AHRF 2/2 CHF exacerbation     A/P:  #Acute hypoxic respiratory failure- 3L NC  #Acute on Chronic Combined systolic and diastolic HF exacerbation vs flash pulm edema from HTN   #NSVT  #HTN cardiomyopathy   #HTN  #DM  #Obesity   #Leukocytosis- possible reactive   #CKD stage 3   #DVT ppx     Plan:  -Pt p/w sob, pro-BNP 7300. Chest x-ray w/ pulm venous congestion.   -s/p IV Lasix in ED after which symptoms improved.  -last ECHO in our EMR from 2022 showed EF 40-45%, grade3 DD and HTN cardiomyopathy.   -Start Lasix 40mg IV BID, monitor daily weights, I&O  -Resume GDMT- Entresto, carvedilol. Resume Jardiance upon DC. Out-patient cardiologist Dr. Meza at [facility]     -Held amlodipine for now while patient is being diuresed to allow adequate BP   -Follow ECHO, c/w tele   -cardiology consulted - Dr. Rossi via TEAMS   -Patient w/ CKD stage 3, Cr at baseline.   -Start HSS, hold Tradjenta, Jardiance   -heparin SC

## 2024-09-15 NOTE — H&P ADULT - HISTORY OF PRESENT ILLNESS
Ms. Hubbard is a 50 y/o female from home w/ PMH of DM, HTN, CHF, CKD presents to the hospital w/ shortness of breath x 1 day. Patient reports that she went to bed yesterday but couldn't fall asleep as she felt very short of breath associated w/ orthopnea. Denies leg swelling, chest pain, abd pain, nausea, vomiting, other symptoms.   Patient reports that she admitted to [facility] [place] last week for similar complaints- she was treated for CHF exacerbation and discharged home w/ Lasix 20mg. She reports being complaint w/ diet and medications.    Per EMS report, patient was hypoxic to 59% on RA, transitioned to Bi-PAP. She received Lasix 40m IV push, follows by nitroglycerin and Duoneb after which her symptoms significantly improved. ICU was consulted by ED, but deemed safe to be admitted to telemetry floor. She was also weaned off of Bi-PAP to 3L NC.   Ms. Hubbard is a 48 y/o female from home w/ PMH of DM, HTN, CHF, CKD presents to the hospital w/ shortness of breath x 1 day. Patient reports that she went to bed yesterday but couldn't fall asleep as she felt very short of breath associated w/ orthopnea. Denies leg swelling, chest pain, abd pain, nausea, vomiting, other symptoms.   Patient reports that she admitted to [facility] last week for similar complaints- she was treated for CHF exacerbation and discharged home w/ Lasix 20mg. She reports being complaint w/ diet and medications.    Per EMS report, patient was hypoxic to 59% on RA, transitioned to Bi-PAP. She received Lasix 40m IV push, follows by nitroglycerin and Duoneb after which her symptoms significantly improved. ICU was consulted by ED, but deemed safe to be admitted to telemetry floor. She was also weaned off of Bi-PAP to 3L NC.      She was also noted to have 10 beats of NSVT on tele around 10:30am, patient remained asymptomatic.

## 2024-09-15 NOTE — ED ADULT TRIAGE NOTE - CHIEF COMPLAINT QUOTE
per EMS PT PW difficulty breathing, found 59 % on room air placed on CPAP sat 91 %. In triage pt restless and moaning. A & O x 3. 20 G placed to R A/C by EMS

## 2024-09-15 NOTE — H&P ADULT - NSHPPHYSICALEXAM_GEN_ALL_CORE
Vital Signs Last 24 Hrs  T(C): 37.1 (15 Sep 2024 07:18), Max: 37.1 (15 Sep 2024 07:18)  T(F): 98.7 (15 Sep 2024 07:18), Max: 98.7 (15 Sep 2024 07:18)  HR: 86 (15 Sep 2024 11:10) (86 - 140)  BP: 161/103 (15 Sep 2024 11:10) (122/87 - 170/128)  RR: 18 (15 Sep 2024 11:10) (17 - 27)  SpO2: 98% (15 Sep 2024 11:10) (91% - 100%)    Parameters below as of 15 Sep 2024 11:10  Patient On (Oxygen Delivery Method): nasal cannula  O2 Flow (L/min): 4    CONSTITUTIONAL: young obese female, NAD   ENMT: Airway patent, Nasal mucosa clear. Mouth with normal mucosa. Throat has no vesicles, no oropharyngeal exudates and uvula is midline.  EYES: Clear bilaterally, pupils equal, round and reactive to light. EOMI.  CARDIAC: Normal rate, regular rhythm.  Heart sounds S1, S2.  No murmurs, rubs or gallops   RESPIRATORY: b/l wheezing and crackles at bases   MUSCULOSKELETAL: Spine appears normal, range of motion is not limited, no muscle or joint tenderness  EXTREMITIES: No edema, cyanosis or deformity   NEUROLOGICAL: Alert and oriented, no focal deficits, no motor or sensory deficits.  SKIN: No rash, skin turgor

## 2024-09-15 NOTE — CONSULT NOTE ADULT - SUBJECTIVE AND OBJECTIVE BOX
CHIEF COMPLAINT: sob    HPI:  50 y/o F with PMH HTN, DM, CHF presenting to the ED w/ difficulty breathing. Per EMS, patient was found to be hypoxic to 59% on RA and placed on CPAP. She was recently admitted for CHF exacerbation. In the ED pt found to be tachypnic and hypoxemic and placed on NIV. CXR with evidence of pulm edema. PT given dose of lasix and SL nitro. ICU consulted for further eval.    Subjective: Pt seen at bedside in the ED. Pt transitioned off NIV and breathing comfortably on 3LNC satting 95% in NAD.   She denies any current CP, palps, SOB but has a persistent cough. She said she follows at [facility] for cardiology and follows closely with her doc. Her entresto dose was recently increased and they reduced her lasix dose 2/2 rising crn levels. She wasw just at [facility] 2 days ago for ADHF. She also just stopped smoking 7 days ago but not diagnosed with asthma/copd     PAST MEDICAL & SURGICAL HISTORY:  Hypertension      Diabetes      Peptic ulcer disease          FAMILY HISTORY:      SOCIAL HISTORY:  Smoking: yes   EtOH Use: denies   Recent Travel: denies     Allergies    lisinopril (Other)    Intolerances        HOME MEDICATIONS:    REVIEW OF SYSTEMS:    [x ] All other systems negative  [ ] Unable to assess ROS because ________    OBJECTIVE:  ICU Vital Signs Last 24 Hrs  T(C): 37.1 (15 Sep 2024 07:18), Max: 37.1 (15 Sep 2024 07:18)  T(F): 98.7 (15 Sep 2024 07:18), Max: 98.7 (15 Sep 2024 07:18)  HR: 86 (15 Sep 2024 11:10) (86 - 140)  BP: 161/103 (15 Sep 2024 11:10) (122/87 - 170/128)  BP(mean): --  ABP: --  ABP(mean): --  RR: 18 (15 Sep 2024 11:10) (17 - 27)  SpO2: 98% (15 Sep 2024 11:10) (91% - 100%)    O2 Parameters below as of 15 Sep 2024 11:10  Patient On (Oxygen Delivery Method): nasal cannula  O2 Flow (L/min): 4            CAPILLARY BLOOD GLUCOSE      POCT Blood Glucose.: 405 mg/dL (15 Sep 2024 05:31)      PHYSICAL EXAM:  GENERAL: sitting up in bed in NAD off niv  EYES: EOMI, PERRL  NECK: Supple, trachea midline, + JVD  HEART: Regular rate and rhythm  LUNGS: Unlabored respirations.  + bilat wheezing   ABDOMEN: Soft, nontender, nondistended, +BS  EXTREMITIES: warm with trace bilat LE edema   NERVOUS SYSTEM:  A&Ox3, moving all extremities, no focal deficits         HOSPITAL MEDICATIONS:  MEDICATIONS  (STANDING):  aspirin enteric coated 81 milliGRAM(s) Oral daily  carvedilol 25 milliGRAM(s) Oral every 12 hours  furosemide   Injectable 40 milliGRAM(s) IV Push every 12 hours  rosuvastatin 20 milliGRAM(s) Oral at bedtime  sacubitril 49 mG/valsartan 51 mG 1 Tablet(s) Oral two times a day    MEDICATIONS  (PRN):  acetaminophen     Tablet .. 650 milliGRAM(s) Oral every 6 hours PRN Temp greater or equal to 38C (100.4F), Mild Pain (1 - 3)  aluminum hydroxide/magnesium hydroxide/simethicone Suspension 30 milliLiter(s) Oral every 4 hours PRN Dyspepsia  melatonin 3 milliGRAM(s) Oral at bedtime PRN Insomnia  ondansetron Injectable 4 milliGRAM(s) IV Push every 8 hours PRN Nausea and/or Vomiting      LABS:                        13.0   15.16 )-----------( 203      ( 15 Sep 2024 05:40 )             38.0     09-15    137  |  103  |  17  ----------------------------<  401<H>  3.6   |  29  |  1.91<H>    Ca    8.8      15 Sep 2024 05:40  Mg     1.9     09-15    TPro  8.0  /  Alb  3.3  /  TBili  0.4  /  DBili  x   /  AST  16  /  ALT  25  /  AlkPhos  153<H>  09-15      Urinalysis Basic - ( 15 Sep 2024 05:40 )    Color: x / Appearance: x / SG: x / pH: x  Gluc: 401 mg/dL / Ketone: x  / Bili: x / Urobili: x   Blood: x / Protein: x / Nitrite: x   Leuk Esterase: x / RBC: x / WBC x   Sq Epi: x / Non Sq Epi: x / Bacteria: x      Arterial Blood Gas:  09-15 @ 08:37  7.36/48/78/27/97.5/1.0  ABG lactate: --    Venous Blood Gas:  09-15 @ 06:00  7.22/72/46/30/69.8  VBG Lactate: 1.50      MICROBIOLOGY: reviewed     RADIOLOGY:  [x ] Reviewed and interpreted by me    EKG: no sig ST changes

## 2024-09-15 NOTE — ED ADULT NURSE NOTE - OBJECTIVE STATEMENT
Pt BIBEMS, AOx4, presents to ED w/ difficulty breathing. Pt presents to ED tachypneic and labored breathing, tachycardic, satting 91% on CPAP placed by EMS, pt brought to Dr. Elana Gonzalez and respiratory therapist at bedside. Pt placed on Bi-pap, currently satting 100%. Pt states she was trying to go to sleep this AM around 0430, but could not sleep d/t difficulty breathing and called EMS. As per pt, PMH HTN, DM. Pt BIBEMS, AOx4, presents to ED w/ difficulty breathing. Pt presents to ED tachypneic and labored breathing, tachycardic, satting 91% on CPAP placed by EMS, pt brought to Dr. Elana Gonzalez and respiratory therapist at bedside. Pt placed on Bi-pap by RT, currently satting 100%. Pt states she was trying to go to sleep this AM around 0430, but could not sleep d/t difficulty breathing and called EMS. As per pt, PMH HTN, DM, CHF.

## 2024-09-15 NOTE — ED PROVIDER NOTE - PHYSICAL EXAMINATION
GENERAL: Awake, alert, +respiratory distress  HEENT: NC/AT, moist mucous membranes  LUNGS: crackles bilateral bases, +tachypneic   CARDIAC: tachycardic regular rhythm, no m/r/g  ABDOMEN: Soft, non tender, non distended, no rebound, no guarding  EXT: 1+ edema, no calf tenderness, no deformities.  NEURO: A&Ox3. Moving all extremities.  SKIN: Warm and dry. No rash.  PSYCH: Normal affect.

## 2024-09-15 NOTE — ED ADULT NURSE NOTE - ED STAT RN HANDOFF DETAILS
Report given dayshift AARTI Wilson. Report endorsed to oncoming RN. Safety checks compld this shift/Safety rounds completed hourly.  IV site checked Q2+remains WDL. Meds given as ord with no s/s of adverse RXNs. Fall +skin precs in place. Any issues endorsed to oncoming RN for follow up.

## 2024-09-15 NOTE — ED ADULT NURSE NOTE - NSFALLRISKINTERV_ED_ALL_ED

## 2024-09-15 NOTE — ED PROVIDER NOTE - OBJECTIVE STATEMENT
48 y/o F with PMH HTN, DM, CHF presenting to the ED w/ difficulty breathing. Per EMS, patient was found to be hypoxic to 59% on RA and placed on CPAP. She was recently admitted for CHF exacerbation. Pt states she was trying to go to sleep this AM around 0430, but could not sleep d/t difficulty breathing and called EMS.    No fever, chills, N/V.

## 2024-09-15 NOTE — CONSULT NOTE ADULT - CRITICAL CARE ATTENDING COMMENT
49-year-old female with severe diastolic congestive heart failure, hypertension, type 2 diabetes presented to the ED with difficulty breathing found to be in acute decompensated heart failure with pulmonary edema and acute hypoxic respiratory failure requiring noninvasive ventilation.  ICU was consulted for further evaluation in the setting of new onset use of noninvasive ventilation as well as acute hypoxic respiratory failure.  Patient now improved following a one-time dose of Lasix 40 mg as well as noninvasive bilevel ventilation to help with her respiratory status.  Upon examination patient was weaned off of noninvasive ventilation and placed on 2 L nasal cannula without issue.    Plan  – Continue with aggressive diuresis with recommend starting 60 mg of IV Lasix twice daily or Bumex 2 mg every 12 hours to continue with aggressive diuresis in the setting of acute decompensated congestive heart failure  – Withhold patient's Entresto at this time given elevated creatinine.  Patient states her baseline creatinine is around 1.6  – Daily weights and strict AIDAN's  – Continue to wean off bilevel ventilation as tolerated and consider continued use during the night while sleeping  – Can continue with DuoNebs every 6 hours given patient has a history of smoking up until a few weeks ago and several pack years.  – Cardiology consult  – Discussed with the patient for possible evaluation of her CardioMEMS on discharge with her primary heart failure doctor at [place] as a way to prevent further readmissions for CHF exacerbations.  –Check RVP given report of sick contact at home    Patient does not require the medical ICU at this time.  Can be admitted to the general medicine floors.  Please call back with any questions

## 2024-09-15 NOTE — ED PROVIDER NOTE - PROGRESS NOTE DETAILS
Hospitalist requesting ICU consult. Will attempt to wean FIO2 to 50%. ICU to see. Pt signed out to me from Dr carter, pt presented with sob and hypoxia as well as hypertensive, placed on bipap, given lasix and nitro with improvement, pt was tba however, hospitalist requested ICU evaluation ICU called, delayed in coming to evaluate the patient, currently have critical patients at this time, pt is stable, updated on the plan, pt is able to speak while on bipap, repeat abg ordered, trop elevated, aspirin given per nurse pt did urinate, pt to be admitted pt evaluated by ICU, pt was given Lasix but no urine output, recommend more Lasix 40mg, albuterol for wheezing and bladder scan

## 2024-09-15 NOTE — H&P ADULT - NSICDXPASTMEDICALHX_GEN_ALL_CORE_FT
PAST MEDICAL HISTORY:  Chronic systolic congestive heart failure     Diabetes     Hypertension     Peptic ulcer disease

## 2024-09-15 NOTE — ED ADULT NURSE REASSESSMENT NOTE - NS ED NURSE REASSESS COMMENT FT1
Pt AOx4, remained on cardiac and spo2 monitor. Pt remained on BiPap, pt currently appears to be tolerating Bipap well. Pt states "I am starting to feel better". Pt states she is extremely claustrophobic, pt states she does not want to receive CT scan of the chest. Dr. Huitron informed. Plan of care ongoing.

## 2024-09-15 NOTE — ED PROVIDER NOTE - CLINICAL SUMMARY MEDICAL DECISION MAKING FREE TEXT BOX
48 y/o F with PMH HTN, DM, CHF presenting to the ED w/ difficulty breathing.  In the ED, she is hypoxic, tachypneic. Placed on BIPAP.  BP is elevated, sublingual NTG dosed.  Concern for CHF exacerbation. Rales on exam. Will dose lasix.  Plan for labs/CXR  Will need admission +/- ICU evaluation

## 2024-09-16 LAB
ALBUMIN SERPL ELPH-MCNC: 2.9 G/DL — LOW (ref 3.3–5)
ALP SERPL-CCNC: 126 U/L — HIGH (ref 40–120)
ALT FLD-CCNC: 22 U/L — SIGNIFICANT CHANGE UP (ref 12–78)
ANION GAP SERPL CALC-SCNC: 6 MMOL/L — SIGNIFICANT CHANGE UP (ref 5–17)
ANION GAP SERPL CALC-SCNC: 7 MMOL/L — SIGNIFICANT CHANGE UP (ref 5–17)
AST SERPL-CCNC: 13 U/L — LOW (ref 15–37)
BASOPHILS # BLD AUTO: 0.02 K/UL — SIGNIFICANT CHANGE UP (ref 0–0.2)
BASOPHILS NFR BLD AUTO: 0.2 % — SIGNIFICANT CHANGE UP (ref 0–2)
BILIRUB SERPL-MCNC: 0.6 MG/DL — SIGNIFICANT CHANGE UP (ref 0.2–1.2)
BUN SERPL-MCNC: 19 MG/DL — SIGNIFICANT CHANGE UP (ref 7–23)
BUN SERPL-MCNC: 19 MG/DL — SIGNIFICANT CHANGE UP (ref 7–23)
CALCIUM SERPL-MCNC: 8.3 MG/DL — LOW (ref 8.5–10.1)
CALCIUM SERPL-MCNC: 8.9 MG/DL — SIGNIFICANT CHANGE UP (ref 8.5–10.1)
CHLORIDE SERPL-SCNC: 106 MMOL/L — SIGNIFICANT CHANGE UP (ref 96–108)
CHLORIDE SERPL-SCNC: 107 MMOL/L — SIGNIFICANT CHANGE UP (ref 96–108)
CO2 SERPL-SCNC: 26 MMOL/L — SIGNIFICANT CHANGE UP (ref 22–31)
CO2 SERPL-SCNC: 28 MMOL/L — SIGNIFICANT CHANGE UP (ref 22–31)
CREAT SERPL-MCNC: 1.58 MG/DL — HIGH (ref 0.5–1.3)
CREAT SERPL-MCNC: 1.62 MG/DL — HIGH (ref 0.5–1.3)
EGFR: 39 ML/MIN/1.73M2 — LOW
EGFR: 40 ML/MIN/1.73M2 — LOW
EOSINOPHIL # BLD AUTO: 0.11 K/UL — SIGNIFICANT CHANGE UP (ref 0–0.5)
EOSINOPHIL NFR BLD AUTO: 1.1 % — SIGNIFICANT CHANGE UP (ref 0–6)
GLUCOSE BLDC GLUCOMTR-MCNC: 246 MG/DL — HIGH (ref 70–99)
GLUCOSE BLDC GLUCOMTR-MCNC: 249 MG/DL — HIGH (ref 70–99)
GLUCOSE SERPL-MCNC: 213 MG/DL — HIGH (ref 70–99)
GLUCOSE SERPL-MCNC: 221 MG/DL — HIGH (ref 70–99)
HCT VFR BLD CALC: 33.5 % — LOW (ref 34.5–45)
HGB BLD-MCNC: 11.6 G/DL — SIGNIFICANT CHANGE UP (ref 11.5–15.5)
IMM GRANULOCYTES NFR BLD AUTO: 0.3 % — SIGNIFICANT CHANGE UP (ref 0–0.9)
LYMPHOCYTES # BLD AUTO: 3.21 K/UL — SIGNIFICANT CHANGE UP (ref 1–3.3)
LYMPHOCYTES # BLD AUTO: 31.7 % — SIGNIFICANT CHANGE UP (ref 13–44)
MAGNESIUM SERPL-MCNC: 1.7 MG/DL — SIGNIFICANT CHANGE UP (ref 1.6–2.6)
MANUAL SMEAR VERIFICATION: SIGNIFICANT CHANGE UP
MCHC RBC-ENTMCNC: 25.8 PG — LOW (ref 27–34)
MCHC RBC-ENTMCNC: 34.6 G/DL — SIGNIFICANT CHANGE UP (ref 32–36)
MCV RBC AUTO: 74.6 FL — LOW (ref 80–100)
MONOCYTES # BLD AUTO: 0.47 K/UL — SIGNIFICANT CHANGE UP (ref 0–0.9)
MONOCYTES NFR BLD AUTO: 4.6 % — SIGNIFICANT CHANGE UP (ref 2–14)
NEUTROPHILS # BLD AUTO: 6.28 K/UL — SIGNIFICANT CHANGE UP (ref 1.8–7.4)
NEUTROPHILS NFR BLD AUTO: 62.1 % — SIGNIFICANT CHANGE UP (ref 43–77)
NRBC # BLD: 0 /100 WBCS — SIGNIFICANT CHANGE UP (ref 0–0)
PHOSPHATE SERPL-MCNC: 2.2 MG/DL — LOW (ref 2.5–4.5)
PLAT MORPH BLD: NORMAL — SIGNIFICANT CHANGE UP
PLATELET # BLD AUTO: 177 K/UL — SIGNIFICANT CHANGE UP (ref 150–400)
POTASSIUM SERPL-MCNC: 3.1 MMOL/L — LOW (ref 3.5–5.3)
POTASSIUM SERPL-MCNC: 3.8 MMOL/L — SIGNIFICANT CHANGE UP (ref 3.5–5.3)
POTASSIUM SERPL-SCNC: 3.1 MMOL/L — LOW (ref 3.5–5.3)
POTASSIUM SERPL-SCNC: 3.8 MMOL/L — SIGNIFICANT CHANGE UP (ref 3.5–5.3)
PROT SERPL-MCNC: 7.1 GM/DL — SIGNIFICANT CHANGE UP (ref 6–8.3)
RBC # BLD: 4.49 M/UL — SIGNIFICANT CHANGE UP (ref 3.8–5.2)
RBC # FLD: 15.6 % — HIGH (ref 10.3–14.5)
RBC BLD AUTO: NORMAL — SIGNIFICANT CHANGE UP
SODIUM SERPL-SCNC: 140 MMOL/L — SIGNIFICANT CHANGE UP (ref 135–145)
SODIUM SERPL-SCNC: 140 MMOL/L — SIGNIFICANT CHANGE UP (ref 135–145)
WBC # BLD: 10.12 K/UL — SIGNIFICANT CHANGE UP (ref 3.8–10.5)
WBC # FLD AUTO: 10.12 K/UL — SIGNIFICANT CHANGE UP (ref 3.8–10.5)

## 2024-09-16 PROCEDURE — 93306 TTE W/DOPPLER COMPLETE: CPT | Mod: 26

## 2024-09-16 PROCEDURE — 99223 1ST HOSP IP/OBS HIGH 75: CPT | Mod: 25

## 2024-09-16 PROCEDURE — 93356 MYOCRD STRAIN IMG SPCKL TRCK: CPT

## 2024-09-16 PROCEDURE — 99232 SBSQ HOSP IP/OBS MODERATE 35: CPT

## 2024-09-16 RX ORDER — POTASSIUM PHOSPHATE 236; 224 MG/ML; MG/ML
15 INJECTION, SOLUTION INTRAVENOUS ONCE
Refills: 0 | Status: COMPLETED | OUTPATIENT
Start: 2024-09-16 | End: 2024-09-16

## 2024-09-16 RX ORDER — INSULIN GLARGINE 100 [IU]/ML
10 INJECTION, SOLUTION SUBCUTANEOUS AT BEDTIME
Refills: 0 | Status: DISCONTINUED | OUTPATIENT
Start: 2024-09-16 | End: 2024-09-19

## 2024-09-16 RX ORDER — AMLODIPINE BESYLATE 10 MG/1
10 TABLET ORAL DAILY
Refills: 0 | Status: DISCONTINUED | OUTPATIENT
Start: 2024-09-16 | End: 2024-09-19

## 2024-09-16 RX ORDER — POTASSIUM CHLORIDE 10 MEQ
40 TABLET, EXT RELEASE, PARTICLES/CRYSTALS ORAL EVERY 4 HOURS
Refills: 0 | Status: COMPLETED | OUTPATIENT
Start: 2024-09-16 | End: 2024-09-16

## 2024-09-16 RX ADMIN — CARVEDILOL 25 MILLIGRAM(S): 6.25 TABLET ORAL at 06:45

## 2024-09-16 RX ADMIN — Medication 40 MILLIEQUIVALENT(S): at 06:46

## 2024-09-16 RX ADMIN — Medication 5000 UNIT(S): at 06:45

## 2024-09-16 RX ADMIN — IPRATROPIUM BROMIDE AND ALBUTEROL SULFATE 3 MILLILITER(S): .5; 3 SOLUTION RESPIRATORY (INHALATION) at 06:22

## 2024-09-16 RX ADMIN — Medication 40 MILLIGRAM(S): at 02:14

## 2024-09-16 RX ADMIN — SACUBITRIL AND VALSARTAN 1 TABLET(S): 49; 51 TABLET, FILM COATED ORAL at 17:20

## 2024-09-16 RX ADMIN — CARVEDILOL 25 MILLIGRAM(S): 6.25 TABLET ORAL at 17:23

## 2024-09-16 RX ADMIN — Medication 40 MILLIEQUIVALENT(S): at 01:51

## 2024-09-16 RX ADMIN — POTASSIUM PHOSPHATE 62.5 MILLIMOLE(S): 236; 224 INJECTION, SOLUTION INTRAVENOUS at 01:53

## 2024-09-16 RX ADMIN — AMLODIPINE BESYLATE 10 MILLIGRAM(S): 10 TABLET ORAL at 17:24

## 2024-09-16 RX ADMIN — SACUBITRIL AND VALSARTAN 1 TABLET(S): 49; 51 TABLET, FILM COATED ORAL at 06:46

## 2024-09-16 NOTE — PATIENT PROFILE ADULT - FALL HARM RISK - HARM RISK INTERVENTIONS

## 2024-09-16 NOTE — PROGRESS NOTE ADULT - SUBJECTIVE AND OBJECTIVE BOX
Patient is a 49y old  Female who presents with a chief complaint of CHF exacerbation (16 Sep 2024 13:35)    INTERVAL HPI/OVERNIGHT EVENTS: No acute events overnight. HD stable.      MEDICATIONS  (STANDING):  aspirin enteric coated 81 milliGRAM(s) Oral daily  carvedilol 25 milliGRAM(s) Oral every 12 hours  furosemide   Injectable 40 milliGRAM(s) IV Push every 12 hours  heparin   Injectable 5000 Unit(s) SubCutaneous every 8 hours  insulin glargine Injectable (LANTUS) 10 Unit(s) SubCutaneous at bedtime  insulin lispro (ADMELOG) corrective regimen sliding scale   SubCutaneous at bedtime  insulin lispro (ADMELOG) corrective regimen sliding scale   SubCutaneous three times a day before meals  insulin lispro Injectable (ADMELOG) 3 Unit(s) SubCutaneous three times a day before meals  rosuvastatin 20 milliGRAM(s) Oral at bedtime  sacubitril 49 mG/valsartan 51 mG 1 Tablet(s) Oral two times a day    MEDICATIONS  (PRN):  acetaminophen     Tablet .. 650 milliGRAM(s) Oral every 6 hours PRN Temp greater or equal to 38C (100.4F), Mild Pain (1 - 3)  albuterol    90 MICROgram(s) HFA Inhaler 1 Puff(s) Inhalation every 6 hours PRN Shortness of Breath and/or Wheezing  albuterol/ipratropium for Nebulization 3 milliLiter(s) Nebulizer every 6 hours PRN Shortness of Breath and/or Wheezing  aluminum hydroxide/magnesium hydroxide/simethicone Suspension 30 milliLiter(s) Oral every 4 hours PRN Dyspepsia  melatonin 3 milliGRAM(s) Oral at bedtime PRN Insomnia  ondansetron Injectable 4 milliGRAM(s) IV Push every 8 hours PRN Nausea and/or Vomiting      Allergies    lisinopril (Other)    Intolerances        REVIEW OF SYSTEMS: all negative with exception of above    Vital Signs Last 24 Hrs  T(C): 37.6 (16 Sep 2024 10:04), Max: 37.6 (16 Sep 2024 10:04)  T(F): 99.7 (16 Sep 2024 10:04), Max: 99.7 (16 Sep 2024 10:04)  HR: 84 (16 Sep 2024 10:04) (78 - 97)  BP: 136/94 (16 Sep 2024 10:04) (135/91 - 166/90)  BP(mean): --  RR: 18 (16 Sep 2024 10:04) (18 - 22)  SpO2: 96% (16 Sep 2024 10:04) (95% - 100%)    Parameters below as of 16 Sep 2024 07:03  Patient On (Oxygen Delivery Method): nasal cannula  O2 Flow (L/min): 4      PHYSICAL EXAM:  GENERAL: NAD, well-groomed  NERVOUS SYSTEM:  Alert & Oriented X3, Good concentration; Motor Strength 5/5 B/L upper and lower extremities; DTRs 2+ intact and symmetric  CHEST/LUNG: Clear to percussion bilaterally; No rales, rhonchi, wheezing, or rubs  HEART: Regular rate and rhythm; No murmurs, rubs, or gallops  ABDOMEN: Soft, Nontender, Nondistended; Bowel sounds present  EXTREMITIES:  2+ Peripheral Pulses, No clubbing, cyanosis, or edema    LABS:                        11.6   10.12 )-----------( 177      ( 16 Sep 2024 07:10 )             33.5     09-16    140  |  107  |  19  ----------------------------<  221<H>  3.8   |  26  |  1.62<H>    Ca    8.9      16 Sep 2024 07:10  Phos  2.2     09-15  Mg     1.7     09-15    TPro  7.1  /  Alb  2.9<L>  /  TBili  0.6  /  DBili  x   /  AST  13<L>  /  ALT  22  /  AlkPhos  126<H>  09-16      Urinalysis Basic - ( 16 Sep 2024 07:10 )    Color: x / Appearance: x / SG: x / pH: x  Gluc: 221 mg/dL / Ketone: x  / Bili: x / Urobili: x   Blood: x / Protein: x / Nitrite: x   Leuk Esterase: x / RBC: x / WBC x   Sq Epi: x / Non Sq Epi: x / Bacteria: x      CAPILLARY BLOOD GLUCOSE      POCT Blood Glucose.: 247 mg/dL (15 Sep 2024 21:08)      RADIOLOGY & ADDITIONAL TESTS:    Imaging Personally Reviewed:  [ ] YES  [ ] NO    Consultant(s) Notes Reviewed:  [ ] YES  [ ] NO    Care Discussed with Consultants/Other Providers [ ] YES  [ ] NO

## 2024-09-16 NOTE — CONSULT NOTE ADULT - ASSESSMENT
48 y/o F with PMH HTN, DM, CHF presenting to the ED w/ difficulty breathing, recently admitted for ADHF now presenting to the ED with pulm edema requiring NIV c/f ADHF. ICU consulted for further eval    recs:  - Resp failure likely from ADHF requiring NIV  - Pt transitioned off NIV after diuresis and breathing comfortably on NC  - cxr with significant pulm edema. cont with diuresis and recommend cardiology eval as this pt has had multiple readmissions for ADHF  - no hx of asthma/cOPD but acitvley wheezing (but does smoke). may be wheezing from pulm edema. recommend standing duonebs while activley wheezing   - Pt does not require ICU level of care. Please reconsult with any questions     d/w dr myers and ED attending 
48yo lady with a PMH of chronic systolic CHF w/ a known EF of 40-45%, HTN, DM, CKD; presents to the hospital w/ shortness of breath x 1 day. Patient reports that she went to bed yesterday but couldn't fall asleep as she felt very short of breath associated w/ orthopnea. Denies leg swelling, chest pain, abd pain, nausea, vomiting, other symptoms.   Patient reports that [place] [facility] [place]- she was treated for CHF exacerbation and discharged home w/ Lasix 20mg. She reports being complaint w/ diet and medications.    Per EMS report, patient was hypoxic to 59% on RA, transitioned to Bi-PAP. She received Lasix 40m IV push, follows by nitroglycerin and Duoneb after which her symptoms significantly improved. ICU was consulted by ED, but deemed safe to be admitted to telemetry floor. She was also weaned off of Bi-PAP to 3L NC.    She was also noted to have 10 beats of NSVT on tele around 10:30am yesterday, patient remained asymptomatic.  Per pt, had normal cardiac cath at [facility] 2-3years ago; pending nuc stress test on the 25th w/ outpt cardio.  Echo today w/ biventricular thickening... concerning for infiltrative process.    -monitor on tele  -cont diuresis w/ IV lasix... adherence to meds strongly encouarged  -replete lytes  -monitor creat w/ diuresis  -cardiac MRI offered to pt:   Echo today w/ biventricular thickening... concerning for infiltrative process.  She adamantly refuses 2/2 clausterphobia, even open MRI  -ischemic eval as outpt as scheduled when transitioned to oral lasix

## 2024-09-16 NOTE — PROGRESS NOTE ADULT - ASSESSMENT
Ms. Hubbard is a 48 y/o female from home w/ PMH of DM, HTN, CHF, CKD presents to the hospital w/ shortness of breath and orthopnea x 1 day. Admitted for AHRF 2/2 CHF exacerbation     A/P:  #Acute hypoxic respiratory failure- 3L NC  #Acute on Chronic Combined systolic and diastolic HF exacerbation vs flash pulm edema from HTN   #NSVT  #HTN cardiomyopathy   #HTN  #DM  #Obesity   #Leukocytosis- possible reactive   #CKD stage 3   #DVT ppx     Plan:  -Pt p/w sob, pro-BNP 7300. Chest x-ray w/ pulm venous congestion.   -s/p IV Lasix in ED after which symptoms improved.  -last ECHO in our EMR from 2022 showed EF 40-45%, grade3 DD and HTN cardiomyopathy.   - Start Lasix 40mg IV BID, monitor daily weights, I&O  - Will resume amlodipine 10 mg QD  - Cardiology c/s placed  -Resume GDMT- Entresto, carvedilol. Resume Jardiance upon DC. Out-patient cardiologist Dr. Meza at [facility]     -Held amlodipine for now while patient is being diuresed to allow adequate BP   -Follow ECHO, c/w tele   -cardiology consulted - Dr. Rossi via TEAMS   -Patient w/ CKD stage 3, Cr at baseline.   -Start HSS, hold Tradjenta, Jardiance   -heparin SC

## 2024-09-16 NOTE — PATIENT PROFILE ADULT - STATED REASON FOR ADMISSION
Difficulty breathing found to be in acute decompensated heart failure with pulmonary edema and acute hypoxic respiratory failure.

## 2024-09-16 NOTE — CONSULT NOTE ADULT - SUBJECTIVE AND OBJECTIVE BOX
CARDIOLOGY CONSULT NOTE    Patient is a 49y Female with a known history of :    HPI:  48yo lady with a PMH of chronic systolic CHF w/ a known EF of 40-45%, HTN, DM, CKD; presents to the hospital w/ shortness of breath x 1 day. Patient reports that she went to bed yesterday but couldn't fall asleep as she felt very short of breath associated w/ orthopnea. Denies leg swelling, chest pain, abd pain, nausea, vomiting, other symptoms.   Patient reports that she admitted to [facility] last week for similar complaints- she was treated for CHF exacerbation and discharged home w/ Lasix 20mg. She reports being complaint w/ diet and medications.    Per EMS report, patient was hypoxic to 59% on RA, transitioned to Bi-PAP. She received Lasix 40m IV push, follows by nitroglycerin and Duoneb after which her symptoms significantly improved. ICU was consulted by ED, but deemed safe to be admitted to telemetry floor. She was also weaned off of Bi-PAP to 3L NC.    She was also noted to have 10 beats of NSVT on tele around 10:30am yesterday, patient remained asymptomatic.      REVIEW OF SYSTEMS:  CONSTITUTIONAL: No fever, weight loss, or fatigue  EYES: No eye pain, visual disturbances, or discharge  ENMT:  No difficulty hearing, tinnitus, vertigo; No sinus or throat pain  NECK: No pain or stiffness  BREASTS: No pain, masses, or nipple discharge  RESPIRATORY: No cough, wheezing, chills or hemoptysis; No shortness of breath  CARDIOVASCULAR: No chest pain, palpitations, dizziness, or leg swelling  GASTROINTESTINAL: No abdominal or epigastric pain. No nausea, vomiting, or hematemesis; No diarrhea or constipation. No melena or hematochezia.  GENITOURINARY: No dysuria, frequency, hematuria, or incontinence  NEUROLOGICAL: No headaches, memory loss, loss of strength, numbness, or tremors  SKIN: No itching, burning, rashes, or lesions   LYMPH NODES: No enlarged glands  ENDOCRINE: No heat or cold intolerance; No hair loss  MUSCULOSKELETAL: No joint pain or swelling; No muscle, back, or extremity pain  PSYCHIATRIC: No depression, anxiety, mood swings, or difficulty sleeping  HEME/LYMPH: No easy bruising, or bleeding gums  ALLERGY AND IMMUNOLOGIC: No hives or eczema    MEDICATIONS  (STANDING):  aspirin enteric coated 81 milliGRAM(s) Oral daily  carvedilol 25 milliGRAM(s) Oral every 12 hours  furosemide   Injectable 40 milliGRAM(s) IV Push every 12 hours  heparin   Injectable 5000 Unit(s) SubCutaneous every 8 hours  rosuvastatin 20 milliGRAM(s) Oral at bedtime  sacubitril 49 mG/valsartan 51 mG 1 Tablet(s) Oral two times a day    MEDICATIONS  (PRN):  acetaminophen     Tablet .. 650 milliGRAM(s) Oral every 6 hours PRN Temp greater or equal to 38C (100.4F), Mild Pain (1 - 3)  albuterol    90 MICROgram(s) HFA Inhaler 1 Puff(s) Inhalation every 6 hours PRN Shortness of Breath and/or Wheezing  albuterol/ipratropium for Nebulization 3 milliLiter(s) Nebulizer every 6 hours PRN Shortness of Breath and/or Wheezing  aluminum hydroxide/magnesium hydroxide/simethicone Suspension 30 milliLiter(s) Oral every 4 hours PRN Dyspepsia  melatonin 3 milliGRAM(s) Oral at bedtime PRN Insomnia  ondansetron Injectable 4 milliGRAM(s) IV Push every 8 hours PRN Nausea and/or Vomiting      ALLERGIES: lisinopril (Other)      FAMILY HISTORY:  No pertinent family history in first degree relatives        PHYSICAL EXAMINATION:  -----------------------------  T(C): 37.6 (09-16-24 @ 10:04), Max: 37.6 (09-16-24 @ 10:04)  HR: 84 (09-16-24 @ 10:04) (78 - 97)  BP: 136/94 (09-16-24 @ 10:04) (135/91 - 166/90)  RR: 18 (09-16-24 @ 10:04) (18 - 22)  SpO2: 96% (09-16-24 @ 10:04) (95% - 100%)    Constitutional: well developed, normal appearance, well groomed, well nourished, no deformities and no acute distress.   Eyes: the conjunctiva exhibited no abnormalities and the eyelids demonstrated no xanthelasmas.   HEENT: normal oral mucosa, no oral pallor and no oral cyanosis.   Neck: normal jugular venous A waves present, normal jugular venous V waves present and no jugular venous marcos A waves.   Pulmonary: no respiratory distress, normal respiratory rhythm and effort, no accessory muscle use and lungs were clear to auscultation bilaterally.   Cardiovascular: heart rate and rhythm were normal, normal S1 and S2 and no murmur, gallop, rub, heave or thrill are present.   Abdomen: soft, non-tender, no hepato-splenomegaly and no abdominal mass palpated.   Musculoskeletal: the gait could not be assessed..   Extremities: no clubbing of the fingernails, no localized cyanosis, no petechial hemorrhages and no ischemic changes.   Skin: normal skin color and pigmentation, no rash, no venous stasis, no skin lesions, no skin ulcer and no xanthoma was observed.   Psychiatric: oriented to person, place, and time, the affect was normal, the mood was normal and not feeling anxious.       LABS:   --------  09-16    140  |  107  |  19  ----------------------------<  221<H>  3.8   |  26  |  1.62<H>    Ca    8.9      16 Sep 2024 07:10  Phos  2.2     09-15  Mg     1.7     09-15    TPro  7.1  /  Alb  2.9<L>  /  TBili  0.6  /  DBili  x   /  AST  13<L>  /  ALT  22  /  AlkPhos  126<H>  09-16                         11.6   10.12 )-----------( 177      ( 16 Sep 2024 07:10 )             33.5                 RADIOLOGY:  -----------------        ECG:  CARDIOLOGY CONSULT NOTE    Patient is a 49y Female with a known history of :    HPI:  50yo lady with a PMH of chronic systolic CHF w/ a known EF of 40-45%, HTN, DM, CKD; presents to the hospital w/ shortness of breath x 1 day. Patient reports that she went to bed yesterday but couldn't fall asleep as she felt very short of breath associated w/ orthopnea. Denies leg swelling, chest pain, abd pain, nausea, vomiting, other symptoms.   Patient reports that she admitted to [facility] last week for similar complaints- she was treated for CHF exacerbation and discharged home w/ Lasix 20mg. She reports being complaint w/ diet and medications.    Per EMS report, patient was hypoxic to 59% on RA, transitioned to Bi-PAP. She received Lasix 40m IV push, follows by nitroglycerin and Duoneb after which her symptoms significantly improved. ICU was consulted by ED, but deemed safe to be admitted to telemetry floor. She was also weaned off of Bi-PAP to 3L NC.    She was also noted to have 10 beats of NSVT on tele around 10:30am yesterday, patient remained asymptomatic.  Per pt, had normal cardiac cath at [facility] 2-3years ago; pending nuc stress test on the 25th w/ outpt cardio.  Echo today w/ biventricular thickening... concerning for infiltrative process.       REVIEW OF SYSTEMS:  CONSTITUTIONAL: No fever, weight loss, or fatigue  EYES: No eye pain, visual disturbances, or discharge  ENMT:  No difficulty hearing, tinnitus, vertigo; No sinus or throat pain  NECK: No pain or stiffness  BREASTS: No pain, masses, or nipple discharge  RESPIRATORY: No cough, wheezing, chills or hemoptysis; No shortness of breath  CARDIOVASCULAR: No chest pain, palpitations, dizziness, or leg swelling  GASTROINTESTINAL: No abdominal or epigastric pain. No nausea, vomiting, or hematemesis; No diarrhea or constipation. No melena or hematochezia.  GENITOURINARY: No dysuria, frequency, hematuria, or incontinence  NEUROLOGICAL: No headaches, memory loss, loss of strength, numbness, or tremors  SKIN: No itching, burning, rashes, or lesions   LYMPH NODES: No enlarged glands  ENDOCRINE: No heat or cold intolerance; No hair loss  MUSCULOSKELETAL: No joint pain or swelling; No muscle, back, or extremity pain  PSYCHIATRIC: No depression, anxiety, mood swings, or difficulty sleeping  HEME/LYMPH: No easy bruising, or bleeding gums  ALLERGY AND IMMUNOLOGIC: No hives or eczema    MEDICATIONS  (STANDING):  aspirin enteric coated 81 milliGRAM(s) Oral daily  carvedilol 25 milliGRAM(s) Oral every 12 hours  furosemide   Injectable 40 milliGRAM(s) IV Push every 12 hours  heparin   Injectable 5000 Unit(s) SubCutaneous every 8 hours  rosuvastatin 20 milliGRAM(s) Oral at bedtime  sacubitril 49 mG/valsartan 51 mG 1 Tablet(s) Oral two times a day    MEDICATIONS  (PRN):  acetaminophen     Tablet .. 650 milliGRAM(s) Oral every 6 hours PRN Temp greater or equal to 38C (100.4F), Mild Pain (1 - 3)  albuterol    90 MICROgram(s) HFA Inhaler 1 Puff(s) Inhalation every 6 hours PRN Shortness of Breath and/or Wheezing  albuterol/ipratropium for Nebulization 3 milliLiter(s) Nebulizer every 6 hours PRN Shortness of Breath and/or Wheezing  aluminum hydroxide/magnesium hydroxide/simethicone Suspension 30 milliLiter(s) Oral every 4 hours PRN Dyspepsia  melatonin 3 milliGRAM(s) Oral at bedtime PRN Insomnia  ondansetron Injectable 4 milliGRAM(s) IV Push every 8 hours PRN Nausea and/or Vomiting      ALLERGIES: lisinopril (Other)      FAMILY HISTORY:  No pertinent family history in first degree relatives        PHYSICAL EXAMINATION:  -----------------------------  T(C): 37.6 (09-16-24 @ 10:04), Max: 37.6 (09-16-24 @ 10:04)  HR: 84 (09-16-24 @ 10:04) (78 - 97)  BP: 136/94 (09-16-24 @ 10:04) (135/91 - 166/90)  RR: 18 (09-16-24 @ 10:04) (18 - 22)  SpO2: 96% (09-16-24 @ 10:04) (95% - 100%)    Constitutional: well developed, normal appearance, well groomed, well nourished, no deformities and no acute distress.   Eyes: the conjunctiva exhibited no abnormalities and the eyelids demonstrated no xanthelasmas.   HEENT: normal oral mucosa, no oral pallor and no oral cyanosis.   Neck: normal jugular venous A waves present, normal jugular venous V waves present and no jugular venous marcos A waves.   Pulmonary: no respiratory distress, normal respiratory rhythm and effort, no accessory muscle use and lungs were clear to auscultation bilaterally.   Cardiovascular: heart rate and rhythm were normal, normal S1 and S2 and no murmur, gallop, rub, heave or thrill are present.   Abdomen: soft, non-tender, no hepato-splenomegaly and no abdominal mass palpated.   Musculoskeletal: the gait could not be assessed..   Extremities: no clubbing of the fingernails, no localized cyanosis, no petechial hemorrhages and no ischemic changes.   Skin: normal skin color and pigmentation, no rash, no venous stasis, no skin lesions, no skin ulcer and no xanthoma was observed.   Psychiatric: oriented to person, place, and time, the affect was normal, the mood was normal and not feeling anxious.       LABS:   --------  09-16    140  |  107  |  19  ----------------------------<  221<H>  3.8   |  26  |  1.62<H>    Ca    8.9      16 Sep 2024 07:10  Phos  2.2     09-15  Mg     1.7     09-15    TPro  7.1  /  Alb  2.9<L>  /  TBili  0.6  /  DBili  x   /  AST  13<L>  /  ALT  22  /  AlkPhos  126<H>  09-16                         11.6   10.12 )-----------( 177      ( 16 Sep 2024 07:10 )             33.5                 RADIOLOGY:  -----------------    < from: TTE Echo Complete w/o Contrast w/ Doppler (09.16.24 @ 10:17) >  CONCLUSIONS:     1. Left ventricular cavity is mildly dilated. Left ventricular systolic   function is mildly decreased with an ejection fraction visually estimated   at 45 to 50 %.   2. Severe left ventricular hypertrophy.   3. There is severe (grade 3) left ventricular diastolic dysfunction.   4. Normal right ventricular cavity size and mildly increased wall   thickness,.   5. Left atrium is severely dilated.   6. Mildmitral valve leaflet calcification.   7. Mild mitral valve stenosis.   8. Mild to moderate mitral regurgitation.   9. Moderate tricuspid regurgitation.  10. Moderate aortic regurgitation.  11. Moderate pulmonic regurgitation.  12. Small pericardial effusion with no evidence of hemodynamic compromise   (or echocardiographic evidence of cardiac tamponade). There is some RA   collapse, but this lacks specificity and not diagnostic in itself.  13. Estimated pulmonary artery systolic pressure is 44 mmHg.      < end of copied text >

## 2024-09-17 LAB
ALBUMIN SERPL ELPH-MCNC: 2.8 G/DL — LOW (ref 3.3–5)
ALP SERPL-CCNC: 118 U/L — SIGNIFICANT CHANGE UP (ref 40–120)
ALT FLD-CCNC: 17 U/L — SIGNIFICANT CHANGE UP (ref 12–78)
ANION GAP SERPL CALC-SCNC: 5 MMOL/L — SIGNIFICANT CHANGE UP (ref 5–17)
AST SERPL-CCNC: 11 U/L — LOW (ref 15–37)
BILIRUB SERPL-MCNC: 0.6 MG/DL — SIGNIFICANT CHANGE UP (ref 0.2–1.2)
BUN SERPL-MCNC: 28 MG/DL — HIGH (ref 7–23)
CALCIUM SERPL-MCNC: 8.9 MG/DL — SIGNIFICANT CHANGE UP (ref 8.5–10.1)
CHLORIDE SERPL-SCNC: 109 MMOL/L — HIGH (ref 96–108)
CO2 SERPL-SCNC: 26 MMOL/L — SIGNIFICANT CHANGE UP (ref 22–31)
CREAT SERPL-MCNC: 1.74 MG/DL — HIGH (ref 0.5–1.3)
EGFR: 36 ML/MIN/1.73M2 — LOW
GLUCOSE BLDC GLUCOMTR-MCNC: 242 MG/DL — HIGH (ref 70–99)
GLUCOSE BLDC GLUCOMTR-MCNC: 257 MG/DL — HIGH (ref 70–99)
GLUCOSE BLDC GLUCOMTR-MCNC: 304 MG/DL — HIGH (ref 70–99)
GLUCOSE SERPL-MCNC: 233 MG/DL — HIGH (ref 70–99)
HCT VFR BLD CALC: 32.8 % — LOW (ref 34.5–45)
HGB BLD-MCNC: 11 G/DL — LOW (ref 11.5–15.5)
MCHC RBC-ENTMCNC: 25.6 PG — LOW (ref 27–34)
MCHC RBC-ENTMCNC: 33.5 G/DL — SIGNIFICANT CHANGE UP (ref 32–36)
MCV RBC AUTO: 76.3 FL — LOW (ref 80–100)
NRBC # BLD: 0 /100 WBCS — SIGNIFICANT CHANGE UP (ref 0–0)
PLATELET # BLD AUTO: 170 K/UL — SIGNIFICANT CHANGE UP (ref 150–400)
POTASSIUM SERPL-MCNC: 4.1 MMOL/L — SIGNIFICANT CHANGE UP (ref 3.5–5.3)
POTASSIUM SERPL-SCNC: 4.1 MMOL/L — SIGNIFICANT CHANGE UP (ref 3.5–5.3)
PROT SERPL-MCNC: 6.8 GM/DL — SIGNIFICANT CHANGE UP (ref 6–8.3)
RBC # BLD: 4.3 M/UL — SIGNIFICANT CHANGE UP (ref 3.8–5.2)
RBC # FLD: 15.9 % — HIGH (ref 10.3–14.5)
SODIUM SERPL-SCNC: 140 MMOL/L — SIGNIFICANT CHANGE UP (ref 135–145)
WBC # BLD: 10.62 K/UL — HIGH (ref 3.8–10.5)
WBC # FLD AUTO: 10.62 K/UL — HIGH (ref 3.8–10.5)

## 2024-09-17 PROCEDURE — 99232 SBSQ HOSP IP/OBS MODERATE 35: CPT

## 2024-09-17 PROCEDURE — 99233 SBSQ HOSP IP/OBS HIGH 50: CPT

## 2024-09-17 RX ORDER — LINAGLIPTIN 5 MG/1
5 TABLET, FILM COATED ORAL DAILY
Refills: 0 | Status: DISCONTINUED | OUTPATIENT
Start: 2024-09-17 | End: 2024-09-19

## 2024-09-17 RX ORDER — FUROSEMIDE 40 MG
40 TABLET ORAL DAILY
Refills: 0 | Status: DISCONTINUED | OUTPATIENT
Start: 2024-09-17 | End: 2024-09-19

## 2024-09-17 RX ADMIN — LINAGLIPTIN 5 MILLIGRAM(S): 5 TABLET, FILM COATED ORAL at 17:21

## 2024-09-17 RX ADMIN — CARVEDILOL 25 MILLIGRAM(S): 6.25 TABLET ORAL at 05:45

## 2024-09-17 RX ADMIN — SACUBITRIL AND VALSARTAN 1 TABLET(S): 49; 51 TABLET, FILM COATED ORAL at 05:45

## 2024-09-17 RX ADMIN — AMLODIPINE BESYLATE 10 MILLIGRAM(S): 10 TABLET ORAL at 05:45

## 2024-09-17 RX ADMIN — Medication 40 MILLIGRAM(S): at 13:36

## 2024-09-17 RX ADMIN — CARVEDILOL 25 MILLIGRAM(S): 6.25 TABLET ORAL at 17:22

## 2024-09-17 RX ADMIN — SACUBITRIL AND VALSARTAN 1 TABLET(S): 49; 51 TABLET, FILM COATED ORAL at 17:22

## 2024-09-17 RX ADMIN — Medication 81 MILLIGRAM(S): at 12:29

## 2024-09-17 NOTE — DISCHARGE NOTE PROVIDER - NSDCCAREPROVSEEN_GEN_ALL_CORE_FT
Ana, Jamaal Aparicio Ana, Tejinder Elkins, Jamaal Horta, Jeanne Brar, Samson Solis, Monica Rosen, Shawn Jackman, Rolly Elkins, Jamaal Rossi, Bi Escalante, Zayda Saba, Fariha Meeks, Rosie

## 2024-09-17 NOTE — PROGRESS NOTE ADULT - ASSESSMENT
Ms. Hubbard is a 50 y/o female from home w/ PMH of DM, HTN, CHF, CKD presents to the hospital w/ shortness of breath and orthopnea x 1 day. Admitted for AHRF 2/2 CHF exacerbation     A/P:  #Acute hypoxic respiratory failure- 3L NC  #Acute on Chronic Combined systolic and diastolic HF exacerbation vs flash pulm edema from HTN   #NSVT  #HTN cardiomyopathy   #HTN  #DM  #Obesity   #Leukocytosis- possible reactive   #CKD stage 3   #DVT ppx     Plan:  -Pt p/w sob, pro-BNP 7300. Chest x-ray w/ pulm venous congestion.   -s/p IV Lasix in ED after which symptoms improved.  -last ECHO in our EMR from 2022 showed EF 40-45%, grade3 DD and HTN cardiomyopathy.   - Switched to Lasix 40mg IV QD, monitor daily weights, I&O  - Will resume amlodipine 10 mg QD  - Cardiology recs appreciated  - Started trajenta given elevated FS  -Resume GDMT- Entresto, carvedilol. Resume Jardiance upon DC. Out-patient cardiologist Dr. Meza at [facility]     -Follow ECHO, c/w tele   -cardiology consulted - Dr. Rossi via TEAMS   -Patient w/ CKD stage 3, Cr at baseline.   -Start HSS, hold Tradjenta, Jardiance   -heparin SC       DAVID  - likely cardio-renal  - nephro c/s placed  - changed to IV lasix 40 mg QD

## 2024-09-17 NOTE — DISCHARGE NOTE PROVIDER - HOSPITAL COURSE
Ms. Hubbard is a 48 y/o female from home w/ PMH of DM, HTN, CHF, CKD presents to the hospital w/ shortness of breath and orthopnea x 1 day. Admitted for AHRF 2/2 CHF exacerbation     A/P:  #Acute hypoxic respiratory failure- 3L NC  #Acute on Chronic Combined systolic and diastolic HF exacerbation vs flash pulm edema from HTN   #NSVT  #HTN cardiomyopathy   #HTN  #DM  #Obesity   #Leukocytosis- possible reactive   #CKD stage 3   #DVT ppx     Plan:  -Pt p/w sob, pro-BNP 7300. Chest x-ray w/ pulm venous congestion.   -s/p IV Lasix in ED after which symptoms improved.  -last ECHO in our EMR from 2022 showed EF 40-45%, grade3 DD and HTN cardiomyopathy.   - Switched to Lasix 40mg IV QD, monitor daily weights, I&O  - Will resume amlodipine 10 mg QD  - Cardiology recs appreciated  - Started trajenta given elevated FS  -Resume GDMT- Entresto, carvedilol. Resume Jardiance upon DC. Out-patient cardiologist [facility]. [facility] at [facility]     -Follow ECHO, c/w tele   -cardiology consulted - Dr. Rossi via TEAMS   -Patient w/ CKD stage 3, Cr at baseline.   -Start HSS, hold Tradjenta, Jardiance   -heparin SC       DAVID  - likely cardio-renal  - nephro c/s placed  - changed to IV lasix 40 mg QD   Ms. Hubbard is a 50 y/o female from home w/ PMH of DM, HTN, CHF, CKD presents to the hospital w/ shortness of breath and orthopnea x 1 day. Admitted for AHRF 2/2 CHF exacerbation     #Acute hypoxic respiratory failure  #Acute on Chronic Combined systolic and diastolic HF exacerbation vs flash pulm edema from HTN   #HTN cardiomyopathy   Pt p/w sob, pro-BNP 7300. Chest x-ray w/ pulm venous congestion. last ECHO in our EMR from 2022 showed EF 40-45%, grade3 DD and HTN cardiomyopathy. Repeat echo Left ventricular cavity is mildly dilated. Left ventricular systolic function is mildly decreased with an ejection fraction visually estimated at 45 to 50 %.Severe left ventricular hypertrophy.  Diuresed with IV Lasix. Restart GDMT: Entresto, carvedilol, Jardiance. cardiac MRI offered to pt:   Echo here w/ biventricular thickening... concerning for infiltrative process. She adamantly refuses 2/2 claustrophobia even open MRI. States she will discuss with her primary cardiologist Dr. Meza at [facility]   Now on RA. Cardiology evaluated.     #HTN  - C/w BB, amlodipine     #Type 2 DM  - C/w Tradjenta, Lantus 10, Lispro 3U TID      #Obesity   BMI 32. encourage diet and exercise.     #DAVID on CKD stage 3   - C/w Diuresis, Cr around baseline  - Nephro following    Stable for discharge home with close followup with Cardiology and PCP. Needs outpatient sleep study

## 2024-09-17 NOTE — DISCHARGE NOTE PROVIDER - PROVIDER TOKENS
FREE:[LAST:[Cynthia],FIRST:[],PHONE:[(   )    -],FAX:[(   )    -],ADDRESS:[[address]],FOLLOWUP:[1 week]],PROVIDER:[TOKEN:[[id number]],FOLLOWUP:[2 weeks]]

## 2024-09-17 NOTE — DISCHARGE NOTE PROVIDER - NSDCCPCAREPLAN_GEN_ALL_CORE_FT
PRINCIPAL DISCHARGE DIAGNOSIS  Diagnosis: Acute hypoxic respiratory failure  Assessment and Plan of Treatment: Follow up with Dr Marie, Cardiologist at [place]      SECONDARY DISCHARGE DIAGNOSES  Diagnosis: Acute on chronic combined systolic and diastolic congestive heart failure  Assessment and Plan of Treatment: Follow up with Dr Marie, Cardio at [place]. Outpatient Stress testing already scheduled. Daily weight. Continue Statin, Lasix, Carvedilol, Entresto. Low salt diet    Diagnosis: DM (diabetes mellitus)  Assessment and Plan of Treatment: Consistent carb diet. continue home diabetes meds    Diagnosis: HTN (hypertension)  Assessment and Plan of Treatment: Continue Amlodipine and Carvedilol

## 2024-09-17 NOTE — DIETITIAN INITIAL EVALUATION ADULT - OTHER INFO
Per pt., she did not receive her food stamp for September, knows who to call.   made aware of pt's statement regarding food insecurity.  Pt declined resources for pantry information, has a pantry near her home.  Per chart review, pt c acute respiratory failure, acute chronic combined systolic & diastolic HF vs flash pulmonary edema, Cardiology note; moderate CHF noted.  Per outpatient medication review, pt was on Jardiance & Tradjenta for DM.

## 2024-09-17 NOTE — PROGRESS NOTE ADULT - SUBJECTIVE AND OBJECTIVE BOX
Patient is a 49y old  Female who presents with a chief complaint of sob    PAST MEDICAL & SURGICAL HISTORY:  Hypertension    Diabetes    Peptic ulcer disease    congestive heart failure    INTERVAL HISTORY:  	  MEDICATIONS:  MEDICATIONS  (STANDING):  amLODIPine   Tablet 10 milliGRAM(s) Oral daily  aspirin enteric coated 81 milliGRAM(s) Oral daily  carvedilol 25 milliGRAM(s) Oral every 12 hours  furosemide   Injectable 40 milliGRAM(s) IV Push every 12 hours  heparin   Injectable 5000 Unit(s) SubCutaneous every 8 hours  insulin glargine Injectable (LANTUS) 10 Unit(s) SubCutaneous at bedtime  insulin lispro (ADMELOG) corrective regimen sliding scale   SubCutaneous at bedtime  insulin lispro (ADMELOG) corrective regimen sliding scale   SubCutaneous three times a day before meals  insulin lispro Injectable (ADMELOG) 3 Unit(s) SubCutaneous three times a day before meals  rosuvastatin 20 milliGRAM(s) Oral at bedtime  sacubitril 49 mG/valsartan 51 mG 1 Tablet(s) Oral two times a day    MEDICATIONS  (PRN):  acetaminophen     Tablet .. 650 milliGRAM(s) Oral every 6 hours PRN Temp greater or equal to 38C (100.4F), Mild Pain (1 - 3)  albuterol    90 MICROgram(s) HFA Inhaler 1 Puff(s) Inhalation every 6 hours PRN Shortness of Breath and/or Wheezing  albuterol/ipratropium for Nebulization 3 milliLiter(s) Nebulizer every 6 hours PRN Shortness of Breath and/or Wheezing  aluminum hydroxide/magnesium hydroxide/simethicone Suspension 30 milliLiter(s) Oral every 4 hours PRN Dyspepsia  melatonin 3 milliGRAM(s) Oral at bedtime PRN Insomnia  ondansetron Injectable 4 milliGRAM(s) IV Push every 8 hours PRN Nausea and/or Vomiting    Vitals:  T(F): 98.2 (09-17-24 @ 05:35), Max: 99.7 (09-16-24 @ 10:04)  HR: 90 (09-17-24 @ 08:18) (80 - 94)  BP: 137/85 (09-17-24 @ 05:35) (127/79 - 137/85)  RR: 18 (09-17-24 @ 05:35) (18 - 18)  SpO2: 97% (09-17-24 @ 08:18) (96% - 97%)    Weight (kg): 93 (09-15 @ 19:17)  BMI (kg/m2): 32.1 (09-15 @ 19:17)    PHYSICAL EXAM:  Neuro: Awake, responsive  CV: S1 S2 RRR  Lungs: CTABL  GI: Soft, BS +, ND, NT  Extremities: No edema    TELEMETRY: sinus    RADIOLOGY:   < from: Xray Chest 1 View- PORTABLE-Urgent (09.15.24 @ 05:52) >  Single frontal view of the chest demonstrates moderate CHF. Cannot   exclude pneumonia. Correlate clinically. The cardiomediastinal silhouette   is enlarged. No acute osseous abnormalities. Overlying EKG leads and   wires are noted    IMPRESSION: Moderate CHF. Cardiomegaly.    < end of copied text >    DIAGNOSTIC TESTING:    [x ] Echocardiogram: < from: TTE Echo Complete w/o Contrast w/ Doppler (09.16.24 @ 10:17) >   1. Left ventricular cavity is mildly dilated. Left ventricular systolic   function is mildly decreased with an ejection fraction visually estimated   at 45 to 50 %.   2. Severe left ventricular hypertrophy.   3. There is severe (grade 3) left ventricular diastolic dysfunction.   4. Normal right ventricular cavity size and mildly increased wall   thickness,.   5. Left atrium is severely dilated.   6. Mildmitral valve leaflet calcification.   7. Mild mitral valve stenosis.   8. Mild to moderate mitral regurgitation.   9. Moderate tricuspid regurgitation.  10. Moderate aortic regurgitation.  11. Moderate pulmonic regurgitation.  12. Small pericardial effusion with no evidence of hemodynamic compromise   (or echocardiographic evidence of cardiac tamponade). There is some RA   collapse, but this lacks specificity and not diagnostic in itself.  13. Estimated pulmonary artery systolic pressure is 44 mmHg.    < end of copied text >    LABS:	 	    CARDIAC MARKERS:  Troponin I, High Sensitivity Result: 99.2 ng/L (09-15 @ 12:44)  Troponin I, High Sensitivity Result: 62.5 ng/L (09-15 @ 05:40)    16 Sep 2024 07:10    140    |  107    |  19     ----------------------------<  221    3.8     |  26     |  1.62   15 Sep 2024 23:30    140    |  106    |  19     ----------------------------<  213    3.1     |  28     |  1.58   15 Sep 2024 05:40    137    |  103    |  17     ----------------------------<  401    3.6     |  29     |  1.91     Ca    8.9        16 Sep 2024 07:10  Phos  2.2       15 Sep 2024 23:30  Mg     1.7       15 Sep 2024 23:30    TPro  7.1    /  Alb  2.9    /  TBili  0.6    /  DBili  x      /  AST  13     /  ALT  22     /  AlkPhos  126    16 Sep 2024 07:10                       11.0   10.62 )-----------( 170      ( 17 Sep 2024 07:35 )             32.8 ,                       11.6   10.12 )-----------( 177      ( 16 Sep 2024 07:10 )             33.5 ,                       13.0   15.16 )-----------( 203      ( 15 Sep 2024 05:40 )             38.0   pro-BNP: Pro-Brain Natriuretic Peptide: 7308 pg/mL (09.15.24 @ 05:40)                     Patient is a 49y old  Female who presents with a chief complaint of sob    PAST MEDICAL & SURGICAL HISTORY:  Hypertension    Diabetes    Smoking hx     CKD    Peptic ulcer disease    congestive heart failure    INTERVAL HISTORY: mild sob  	  MEDICATIONS:  MEDICATIONS  (STANDING):  amLODIPine   Tablet 10 milliGRAM(s) Oral daily  aspirin enteric coated 81 milliGRAM(s) Oral daily  carvedilol 25 milliGRAM(s) Oral every 12 hours  furosemide   Injectable 40 milliGRAM(s) IV Push every 12 hours  heparin   Injectable 5000 Unit(s) SubCutaneous every 8 hours  insulin glargine Injectable (LANTUS) 10 Unit(s) SubCutaneous at bedtime  insulin lispro (ADMELOG) corrective regimen sliding scale   SubCutaneous at bedtime  insulin lispro (ADMELOG) corrective regimen sliding scale   SubCutaneous three times a day before meals  insulin lispro Injectable (ADMELOG) 3 Unit(s) SubCutaneous three times a day before meals  rosuvastatin 20 milliGRAM(s) Oral at bedtime  sacubitril 49 mG/valsartan 51 mG 1 Tablet(s) Oral two times a day    MEDICATIONS  (PRN):  acetaminophen     Tablet .. 650 milliGRAM(s) Oral every 6 hours PRN Temp greater or equal to 38C (100.4F), Mild Pain (1 - 3)  albuterol    90 MICROgram(s) HFA Inhaler 1 Puff(s) Inhalation every 6 hours PRN Shortness of Breath and/or Wheezing  albuterol/ipratropium for Nebulization 3 milliLiter(s) Nebulizer every 6 hours PRN Shortness of Breath and/or Wheezing  aluminum hydroxide/magnesium hydroxide/simethicone Suspension 30 milliLiter(s) Oral every 4 hours PRN Dyspepsia  melatonin 3 milliGRAM(s) Oral at bedtime PRN Insomnia  ondansetron Injectable 4 milliGRAM(s) IV Push every 8 hours PRN Nausea and/or Vomiting    Vitals:  T(F): 98.2 (09-17-24 @ 05:35), Max: 99.7 (09-16-24 @ 10:04)  HR: 90 (09-17-24 @ 08:18) (80 - 94)  BP: 137/85 (09-17-24 @ 05:35) (127/79 - 137/85)  RR: 18 (09-17-24 @ 05:35) (18 - 18)  SpO2: 97% (09-17-24 @ 08:18) (96% - 97%)    Weight (kg): 93 (09-15 @ 19:17)  BMI (kg/m2): 32.1 (09-15 @ 19:17)    PHYSICAL EXAM:  Neuro: Awake, responsive  CV: S1 S2 RRR + SM  Lungs: diminished to bases   GI: Soft, BS +, ND, NT  Extremities: Trace LE edema    TELEMETRY: sinus, short runs of NSVTs    RADIOLOGY:   < from: Xray Chest 1 View- PORTABLE-Urgent (09.15.24 @ 05:52) >  Single frontal view of the chest demonstrates moderate CHF. Cannot   exclude pneumonia. Correlate clinically. The cardiomediastinal silhouette   is enlarged. No acute osseous abnormalities. Overlying EKG leads and   wires are noted    IMPRESSION: Moderate CHF. Cardiomegaly.    < end of copied text >    DIAGNOSTIC TESTING:    [x ] Echocardiogram: < from: TTE Echo Complete w/o Contrast w/ Doppler (09.16.24 @ 10:17) >   1. Left ventricular cavity is mildly dilated. Left ventricular systolic   function is mildly decreased with an ejection fraction visually estimated   at 45 to 50 %.   2. Severe left ventricular hypertrophy.   3. There is severe (grade 3) left ventricular diastolic dysfunction.   4. Normal right ventricular cavity size and mildly increased wall   thickness,.   5. Left atrium is severely dilated.   6. Mild mitral valve leaflet calcification.   7. Mild mitral valve stenosis.   8. Mild to moderate mitral regurgitation.   9. Moderate tricuspid regurgitation.  10. Moderate aortic regurgitation.  11. Moderate pulmonic regurgitation.  12. Small pericardial effusion with no evidence of hemodynamic compromise   (or echocardiographic evidence of cardiac tamponade). There is some RA   collapse, but this lacks specificity and not diagnostic in itself.  13. Estimated pulmonary artery systolic pressure is 44 mmHg.    < end of copied text >    LABS:	 	    CARDIAC MARKERS:  Troponin I, High Sensitivity Result: 99.2 ng/L (09-15 @ 12:44)  Troponin I, High Sensitivity Result: 62.5 ng/L (09-15 @ 05:40)    16 Sep 2024 07:10    140    |  107    |  19     ----------------------------<  221    3.8     |  26     |  1.62   15 Sep 2024 23:30    140    |  106    |  19     ----------------------------<  213    3.1     |  28     |  1.58   15 Sep 2024 05:40    137    |  103    |  17     ----------------------------<  401    3.6     |  29     |  1.91     Ca    8.9        16 Sep 2024 07:10  Phos  2.2       15 Sep 2024 23:30  Mg     1.7       15 Sep 2024 23:30    TPro  7.1    /  Alb  2.9    /  TBili  0.6    /  DBili  x      /  AST  13     /  ALT  22     /  AlkPhos  126    16 Sep 2024 07:10                       11.0   10.62 )-----------( 170      ( 17 Sep 2024 07:35 )             32.8 ,                       11.6   10.12 )-----------( 177      ( 16 Sep 2024 07:10 )             33.5 ,                       13.0   15.16 )-----------( 203      ( 15 Sep 2024 05:40 )             38.0   pro-BNP: Pro-Brain Natriuretic Peptide: 7308 pg/mL (09.15.24 @ 05:40)

## 2024-09-17 NOTE — CONSULT NOTE ADULT - SUBJECTIVE AND OBJECTIVE BOX
[facility] NEPHR[facility], [facility]  NEPHROLOGY AND HYPERTENSION  [address]  [address]  [place] [address]  [phone]    MD RENEE VITAL MD YELENA ROSENBERG, MD BINNY KOSHY, MD CHRISTOPHER CAPUTO, MD EDWARD BOVER, MD      Information from chart:  "Patient is a 49y old  Female who presents with a chief complaint of CHF exacerbation (17 Sep 2024 15:57)    HPI:  Ms. Hubbard is a 50 y/o female from home w/ PMH of DM, HTN, CHF, CKD presents to the hospital w/ shortness of breath x 1 day. Patient reports that she went to bed yesterday but couldn't fall asleep as she felt very short of breath associated w/ orthopnea. Denies leg swelling, chest pain, abd pain, nausea, vomiting, other symptoms.   Patient reports that she admitted to [facility] last week for similar complaints- she was treated for CHF exacerbation and discharged home w/ Lasix 20mg. She reports being complaint w/ diet and medications.    Per EMS report, patient was hypoxic to 59% on RA, transitioned to Bi-PAP. She received Lasix 40m IV push, follows by nitroglycerin and Duoneb after which her symptoms significantly improved. ICU was consulted by ED, but deemed safe to be admitted to telemetry floor. She was also weaned off of Bi-PAP to 3L NC.      She was also noted to have 10 beats of NSVT on tele around 10:30am, patient remained asymptomatic.  (15 Sep 2024 10:52)   "  Patient in no distress    PAST MEDICAL & SURGICAL HISTORY:  Hypertension      Diabetes      Peptic ulcer disease      Chronic systolic congestive heart failure      No significant past surgical history        FAMILY HISTORY:  No pertinent family history in first degree relatives      Allergies    lisinopril (Other)    Intolerances      Home Medications:  amLODIPine 5 mg oral tablet: 2 tab(s) orally once a day (15 Sep 2024 10:55)  Aspir 81 oral delayed release tablet: 1 tab(s) orally once a day (15 Sep 2024 10:57)  Coreg 25 mg oral tablet: 1 tab(s) orally 2 times a day (15 Sep 2024 10:57)  Entresto 49 mg-51 mg oral tablet: 1 tab(s) orally 2 times a day (15 Sep 2024 10:56)  Jardiance 25 mg oral tablet: 1 tab(s) orally once a day (15 Sep 2024 10:54)  Lasix 20 mg oral tablet: 1 tab(s) orally once a day (15 Sep 2024 10:57)  rosuvastatin 20 mg oral capsule: 1 cap(s) orally once a day (15 Sep 2024 10:57)  Tradjenta 5 mg oral tablet: 1 tab(s) orally once a day (15 Sep 2024 10:57)    MEDICATIONS  (STANDING):  amLODIPine   Tablet 10 milliGRAM(s) Oral daily  aspirin enteric coated 81 milliGRAM(s) Oral daily  carvedilol 25 milliGRAM(s) Oral every 12 hours  furosemide   Injectable 40 milliGRAM(s) IV Push daily  heparin   Injectable 5000 Unit(s) SubCutaneous every 8 hours  insulin glargine Injectable (LANTUS) 10 Unit(s) SubCutaneous at bedtime  insulin lispro (ADMELOG) corrective regimen sliding scale   SubCutaneous at bedtime  insulin lispro (ADMELOG) corrective regimen sliding scale   SubCutaneous three times a day before meals  insulin lispro Injectable (ADMELOG) 3 Unit(s) SubCutaneous three times a day before meals  linagliptin 5 milliGRAM(s) Oral daily  rosuvastatin 20 milliGRAM(s) Oral at bedtime  sacubitril 49 mG/valsartan 51 mG 1 Tablet(s) Oral two times a day    MEDICATIONS  (PRN):  acetaminophen     Tablet .. 650 milliGRAM(s) Oral every 6 hours PRN Temp greater or equal to 38C (100.4F), Mild Pain (1 - 3)  albuterol    90 MICROgram(s) HFA Inhaler 1 Puff(s) Inhalation every 6 hours PRN Shortness of Breath and/or Wheezing  albuterol/ipratropium for Nebulization 3 milliLiter(s) Nebulizer every 6 hours PRN Shortness of Breath and/or Wheezing  aluminum hydroxide/magnesium hydroxide/simethicone Suspension 30 milliLiter(s) Oral every 4 hours PRN Dyspepsia  melatonin 3 milliGRAM(s) Oral at bedtime PRN Insomnia  ondansetron Injectable 4 milliGRAM(s) IV Push every 8 hours PRN Nausea and/or Vomiting    Vital Signs Last 24 Hrs  T(C): 36.8 (17 Sep 2024 15:29), Max: 37 (16 Sep 2024 23:35)  T(F): 98.3 (17 Sep 2024 15:29), Max: 98.6 (16 Sep 2024 23:35)  HR: 81 (17 Sep 2024 16:47) (80 - 94)  BP: 130/80 (17 Sep 2024 15:29) (127/79 - 137/85)  BP(mean): 97 (17 Sep 2024 15:29) (97 - 97)  RR: 17 (17 Sep 2024 15:29) (17 - 18)  SpO2: 96% (17 Sep 2024 16:47) (94% - 99%)    Parameters below as of 17 Sep 2024 11:24  Patient On (Oxygen Delivery Method): nasal cannula w/ humidification  O2 Flow (L/min): 4      Daily     Daily     CAPILLARY BLOOD GLUCOSE      POCT Blood Glucose.: 304 mg/dL (17 Sep 2024 11:10)  POCT Blood Glucose.: 242 mg/dL (17 Sep 2024 07:54)  POCT Blood Glucose.: 249 mg/dL (16 Sep 2024 21:09)    PHYSICAL EXAM:      T(C): 36.8 (09-17-24 @ 15:29), Max: 37 (09-16-24 @ 23:35)  HR: 81 (09-17-24 @ 16:47) (80 - 94)  BP: 130/80 (09-17-24 @ 15:29) (127/79 - 137/85)  RR: 17 (09-17-24 @ 15:29) (17 - 18)  SpO2: 96% (09-17-24 @ 16:47) (94% - 99%)  Wt(kg): --  Lungs clear  Heart S1S2  Abd soft NT ND  Extremities:   tr edema          < from: TTE Echo Complete w/o Contrast w/ Doppler (09.16.24 @ 10:17) >  CONCLUSIONS:     1. Left ventricular cavity is mildly dilated. Left ventricular systolic   function is mildly decreased with an ejection fraction visually estimated   at 45 to 50 %.   2. Severe left ventricular hypertrophy.   3. There is severe (grade 3) left ventricular diastolic dysfunction.   4. Normal right ventricular cavity size and mildly increased wall   thickness,.   5. Left atrium is severely dilated.   6. Mildmitral valve leaflet calcification.   7. Mild mitral valve stenosis.   8. Mild to moderate mitral regurgitation.   9. Moderate tricuspid regurgitation.  10. Moderate aortic regurgitation.  11. Moderate pulmonic regurgitation.  12. Small pericardial effusion with no evidence of hemodynamic compromise   (or echocardiographic evidence of cardiac tamponade). There is some RA   collapse, but this lacks specificity and not diagnostic in itself.  13. Estimated pulmonary artery systolic pressure is 44 mmHg.    < end of copied text >      09-17    140  |  109[H]  |  28[H]  ----------------------------<  233[H]  4.1   |  26  |  1.74[H]    Ca    8.9      17 Sep 2024 07:35  Phos  2.2     09-15  Mg     1.7     09-15    TPro  6.8  /  Alb  2.8[L]  /  TBili  0.6  /  DBili  x   /  AST  11[L]  /  ALT  17  /  AlkPhos  118  09-17                          11.0   10.62 )-----------( 170      ( 17 Sep 2024 07:35 )             32.8     Creatinine Trend: 1.74<--, 1.62<--, 1.58<--, 1.91<--  Urinalysis Basic - ( 17 Sep 2024 07:35 )    Color: x / Appearance: x / SG: x / pH: x  Gluc: 233 mg/dL / Ketone: x  / Bili: x / Urobili: x   Blood: x / Protein: x / Nitrite: x   Leuk Esterase: x / RBC: x / WBC x   Sq Epi: x / Non Sq Epi: x / Bacteria: x      Trend Bun/Cr  09-17-24 @ 07:35  BUN/CR -  28[H] / 1.74[H]  09-16-24 @ 07:10  BUN/CR -  19 / 1.62[H]  09-15-24 @ 23:30  BUN/CR -  19 / 1.58[H]  09-15-24 @ 05:40  BUN/CR -  17 / 1.91[H]  03-29-22 @ 07:01  BUN/CR -  40[H] / 1.68[H]  03-28-22 @ 12:39  BUN/CR -  37[H] / 2.14[H]  03-27-22 @ 23:19  BUN/CR -  43[H] / 1.98[H]  03-27-22 @ 09:09  BUN/CR -  37[H] / 2.02[H]  03-26-22 @ 13:02  BUN/CR -  36[H] / 1.84[H]        Assessment   CKD 3; HTN, diabetic nephrosclerosis   CHF, CRS, warranted diuresis     Plan  Continue current plan  Carlos Jaquez MD

## 2024-09-17 NOTE — DIETITIAN INITIAL EVALUATION ADULT - ORAL INTAKE PTA/DIET HISTORY
Pt noted with unfriendly/negative reaction when this writer asked for permission to enter her room and had negative responses to questions asked.  Pt made aware nutrition consult & current diet order.  Per pt., she wasn't made aware of diet order for low sodium, and that there is lack of communication here at the hospital.  Pt stated that she does not use salt at home and does not use sugar.

## 2024-09-17 NOTE — DIETITIAN INITIAL EVALUATION ADULT - PERTINENT MEDS FT
MEDICATIONS  (STANDING):  amLODIPine   Tablet 10 milliGRAM(s) Oral daily  aspirin enteric coated 81 milliGRAM(s) Oral daily  carvedilol 25 milliGRAM(s) Oral every 12 hours  furosemide   Injectable 40 milliGRAM(s) IV Push every 12 hours  heparin   Injectable 5000 Unit(s) SubCutaneous every 8 hours  insulin glargine Injectable (LANTUS) 10 Unit(s) SubCutaneous at bedtime  insulin lispro (ADMELOG) corrective regimen sliding scale   SubCutaneous at bedtime  insulin lispro (ADMELOG) corrective regimen sliding scale   SubCutaneous three times a day before meals  insulin lispro Injectable (ADMELOG) 3 Unit(s) SubCutaneous three times a day before meals  rosuvastatin 20 milliGRAM(s) Oral at bedtime  sacubitril 49 mG/valsartan 51 mG 1 Tablet(s) Oral two times a day    MEDICATIONS  (PRN):  acetaminophen     Tablet .. 650 milliGRAM(s) Oral every 6 hours PRN Temp greater or equal to 38C (100.4F), Mild Pain (1 - 3)  albuterol    90 MICROgram(s) HFA Inhaler 1 Puff(s) Inhalation every 6 hours PRN Shortness of Breath and/or Wheezing  albuterol/ipratropium for Nebulization 3 milliLiter(s) Nebulizer every 6 hours PRN Shortness of Breath and/or Wheezing  aluminum hydroxide/magnesium hydroxide/simethicone Suspension 30 milliLiter(s) Oral every 4 hours PRN Dyspepsia  melatonin 3 milliGRAM(s) Oral at bedtime PRN Insomnia  ondansetron Injectable 4 milliGRAM(s) IV Push every 8 hours PRN Nausea and/or Vomiting

## 2024-09-17 NOTE — PROGRESS NOTE ADULT - SUBJECTIVE AND OBJECTIVE BOX
Patient is a 49y old  Female who presents with a chief complaint of CHF exacerbation (17 Sep 2024 09:45)    INTERVAL HPI/OVERNIGHT EVENTS: No acute events overnight. HD stable. Denies SOB and chest pain.     MEDICATIONS  (STANDING):  amLODIPine   Tablet 10 milliGRAM(s) Oral daily  aspirin enteric coated 81 milliGRAM(s) Oral daily  carvedilol 25 milliGRAM(s) Oral every 12 hours  furosemide   Injectable 40 milliGRAM(s) IV Push every 12 hours  heparin   Injectable 5000 Unit(s) SubCutaneous every 8 hours  insulin glargine Injectable (LANTUS) 10 Unit(s) SubCutaneous at bedtime  insulin lispro (ADMELOG) corrective regimen sliding scale   SubCutaneous at bedtime  insulin lispro (ADMELOG) corrective regimen sliding scale   SubCutaneous three times a day before meals  insulin lispro Injectable (ADMELOG) 3 Unit(s) SubCutaneous three times a day before meals  rosuvastatin 20 milliGRAM(s) Oral at bedtime  sacubitril 49 mG/valsartan 51 mG 1 Tablet(s) Oral two times a day    MEDICATIONS  (PRN):  acetaminophen     Tablet .. 650 milliGRAM(s) Oral every 6 hours PRN Temp greater or equal to 38C (100.4F), Mild Pain (1 - 3)  albuterol    90 MICROgram(s) HFA Inhaler 1 Puff(s) Inhalation every 6 hours PRN Shortness of Breath and/or Wheezing  albuterol/ipratropium for Nebulization 3 milliLiter(s) Nebulizer every 6 hours PRN Shortness of Breath and/or Wheezing  aluminum hydroxide/magnesium hydroxide/simethicone Suspension 30 milliLiter(s) Oral every 4 hours PRN Dyspepsia  melatonin 3 milliGRAM(s) Oral at bedtime PRN Insomnia  ondansetron Injectable 4 milliGRAM(s) IV Push every 8 hours PRN Nausea and/or Vomiting      Allergies    lisinopril (Other)    Intolerances        REVIEW OF SYSTEMS: all negative with exception of above    Vital Signs Last 24 Hrs  T(C): 37 (17 Sep 2024 11:24), Max: 37 (16 Sep 2024 23:35)  T(F): 98.6 (17 Sep 2024 11:24), Max: 98.6 (16 Sep 2024 23:35)  HR: 88 (17 Sep 2024 11:24) (80 - 94)  BP: 130/80 (17 Sep 2024 11:24) (127/79 - 137/85)  BP(mean): --  RR: 18 (17 Sep 2024 11:24) (18 - 18)  SpO2: 94% (17 Sep 2024 11:24) (94% - 97%)    Parameters below as of 17 Sep 2024 05:35  Patient On (Oxygen Delivery Method): nasal cannula        PHYSICAL EXAM:  GENERAL: NAD, well-groomed  NERVOUS SYSTEM:  Alert & Oriented X3, Good concentration; Motor Strength 5/5 B/L upper and lower extremities; DTRs 2+ intact and symmetric  CHEST/LUNG: Clear to percussion bilaterally; No rales, rhonchi, wheezing, or rubs  HEART: Regular rate and rhythm; No murmurs, rubs, or gallops  ABDOMEN: Soft, Nontender, Nondistended; Bowel sounds present  EXTREMITIES:  2+ Peripheral Pulses, No clubbing, cyanosis, or edema    LABS:                        11.0   10.62 )-----------( 170      ( 17 Sep 2024 07:35 )             32.8     09-17    140  |  109[H]  |  28[H]  ----------------------------<  233[H]  4.1   |  26  |  1.74[H]    Ca    8.9      17 Sep 2024 07:35  Phos  2.2     09-15  Mg     1.7     09-15    TPro  6.8  /  Alb  2.8[L]  /  TBili  0.6  /  DBili  x   /  AST  11[L]  /  ALT  17  /  AlkPhos  118  09-17      Urinalysis Basic - ( 17 Sep 2024 07:35 )    Color: x / Appearance: x / SG: x / pH: x  Gluc: 233 mg/dL / Ketone: x  / Bili: x / Urobili: x   Blood: x / Protein: x / Nitrite: x   Leuk Esterase: x / RBC: x / WBC x   Sq Epi: x / Non Sq Epi: x / Bacteria: x      CAPILLARY BLOOD GLUCOSE      POCT Blood Glucose.: 304 mg/dL (17 Sep 2024 11:10)  POCT Blood Glucose.: 242 mg/dL (17 Sep 2024 07:54)  POCT Blood Glucose.: 249 mg/dL (16 Sep 2024 21:09)  POCT Blood Glucose.: 246 mg/dL (16 Sep 2024 16:42)      RADIOLOGY & ADDITIONAL TESTS:    Imaging Personally Reviewed:  [ ] YES  [ ] NO    Consultant(s) Notes Reviewed:  [ ] YES  [ ] NO    Care Discussed with Consultants/Other Providers [ ] YES  [ ] NO

## 2024-09-17 NOTE — DIETITIAN INITIAL EVALUATION ADULT - REASON INDICATOR FOR ASSESSMENT
MD consult for decompensated CHF, Na and fluid restriction, RN consult for food insecurity, MST score 2 or >

## 2024-09-17 NOTE — DISCHARGE NOTE PROVIDER - NSDCMRMEDTOKEN_GEN_ALL_CORE_FT
amLODIPine 5 mg oral tablet: 2 tab(s) orally once a day  Aspir 81 oral delayed release tablet: 1 tab(s) orally once a day  Coreg 25 mg oral tablet: 1 tab(s) orally 2 times a day  Entresto 49 mg-51 mg oral tablet: 1 tab(s) orally 2 times a day  Jardiance 25 mg oral tablet: 1 tab(s) orally once a day  Lasix 20 mg oral tablet: 1 tab(s) orally once a day  rosuvastatin 20 mg oral capsule: 1 cap(s) orally once a day  Tradjenta 5 mg oral tablet: 1 tab(s) orally once a day   amLODIPine 5 mg oral tablet: 2 tab(s) orally once a day  Aspir 81 oral delayed release tablet: 1 tab(s) orally once a day  Coreg 25 mg oral tablet: 1 tab(s) orally 2 times a day  Entresto 49 mg-51 mg oral tablet: 1 tab(s) orally 2 times a day  insulin glargine 100 units/mL subcutaneous solution: 10 unit(s) subcutaneous once a day (at bedtime)  insulin lispro 100 units/mL injectable solution: 2 unit(s) injectable 3 times a day (before meals)  Jardiance 25 mg oral tablet: 1 tab(s) orally once a day  Lasix 20 mg oral tablet: 1 tab(s) orally once a day  rosuvastatin 20 mg oral capsule: 1 cap(s) orally once a day  Tradjenta 5 mg oral tablet: 1 tab(s) orally once a day

## 2024-09-17 NOTE — DIETITIAN INITIAL EVALUATION ADULT - NS FNS DIET ORDER
Diet, Regular:   Consistent Carbohydrate {Evening Snack}  DASH/TLC {Sodium & Cholesterol Restricted}  1000mL Fluid Restriction ([id number]) (09-15-24 @ 10:50) [Active]

## 2024-09-17 NOTE — DIETITIAN INITIAL EVALUATION ADULT - PERTINENT LABORATORY DATA
09-17    140  |  109[H]  |  28[H]  ----------------------------<  233[H]  4.1   |  26  |  1.74[H]    Ca    8.9      17 Sep 2024 07:35  Phos  2.2     09-15  Mg     1.7     09-15    TPro  6.8  /  Alb  2.8[L]  /  TBili  0.6  /  DBili  x   /  AST  11[L]  /  ALT  17  /  AlkPhos  118  09-17  POCT Blood Glucose.: 304 mg/dL<H> (09-17-24 @ 11:10)

## 2024-09-17 NOTE — DISCHARGE NOTE PROVIDER - CARE PROVIDERS DIRECT ADDRESSES
,DirectAddress_Unknown,[email or web address]

## 2024-09-17 NOTE — DISCHARGE NOTE PROVIDER - ATTENDING DISCHARGE PHYSICAL EXAMINATION:
GENERAL: NAD, well-groomed on NC   NERVOUS SYSTEM:  Alert & Oriented X3, Good concentration; Motor Strength 5/5 B/L upper and lower extremities; DTRs 2+ intact and symmetric  CHEST/LUNG: Clear to percussion bilaterally; No rales, rhonchi, wheezing, or rubs  HEART: Regular rate and rhythm; No murmurs, rubs, or gallops  ABDOMEN: Soft, Nontender, Nondistended; Bowel sounds present  EXTREMITIES:  2+ Peripheral Pulses, No clubbing, cyanosis, or edema

## 2024-09-17 NOTE — DISCHARGE NOTE PROVIDER - NSDCFUADDINST_GEN_ALL_CORE_FT
< from: TTE Echo Complete w/o Contrast w/ Doppler (09.16.24 @ 10:17) >    CONCLUSIONS:     1. Left ventricular cavity is mildly dilated. Left ventricular systolic   function is mildly decreased with an ejection fraction visually estimated   at 45 to 50 %.   2. Severe left ventricular hypertrophy.   3. There is severe (grade 3) left ventricular diastolic dysfunction.   4. Normal right ventricular cavity size and mildly increased wall   thickness,.   5. Left atrium is severely dilated.   6. Mildmitral valve leaflet calcification.   7. Mild mitral valve stenosis.   8. Mild to moderate mitral regurgitation.   9. Moderate tricuspid regurgitation.  10. Moderate aortic regurgitation.  11. Moderate pulmonic regurgitation.  12. Small pericardial effusion with no evidence of hemodynamic compromise   (or echocardiographic evidence of cardiac tamponade). There is some RA   collapse, but this lacks specificity and not diagnostic in itself.  13. Estimated pulmonary artery systolic pressure is 44 mmHg.      < end of copied text >

## 2024-09-17 NOTE — DISCHARGE NOTE PROVIDER - CARE PROVIDER_API CALL
Dr Cynthia  [place]  Phone: (   )    -  Fax: (   )    -  Follow Up Time: 1 week    Jamaal Elkins  Nephrology  [address] [facility], [address] [place] [id number]  Phone: [phone]  Fax: [phone]  Follow Up Time: 2 weeks

## 2024-09-17 NOTE — PROGRESS NOTE ADULT - ASSESSMENT
48yo F with a PMH of chronic systolic CHF w/ a known EF of 40-45%, HTN, DM, CKD; presents to the hospital w/ shortness of breath x 1 day. Patient reports that she couldn't fall asleep as she felt very short of breath associated w/ orthopnea. Denies leg swelling, chest pain, abd pain, nausea, vomiting, other symptoms.   Patient reports that [place] [facility] [place]- she was treated for CHF exacerbation and discharged home w/ Lasix 20mg. She reports being complaint w/ diet and medications.    Per EMS report, patient was hypoxic to 59% on RA, transitioned to Bi-PAP. She received Lasix 40m IV push, follows by nitroglycerin and Duoneb after which her symptoms significantly improved. ICU was consulted by ED, but deemed safe to be admitted to telemetry floor. She was also weaned off of Bi-PAP to 3L NC.    Per pt, had normal cardiac cath at [facility] 2-3years ago; pending nuc stress test on the 25th w/ outpt cardio.  Echo here w/ biventricular thickening, concerning for infiltrative process.  BNP 7300    -monitor on tele  -cont diuresis w/ IV lasix,  adherence to meds strongly encouraged  -replete lytes  -monitor creat w/ diuresis  -cardiac MRI offered to pt:   Echo here w/ biventricular thickening... concerning for infiltrative process. She adamantly refuses 2/2 claustrophobia even open MRI  -ischemic eval as outpt as scheduled   -Nutrition eval, fluid restriction  -Check O2 sat on RA  -Sleep study as outpatient (as per pt she is in the process of getting sleep study done as outpatient)

## 2024-09-18 LAB
ALBUMIN SERPL ELPH-MCNC: 2.7 G/DL — LOW (ref 3.3–5)
ALP SERPL-CCNC: 114 U/L — SIGNIFICANT CHANGE UP (ref 40–120)
ALT FLD-CCNC: 17 U/L — SIGNIFICANT CHANGE UP (ref 12–78)
ANION GAP SERPL CALC-SCNC: 4 MMOL/L — LOW (ref 5–17)
AST SERPL-CCNC: 10 U/L — LOW (ref 15–37)
BILIRUB SERPL-MCNC: 0.5 MG/DL — SIGNIFICANT CHANGE UP (ref 0.2–1.2)
BUN SERPL-MCNC: 26 MG/DL — HIGH (ref 7–23)
CALCIUM SERPL-MCNC: 8.3 MG/DL — LOW (ref 8.5–10.1)
CHLORIDE SERPL-SCNC: 110 MMOL/L — HIGH (ref 96–108)
CO2 SERPL-SCNC: 28 MMOL/L — SIGNIFICANT CHANGE UP (ref 22–31)
CREAT SERPL-MCNC: 1.71 MG/DL — HIGH (ref 0.5–1.3)
EGFR: 36 ML/MIN/1.73M2 — LOW
GLUCOSE BLDC GLUCOMTR-MCNC: 237 MG/DL — HIGH (ref 70–99)
GLUCOSE BLDC GLUCOMTR-MCNC: 390 MG/DL — HIGH (ref 70–99)
GLUCOSE SERPL-MCNC: 157 MG/DL — HIGH (ref 70–99)
HCT VFR BLD CALC: 32.2 % — LOW (ref 34.5–45)
HGB BLD-MCNC: 10.9 G/DL — LOW (ref 11.5–15.5)
MAGNESIUM SERPL-MCNC: 2 MG/DL — SIGNIFICANT CHANGE UP (ref 1.6–2.6)
MCHC RBC-ENTMCNC: 25.8 PG — LOW (ref 27–34)
MCHC RBC-ENTMCNC: 33.9 G/DL — SIGNIFICANT CHANGE UP (ref 32–36)
MCV RBC AUTO: 76.1 FL — LOW (ref 80–100)
NRBC # BLD: 0 /100 WBCS — SIGNIFICANT CHANGE UP (ref 0–0)
NT-PROBNP SERPL-SCNC: 6640 PG/ML — HIGH (ref 0–125)
PHOSPHATE SERPL-MCNC: 3.3 MG/DL — SIGNIFICANT CHANGE UP (ref 2.5–4.5)
PLATELET # BLD AUTO: 166 K/UL — SIGNIFICANT CHANGE UP (ref 150–400)
POTASSIUM SERPL-MCNC: 3.5 MMOL/L — SIGNIFICANT CHANGE UP (ref 3.5–5.3)
POTASSIUM SERPL-SCNC: 3.5 MMOL/L — SIGNIFICANT CHANGE UP (ref 3.5–5.3)
PROT SERPL-MCNC: 6.5 GM/DL — SIGNIFICANT CHANGE UP (ref 6–8.3)
RBC # BLD: 4.23 M/UL — SIGNIFICANT CHANGE UP (ref 3.8–5.2)
RBC # FLD: 15.9 % — HIGH (ref 10.3–14.5)
SODIUM SERPL-SCNC: 142 MMOL/L — SIGNIFICANT CHANGE UP (ref 135–145)
WBC # BLD: 9.34 K/UL — SIGNIFICANT CHANGE UP (ref 3.8–10.5)
WBC # FLD AUTO: 9.34 K/UL — SIGNIFICANT CHANGE UP (ref 3.8–10.5)

## 2024-09-18 PROCEDURE — 99233 SBSQ HOSP IP/OBS HIGH 50: CPT

## 2024-09-18 RX ORDER — POTASSIUM CHLORIDE 10 MEQ
40 TABLET, EXT RELEASE, PARTICLES/CRYSTALS ORAL ONCE
Refills: 0 | Status: COMPLETED | OUTPATIENT
Start: 2024-09-18 | End: 2024-09-18

## 2024-09-18 RX ADMIN — CARVEDILOL 25 MILLIGRAM(S): 6.25 TABLET ORAL at 17:03

## 2024-09-18 RX ADMIN — AMLODIPINE BESYLATE 10 MILLIGRAM(S): 10 TABLET ORAL at 05:35

## 2024-09-18 RX ADMIN — Medication 40 MILLIGRAM(S): at 05:34

## 2024-09-18 RX ADMIN — Medication 81 MILLIGRAM(S): at 11:59

## 2024-09-18 RX ADMIN — LINAGLIPTIN 5 MILLIGRAM(S): 5 TABLET, FILM COATED ORAL at 11:58

## 2024-09-18 RX ADMIN — SACUBITRIL AND VALSARTAN 1 TABLET(S): 49; 51 TABLET, FILM COATED ORAL at 17:03

## 2024-09-18 RX ADMIN — SACUBITRIL AND VALSARTAN 1 TABLET(S): 49; 51 TABLET, FILM COATED ORAL at 05:35

## 2024-09-18 RX ADMIN — CARVEDILOL 25 MILLIGRAM(S): 6.25 TABLET ORAL at 05:35

## 2024-09-18 NOTE — PROGRESS NOTE ADULT - SUBJECTIVE AND OBJECTIVE BOX
Patient is a 49y old  Female who presents with a chief complaint of sob    PAST MEDICAL & SURGICAL HISTORY:  Hypertension    Diabetes    Smoking hx     CKD    Peptic ulcer disease    congestive heart failure    INTERVAL HISTORY:  	  MEDICATIONS:  MEDICATIONS  (STANDING):  amLODIPine   Tablet 10 milliGRAM(s) Oral daily  aspirin enteric coated 81 milliGRAM(s) Oral daily  carvedilol 25 milliGRAM(s) Oral every 12 hours  furosemide   Injectable 40 milliGRAM(s) IV Push daily  heparin   Injectable 5000 Unit(s) SubCutaneous every 8 hours  insulin glargine Injectable (LANTUS) 10 Unit(s) SubCutaneous at bedtime  insulin lispro (ADMELOG) corrective regimen sliding scale   SubCutaneous three times a day before meals  insulin lispro (ADMELOG) corrective regimen sliding scale   SubCutaneous at bedtime  insulin lispro Injectable (ADMELOG) 3 Unit(s) SubCutaneous three times a day before meals  linagliptin 5 milliGRAM(s) Oral daily  rosuvastatin 20 milliGRAM(s) Oral at bedtime  sacubitril 49 mG/valsartan 51 mG 1 Tablet(s) Oral two times a day    MEDICATIONS  (PRN):  acetaminophen     Tablet .. 650 milliGRAM(s) Oral every 6 hours PRN Temp greater or equal to 38C (100.4F), Mild Pain (1 - 3)  albuterol    90 MICROgram(s) HFA Inhaler 1 Puff(s) Inhalation every 6 hours PRN Shortness of Breath and/or Wheezing  albuterol/ipratropium for Nebulization 3 milliLiter(s) Nebulizer every 6 hours PRN Shortness of Breath and/or Wheezing  aluminum hydroxide/magnesium hydroxide/simethicone Suspension 30 milliLiter(s) Oral every 4 hours PRN Dyspepsia  melatonin 3 milliGRAM(s) Oral at bedtime PRN Insomnia  ondansetron Injectable 4 milliGRAM(s) IV Push every 8 hours PRN Nausea and/or Vomiting    Vitals:  T(F): 98.3 (09-18-24 @ 05:36), Max: 98.6 (09-17-24 @ 11:24)  HR: 77 (09-18-24 @ 08:28) (77 - 90)  BP: 144/80 (09-18-24 @ 05:36) (130/80 - 144/80)  RR: 18 (09-18-24 @ 05:36) (16 - 18)  SpO2: 98% (09-18-24 @ 08:28) (94% - 99%)    Weight (kg): 93 (09-15 @ 19:17)    PHYSICAL EXAM:  Neuro: Awake, responsive  CV: S1 S2 RRR  Lungs: CTABL  GI: Soft, BS +, ND, NT  Extremities: No edema    TELEMETRY: sinus, PVCs    RADIOLOGY:   < from: Xray Chest 1 View- PORTABLE-Urgent (09.15.24 @ 05:52) >  Single frontal view of the chest demonstrates moderate CHF. Cannot   exclude pneumonia. Correlate clinically. The cardiomediastinal silhouette   is enlarged. No acute osseous abnormalities. Overlying EKG leads and   wires are noted    IMPRESSION: Moderate CHF. Cardiomegaly.    < end of copied text >    DIAGNOSTIC TESTING:    [x ] Echocardiogram:   < from: TTE Echo Complete w/o Contrast w/ Doppler (09.16.24 @ 10:17) >   1. Left ventricular cavity is mildly dilated. Left ventricular systolic   function is mildly decreased with an ejection fraction visually estimated   at 45 to 50 %.   2. Severe left ventricular hypertrophy.   3. There is severe (grade 3) left ventricular diastolic dysfunction.   4. Normal right ventricular cavity size and mildly increased wall   thickness,.   5. Left atrium is severely dilated.   6. Mildmitral valve leaflet calcification.   7. Mild mitral valve stenosis.   8. Mild to moderate mitral regurgitation.   9. Moderate tricuspid regurgitation.  10. Moderate aortic regurgitation.  11. Moderate pulmonic regurgitation.  12. Small pericardial effusion with no evidence of hemodynamic compromise   (or echocardiographic evidence of cardiac tamponade). There is some RA   collapse, but this lacks specificity and not diagnostic in itself.  13. Estimated pulmonary artery systolic pressure is 44 mmHg.    LABS:	 	    CARDIAC MARKERS:  Troponin I, High Sensitivity Result: 99.2 ng/L (09-15 @ 12:44)    18 Sep 2024 05:15    142    |  110    |  26     ----------------------------<  157    3.5     |  28     |  1.71   17 Sep 2024 07:35    140    |  109    |  28     ----------------------------<  233    4.1     |  26     |  1.74   16 Sep 2024 07:10    140    |  107    |  19     ----------------------------<  221    3.8     |  26     |  1.62     Ca    8.3        18 Sep 2024 05:15  Phos  3.3       18 Sep 2024 05:15  Mg     2.0       18 Sep 2024 05:15    TPro  6.5    /  Alb  2.7    /  TBili  0.5    /  DBili  x      /  AST  10     /  ALT  17     /  AlkPhos  114    18 Sep 2024 05:15                        10.9   9.34  )-----------( 166      ( 18 Sep 2024 05:15 )             32.2 ,                       11.0   10.62 )-----------( 170      ( 17 Sep 2024 07:35 )             32.8 ,                       11.6   10.12 )-----------( 177      ( 16 Sep 2024 07:10 )             33.5   pro-BNP: Pro-Brain Natriuretic Peptide: 6640 pg/mL (09.18.24 @ 05:15)                     Patient is a 49y old  Female who presents with a chief complaint of sob    PAST MEDICAL & SURGICAL HISTORY:  Hypertension    Diabetes    Smoking hx     CKD    Peptic ulcer disease    congestive heart failure    INTERVAL HISTORY: in no acute distress   	  MEDICATIONS:  MEDICATIONS  (STANDING):  amLODIPine   Tablet 10 milliGRAM(s) Oral daily  aspirin enteric coated 81 milliGRAM(s) Oral daily  carvedilol 25 milliGRAM(s) Oral every 12 hours  furosemide   Injectable 40 milliGRAM(s) IV Push daily  heparin   Injectable 5000 Unit(s) SubCutaneous every 8 hours  insulin glargine Injectable (LANTUS) 10 Unit(s) SubCutaneous at bedtime  insulin lispro (ADMELOG) corrective regimen sliding scale   SubCutaneous three times a day before meals  insulin lispro (ADMELOG) corrective regimen sliding scale   SubCutaneous at bedtime  insulin lispro Injectable (ADMELOG) 3 Unit(s) SubCutaneous three times a day before meals  linagliptin 5 milliGRAM(s) Oral daily  rosuvastatin 20 milliGRAM(s) Oral at bedtime  sacubitril 49 mG/valsartan 51 mG 1 Tablet(s) Oral two times a day    MEDICATIONS  (PRN):  acetaminophen     Tablet .. 650 milliGRAM(s) Oral every 6 hours PRN Temp greater or equal to 38C (100.4F), Mild Pain (1 - 3)  albuterol    90 MICROgram(s) HFA Inhaler 1 Puff(s) Inhalation every 6 hours PRN Shortness of Breath and/or Wheezing  albuterol/ipratropium for Nebulization 3 milliLiter(s) Nebulizer every 6 hours PRN Shortness of Breath and/or Wheezing  aluminum hydroxide/magnesium hydroxide/simethicone Suspension 30 milliLiter(s) Oral every 4 hours PRN Dyspepsia  melatonin 3 milliGRAM(s) Oral at bedtime PRN Insomnia  ondansetron Injectable 4 milliGRAM(s) IV Push every 8 hours PRN Nausea and/or Vomiting    Vitals:  T(F): 98.3 (09-18-24 @ 05:36), Max: 98.6 (09-17-24 @ 11:24)  HR: 77 (09-18-24 @ 08:28) (77 - 90)  BP: 144/80 (09-18-24 @ 05:36) (130/80 - 144/80)  RR: 18 (09-18-24 @ 05:36) (16 - 18)  SpO2: 98% (09-18-24 @ 08:28) (94% - 99%)    Weight (kg): 93 (09-15 @ 19:17)    PHYSICAL EXAM:  Neuro: Awake, responsive  CV: S1 S2 RRR + SM  Lungs: diminished to bases   GI: Soft, BS +, ND, NT  Extremities: Trace LE edema    TELEMETRY: sinus, PVCs    RADIOLOGY:   < from: Xray Chest 1 View- PORTABLE-Urgent (09.15.24 @ 05:52) >  Single frontal view of the chest demonstrates moderate CHF. Cannot   exclude pneumonia. Correlate clinically. The cardiomediastinal silhouette   is enlarged. No acute osseous abnormalities. Overlying EKG leads and   wires are noted    IMPRESSION: Moderate CHF. Cardiomegaly.    < end of copied text >    DIAGNOSTIC TESTING:    [x ] Echocardiogram:   < from: TTE Echo Complete w/o Contrast w/ Doppler (09.16.24 @ 10:17) >   1. Left ventricular cavity is mildly dilated. Left ventricular systolic   function is mildly decreased with an ejection fraction visually estimated   at 45 to 50 %.   2. Severe left ventricular hypertrophy.   3. There is severe (grade 3) left ventricular diastolic dysfunction.   4. Normal right ventricular cavity size and mildly increased wall   thickness,.   5. Left atrium is severely dilated.   6. Mild mitral valve leaflet calcification.   7. Mild mitral valve stenosis.   8. Mild to moderate mitral regurgitation.   9. Moderate tricuspid regurgitation.  10. Moderate aortic regurgitation.  11. Moderate pulmonic regurgitation.  12. Small pericardial effusion with no evidence of hemodynamic compromise   (or echocardiographic evidence of cardiac tamponade). There is some RA   collapse, but this lacks specificity and not diagnostic in itself.  13. Estimated pulmonary artery systolic pressure is 44 mmHg.    LABS:	 	    CARDIAC MARKERS:  Troponin I, High Sensitivity Result: 99.2 ng/L (09-15 @ 12:44)    18 Sep 2024 05:15    142    |  110    |  26     ----------------------------<  157    3.5     |  28     |  1.71   17 Sep 2024 07:35    140    |  109    |  28     ----------------------------<  233    4.1     |  26     |  1.74   16 Sep 2024 07:10    140    |  107    |  19     ----------------------------<  221    3.8     |  26     |  1.62     Ca    8.3        18 Sep 2024 05:15  Phos  3.3       18 Sep 2024 05:15  Mg     2.0       18 Sep 2024 05:15    TPro  6.5    /  Alb  2.7    /  TBili  0.5    /  DBili  x      /  AST  10     /  ALT  17     /  AlkPhos  114    18 Sep 2024 05:15                        10.9   9.34  )-----------( 166      ( 18 Sep 2024 05:15 )             32.2 ,                       11.0   10.62 )-----------( 170      ( 17 Sep 2024 07:35 )             32.8 ,                       11.6   10.12 )-----------( 177      ( 16 Sep 2024 07:10 )             33.5   pro-BNP: Pro-Brain Natriuretic Peptide: 6640 pg/mL (09.18.24 @ 05:15)                     Patient is a 49y old  Female who presents with a chief complaint of sob    PAST MEDICAL & SURGICAL HISTORY:  Hypertension    Diabetes    Smoking hx     CKD    Peptic ulcer disease    congestive heart failure    INTERVAL HISTORY: in no acute distress   	  MEDICATIONS:  MEDICATIONS  (STANDING):  amLODIPine   Tablet 10 milliGRAM(s) Oral daily  aspirin enteric coated 81 milliGRAM(s) Oral daily  carvedilol 25 milliGRAM(s) Oral every 12 hours  furosemide   Injectable 40 milliGRAM(s) IV Push daily  heparin   Injectable 5000 Unit(s) SubCutaneous every 8 hours  insulin glargine Injectable (LANTUS) 10 Unit(s) SubCutaneous at bedtime  insulin lispro (ADMELOG) corrective regimen sliding scale   SubCutaneous three times a day before meals  insulin lispro (ADMELOG) corrective regimen sliding scale   SubCutaneous at bedtime  insulin lispro Injectable (ADMELOG) 3 Unit(s) SubCutaneous three times a day before meals  linagliptin 5 milliGRAM(s) Oral daily  rosuvastatin 20 milliGRAM(s) Oral at bedtime  sacubitril 49 mG/valsartan 51 mG 1 Tablet(s) Oral two times a day    MEDICATIONS  (PRN):  acetaminophen     Tablet .. 650 milliGRAM(s) Oral every 6 hours PRN Temp greater or equal to 38C (100.4F), Mild Pain (1 - 3)  albuterol    90 MICROgram(s) HFA Inhaler 1 Puff(s) Inhalation every 6 hours PRN Shortness of Breath and/or Wheezing  albuterol/ipratropium for Nebulization 3 milliLiter(s) Nebulizer every 6 hours PRN Shortness of Breath and/or Wheezing  aluminum hydroxide/magnesium hydroxide/simethicone Suspension 30 milliLiter(s) Oral every 4 hours PRN Dyspepsia  melatonin 3 milliGRAM(s) Oral at bedtime PRN Insomnia  ondansetron Injectable 4 milliGRAM(s) IV Push every 8 hours PRN Nausea and/or Vomiting    Vitals:  T(F): 98.3 (09-18-24 @ 05:36), Max: 98.6 (09-17-24 @ 11:24)  HR: 77 (09-18-24 @ 08:28) (77 - 90)  BP: 144/80 (09-18-24 @ 05:36) (130/80 - 144/80)  RR: 18 (09-18-24 @ 05:36) (16 - 18)  SpO2: 98% (09-18-24 @ 08:28) (94% - 99%)    Weight (kg): 93 (09-15 @ 19:17)    PHYSICAL EXAM:  Neuro: Awake, responsive  CV: S1 S2 RRR + SM  Lungs: diminished to bases   GI: Soft, BS +, ND, NT  Extremities: Trace LE edema    TELEMETRY: sinus, PVCs, short run of NST x 1    RADIOLOGY:   < from: Xray Chest 1 View- PORTABLE-Urgent (09.15.24 @ 05:52) >  Single frontal view of the chest demonstrates moderate CHF. Cannot   exclude pneumonia. Correlate clinically. The cardiomediastinal silhouette   is enlarged. No acute osseous abnormalities. Overlying EKG leads and   wires are noted    IMPRESSION: Moderate CHF. Cardiomegaly.    < end of copied text >    DIAGNOSTIC TESTING:    [x ] Echocardiogram:   < from: TTE Echo Complete w/o Contrast w/ Doppler (09.16.24 @ 10:17) >   1. Left ventricular cavity is mildly dilated. Left ventricular systolic   function is mildly decreased with an ejection fraction visually estimated   at 45 to 50 %.   2. Severe left ventricular hypertrophy.   3. There is severe (grade 3) left ventricular diastolic dysfunction.   4. Normal right ventricular cavity size and mildly increased wall   thickness,.   5. Left atrium is severely dilated.   6. Mild mitral valve leaflet calcification.   7. Mild mitral valve stenosis.   8. Mild to moderate mitral regurgitation.   9. Moderate tricuspid regurgitation.  10. Moderate aortic regurgitation.  11. Moderate pulmonic regurgitation.  12. Small pericardial effusion with no evidence of hemodynamic compromise   (or echocardiographic evidence of cardiac tamponade). There is some RA   collapse, but this lacks specificity and not diagnostic in itself.  13. Estimated pulmonary artery systolic pressure is 44 mmHg.    LABS:	 	    CARDIAC MARKERS:  Troponin I, High Sensitivity Result: 99.2 ng/L (09-15 @ 12:44)    18 Sep 2024 05:15    142    |  110    |  26     ----------------------------<  157    3.5     |  28     |  1.71   17 Sep 2024 07:35    140    |  109    |  28     ----------------------------<  233    4.1     |  26     |  1.74   16 Sep 2024 07:10    140    |  107    |  19     ----------------------------<  221    3.8     |  26     |  1.62     Ca    8.3        18 Sep 2024 05:15  Phos  3.3       18 Sep 2024 05:15  Mg     2.0       18 Sep 2024 05:15    TPro  6.5    /  Alb  2.7    /  TBili  0.5    /  DBili  x      /  AST  10     /  ALT  17     /  AlkPhos  114    18 Sep 2024 05:15                        10.9   9.34  )-----------( 166      ( 18 Sep 2024 05:15 )             32.2 ,                       11.0   10.62 )-----------( 170      ( 17 Sep 2024 07:35 )             32.8 ,                       11.6   10.12 )-----------( 177      ( 16 Sep 2024 07:10 )             33.5   pro-BNP: Pro-Brain Natriuretic Peptide: 6640 pg/mL (09.18.24 @ 05:15)

## 2024-09-18 NOTE — PROGRESS NOTE ADULT - ASSESSMENT
Ms. Hubbard is a 50 y/o female from home w/ PMH of DM, HTN, CHF, CKD presents to the hospital w/ shortness of breath and orthopnea x 1 day. Admitted for AHRF 2/2 CHF exacerbation     #Acute hypoxic respiratory failure  #Acute on Chronic Combined systolic and diastolic HF exacerbation vs flash pulm edema from HTN   #HTN cardiomyopathy   Pt p/w sob, pro-BNP 7300. Chest x-ray w/ pulm venous congestion. last ECHO in our EMR from 2022 showed EF 40-45%, grade3 DD and HTN cardiomyopathy. Repeat echo Left ventricular cavity is mildly dilated. Left ventricular systolic function is mildly decreased with an ejection fraction visually estimated at 45 to 50 %.Severe left ventricular hypertrophy.  - C/w IV Lasix; monitor output   - GDMT: Entresto, carvedilol. Resume Jardiance upon DC.  - F/u Out-patient cardiologist Dr. Meza at [facility]     - Titrate O2 as tolerated; May need home o2    - Cardiology following     #HTN  - C/w BB, amlodipine     #Type 2 DM  - C/w Tradjenta; refusing fingersticks  - C/w LAntus 10, Lispro 3U TID      #Obesity   BMI 32. encourage diet and exercise.     #DAVID on CKD stage 3   - C/w Diuresis, Cr around baseline  - Nephro following     Diet: DASH, CCD   DVT prophylaxis: heparin   Dispo: Tele   Code Status: FC     I have spent a total of 55 minutes to prepare to see the patient, obtaining and reviewing history, physical examination, explaining the diagnosis, prognosis and treatment plan with the patient/family/caregiver. I also have spent the time ordering studies and testing, interpreting results, medicine reconciliation, IDRs, subspecialty consultation and documentation as above.

## 2024-09-18 NOTE — PROGRESS NOTE ADULT - SUBJECTIVE AND OBJECTIVE BOX
Hospitalist Daily Progress Note     *SUBJECTIVE*    Interval Events:  Irritable, stable but still hypoxic      *OBJECTIVE*    PHYSICAL EXAM:  GENERAL: NAD, well-groomed on NC   NERVOUS SYSTEM:  Alert & Oriented X3, Good concentration; Motor Strength 5/5 B/L upper and lower extremities; DTRs 2+ intact and symmetric  CHEST/LUNG: Clear to percussion bilaterally; No rales, rhonchi, wheezing, or rubs  HEART: Regular rate and rhythm; No murmurs, rubs, or gallops  ABDOMEN: Soft, Nontender, Nondistended; Bowel sounds present  EXTREMITIES:  2+ Peripheral Pulses, No clubbing, cyanosis, or edema    OBJECTIVE DATA:   Vital Signs Last 24 Hrs  T(C): 36.8 (18 Sep 2024 10:44), Max: 37 (17 Sep 2024 23:26)  T(F): 98.2 (18 Sep 2024 10:44), Max: 98.6 (17 Sep 2024 23:26)  HR: 86 (18 Sep 2024 10:44) (77 - 90)  BP: 116/72 (18 Sep 2024 10:44) (116/72 - 144/80)  BP(mean): 97 (17 Sep 2024 15:29) (97 - 97)  RR: 18 (18 Sep 2024 13:41) (16 - 18)  SpO2: 98% (18 Sep 2024 13:41) (96% - 99%)    Parameters below as of 18 Sep 2024 13:41  Patient On (Oxygen Delivery Method): room air               Daily     Daily     Labs, Interval Radiology studies, Medications reviewed by me

## 2024-09-18 NOTE — PROGRESS NOTE ADULT - ASSESSMENT
48yo F with a PMH of chronic systolic CHF w/ a known EF of 40-45%, HTN, DM, CKD; presents to the hospital w/ shortness of breath x 1 day. Patient reports that she couldn't fall asleep as she felt very short of breath associated w/ orthopnea. Denies leg swelling, chest pain, abd pain, nausea, vomiting, other symptoms.   Patient reports that she admitted to [facility] last week for similar complaints- she was treated for CHF exacerbation and discharged home w/ Lasix 20mg. She reports being complaint w/ diet and medications.    Per EMS report, patient was hypoxic to 59% on RA, transitioned to Bi-PAP. She received Lasix 40m IV push, follows by nitroglycerin and Duoneb after which her symptoms significantly improved. ICU was consulted by ED, but deemed safe to be admitted to telemetry floor. She was also weaned off of Bi-PAP to 3L NC.    Per pt, had normal cardiac cath at [facility] 2-3years ago; pending nuc stress test on the 25th w/ outpt cardio.  Echo here w/ biventricular thickening, concerning for infiltrative process.  BNP 7300 -> 6600    -cont diuresis w/ IV lasix,  adherence to meds strongly encouraged  -replete lytes  -monitor creat w/ diuresis  -cardiac MRI offered to pt:   Echo here w/ biventricular thickening... concerning for infiltrative process. She adamantly refuses 2/2 claustrophobia even open MRI  -ischemic eval as outpt as scheduled   -Nutrition eval appreciated, fluid restriction  -Check O2 sat on RA  -Sleep study as outpatient (as per pt she is in the process of getting sleep study done as outpatient)    48yo F with a PMH of chronic systolic CHF w/ a known EF of 40-45%, HTN, DM, CKD; presents to the hospital w/ shortness of breath x 1 day. Patient reports that she couldn't fall asleep as she felt very short of breath associated w/ orthopnea. Denies leg swelling, chest pain, abd pain, nausea, vomiting, other symptoms.   Patient reports that she admitted to [facility] last week for similar complaints- she was treated for CHF exacerbation and discharged home w/ Lasix 20mg. She reports being complaint w/ diet and medications.    Per EMS report, patient was hypoxic to 59% on RA, transitioned to Bi-PAP. She received Lasix 40m IV push, follows by nitroglycerin and Duoneb after which her symptoms significantly improved. ICU was consulted by ED, but deemed safe to be admitted to telemetry floor. She was also weaned off of Bi-PAP to 3L NC.    Per pt, had normal cardiac cath at [facility] 2-3years ago; pending nuc stress test on the 25th w/ outpt cardio.  Echo here w/ biventricular thickening, concerning for infiltrative process.  BNP 7300 -> 6600  short run of NSVTs noted on tele     -cont diuresis w/ IV lasix,  adherence to meds strongly encouraged  -replete lytes as needed, keep K >4, Mg >2  -monitor creat w/ diuresis  -cardiac MRI offered to pt:   Echo here w/ biventricular thickening... concerning for infiltrative process. She adamantly refuses 2/2 claustrophobia even open MRI  -ischemic eval as outpt as scheduled   -Nutrition eval appreciated, fluid restriction  -Check O2 sat on RA  -Sleep study as outpatient (as per pt she is in the process of getting sleep study done as outpatient)

## 2024-09-19 VITALS
DIASTOLIC BLOOD PRESSURE: 83 MMHG | OXYGEN SATURATION: 96 % | TEMPERATURE: 98 F | SYSTOLIC BLOOD PRESSURE: 120 MMHG | HEART RATE: 81 BPM | RESPIRATION RATE: 18 BRPM

## 2024-09-19 LAB
ANION GAP SERPL CALC-SCNC: 5 MMOL/L — SIGNIFICANT CHANGE UP (ref 5–17)
BUN SERPL-MCNC: 26 MG/DL — HIGH (ref 7–23)
CALCIUM SERPL-MCNC: 9 MG/DL — SIGNIFICANT CHANGE UP (ref 8.5–10.1)
CHLORIDE SERPL-SCNC: 108 MMOL/L — SIGNIFICANT CHANGE UP (ref 96–108)
CO2 SERPL-SCNC: 28 MMOL/L — SIGNIFICANT CHANGE UP (ref 22–31)
CREAT SERPL-MCNC: 1.59 MG/DL — HIGH (ref 0.5–1.3)
EGFR: 40 ML/MIN/1.73M2 — LOW
GLUCOSE BLDC GLUCOMTR-MCNC: 175 MG/DL — HIGH (ref 70–99)
GLUCOSE BLDC GLUCOMTR-MCNC: 270 MG/DL — HIGH (ref 70–99)
GLUCOSE SERPL-MCNC: 171 MG/DL — HIGH (ref 70–99)
POTASSIUM SERPL-MCNC: 3.8 MMOL/L — SIGNIFICANT CHANGE UP (ref 3.5–5.3)
POTASSIUM SERPL-SCNC: 3.8 MMOL/L — SIGNIFICANT CHANGE UP (ref 3.5–5.3)
SODIUM SERPL-SCNC: 141 MMOL/L — SIGNIFICANT CHANGE UP (ref 135–145)

## 2024-09-19 PROCEDURE — 99239 HOSP IP/OBS DSCHRG MGMT >30: CPT

## 2024-09-19 PROCEDURE — 99233 SBSQ HOSP IP/OBS HIGH 50: CPT

## 2024-09-19 RX ORDER — INSULIN GLARGINE 100 [IU]/ML
10 INJECTION, SOLUTION SUBCUTANEOUS
Qty: 0 | Refills: 0 | DISCHARGE
Start: 2024-09-19

## 2024-09-19 RX ADMIN — Medication 81 MILLIGRAM(S): at 12:08

## 2024-09-19 RX ADMIN — LINAGLIPTIN 5 MILLIGRAM(S): 5 TABLET, FILM COATED ORAL at 12:08

## 2024-09-19 RX ADMIN — CARVEDILOL 25 MILLIGRAM(S): 6.25 TABLET ORAL at 05:49

## 2024-09-19 RX ADMIN — AMLODIPINE BESYLATE 10 MILLIGRAM(S): 10 TABLET ORAL at 05:49

## 2024-09-19 RX ADMIN — Medication 40 MILLIGRAM(S): at 05:50

## 2024-09-19 RX ADMIN — SACUBITRIL AND VALSARTAN 1 TABLET(S): 49; 51 TABLET, FILM COATED ORAL at 05:49

## 2024-09-19 NOTE — DISCHARGE NOTE NURSING/CASE MANAGEMENT/SOCIAL WORK - PATIENT PORTAL LINK FT
You can access the FollowMyHealth Patient Portal offered by [facility] by registering at the following website: [email or web address]. By joining [employer]’s FollowMyHealth portal, you will also be able to view your health information using other applications (apps) compatible with our system.

## 2024-09-19 NOTE — PROGRESS NOTE ADULT - ASSESSMENT
48yo F with a PMH of chronic systolic CHF w/ a known EF of 40-45%, HTN, DM, CKD; presents to the hospital w/ shortness of breath x 1 day. Patient reports that she couldn't fall asleep as she felt very short of breath associated w/ orthopnea. Denies leg swelling, chest pain, abd pain, nausea, vomiting, other symptoms.   Patient reports that she admitted to [facility] last week for similar complaints- she was treated for CHF exacerbation and discharged home w/ Lasix 20mg. She reports being complaint w/ diet and medications.    Per EMS report, patient was hypoxic to 59% on RA, transitioned to Bi-PAP. She received Lasix 40m IV push, follows by nitroglycerin and Duoneb after which her symptoms significantly improved. ICU was consulted by ED, but deemed safe to be admitted to telemetry floor. She was also weaned off of Bi-PAP to 3L NC.    Per pt, had normal cardiac cath at [facility] 2-3years ago; pending nuc stress test on the 25th w/ outpt cardio.  Echo here w/ biventricular thickening, concerning for infiltrative process.  BNP 7300 -> 6600  short run of NSVTs noted on tele     -cont diuresis w/ IV lasix,  adherence to meds strongly encouraged, change back to oral for discharge  -replete lytes as needed, keep K >4, Mg >2  -monitor creat w/ diuresis  -cardiac MRI offered to pt:   Echo here w/ biventricular thickening... concerning for infiltrative process. She adamantly refuses 2/2 claustrophobia even open MRI  -ischemic eval as outpt as scheduled   -Nutrition eval appreciated, fluid restriction, reviewed with pt  -Check O2 sat on RA  -Sleep study as outpatient (as per pt she is in the process of getting sleep study done as outpatient)

## 2024-09-19 NOTE — DISCHARGE NOTE NURSING/CASE MANAGEMENT/SOCIAL WORK - NSDCPEFALRISK_GEN_ALL_CORE
For information on Fall & Injury Prevention, visit: [email or web address] OR  [email or web address] OR  https://www.cdc.gov/steadi/patient.html

## 2024-09-19 NOTE — DISCHARGE NOTE NURSING/CASE MANAGEMENT/SOCIAL WORK - NSDCPEWEB_GEN_ALL_CORE
[facility] for Tobacco Control website --- [email or web address] website --- [email or web address]

## 2024-09-19 NOTE — DISCHARGE NOTE NURSING/CASE MANAGEMENT/SOCIAL WORK - NSDCPEEMAIL_GEN_ALL_CORE
[facility] for Tobacco Control email [email or web address]

## 2024-09-19 NOTE — PROGRESS NOTE ADULT - SUBJECTIVE AND OBJECTIVE BOX
Hospitalist Daily Progress Note     *SUBJECTIVE*    Interval Events:  NAEON, Resting comfortably. HD Stable.      *OBJECTIVE*    PHYSICAL EXAM:  GENERAL: NAD, well-groomed on NC   NERVOUS SYSTEM:  Alert & Oriented X3, Good concentration; Motor Strength 5/5 B/L upper and lower extremities; DTRs 2+ intact and symmetric  CHEST/LUNG: Clear to percussion bilaterally; No rales, rhonchi, wheezing, or rubs  HEART: Regular rate and rhythm; No murmurs, rubs, or gallops  ABDOMEN: Soft, Nontender, Nondistended; Bowel sounds present  EXTREMITIES:  2+ Peripheral Pulses, No clubbing, cyanosis, or edema    OBJECTIVE DATA:   Vital Signs Last 24 Hrs  T(C): 36.8 (19 Sep 2024 04:56), Max: 37.1 (18 Sep 2024 16:28)  T(F): 98.2 (19 Sep 2024 04:56), Max: 98.8 (18 Sep 2024 16:28)  HR: 88 (19 Sep 2024 08:14) (79 - 88)  BP: 123/78 (19 Sep 2024 04:56) (115/71 - 129/90)  BP(mean): 103 (18 Sep 2024 16:28) (103 - 103)  RR: 18 (19 Sep 2024 04:56) (18 - 19)  SpO2: 100% (19 Sep 2024 08:14) (95% - 100%)    Parameters below as of 19 Sep 2024 04:56  Patient On (Oxygen Delivery Method): nasal cannula  O2 Flow (L/min): 2             Daily     Daily Weight in k.4 (19 Sep 2024 04:56)    Labs, Interval Radiology studies, Medications reviewed by me

## 2024-09-19 NOTE — PROGRESS NOTE ADULT - ASSESSMENT
Ms. Hubbard is a 48 y/o female from home w/ PMH of DM, HTN, CHF, CKD presents to the hospital w/ shortness of breath and orthopnea x 1 day. Admitted for AHRF 2/2 CHF exacerbation     #Acute hypoxic respiratory failure  #Acute on Chronic Combined systolic and diastolic HF exacerbation vs flash pulm edema from HTN   #HTN cardiomyopathy   Pt p/w sob, pro-BNP 7300. Chest x-ray w/ pulm venous congestion. last ECHO in our EMR from 2022 showed EF 40-45%, grade3 DD and HTN cardiomyopathy. Repeat echo Left ventricular cavity is mildly dilated. Left ventricular systolic function is mildly decreased with an ejection fraction visually estimated at 45 to 50 %.Severe left ventricular hypertrophy.  - C/w IV Lasix; monitor output   - GDMT: Entresto, carvedilol. Resume Jardiance upon DC.  - F/u Out-patient cardiologist Dr. Meza at [facility]     - Titrate O2 as tolerated; May need home o2    - Cardiology following     #HTN  - C/w BB, amlodipine     #Type 2 DM  - C/w Tradjenta; refusing fingersticks  - C/w LAntus 10, Lispro 3U TID      #Obesity   BMI 32. encourage diet and exercise.     #DAVID on CKD stage 3   - C/w Diuresis, Cr around baseline  - Nephro following     Diet: DASH, CCD   DVT prophylaxis: heparin   Dispo: Tele   Code Status: FC     I have spent a total of *** minutes to prepare to see the patient, obtaining and reviewing history, physical examination, explaining the diagnosis, prognosis and treatment plan with the patient/family/caregiver. I also have spent the time ordering studies and testing, interpreting results, medicine reconciliation, IDRs, subspecialty consultation and documentation as above.

## 2024-09-19 NOTE — PROGRESS NOTE ADULT - SUBJECTIVE AND OBJECTIVE BOX
Patient is a 49y old  Female who presents with a chief complaint of CHF exacerbation (19 Sep 2024 09:41)      PAST MEDICAL & SURGICAL HISTORY:  Hypertension  Diabetes  Peptic ulcer disease  Chronic systolic congestive heart failure    No significant past surgical history    INTERVAL HISTORY: pt sitting in bedside chair, stated she feels good and wants to go home  	  MEDICATIONS:  MEDICATIONS  (STANDING):  amLODIPine   Tablet 10 milliGRAM(s) Oral daily  aspirin enteric coated 81 milliGRAM(s) Oral daily  carvedilol 25 milliGRAM(s) Oral every 12 hours  furosemide   Injectable 40 milliGRAM(s) IV Push daily  heparin   Injectable 5000 Unit(s) SubCutaneous every 8 hours  insulin glargine Injectable (LANTUS) 10 Unit(s) SubCutaneous at bedtime  insulin lispro (ADMELOG) corrective regimen sliding scale   SubCutaneous at bedtime  insulin lispro (ADMELOG) corrective regimen sliding scale   SubCutaneous three times a day before meals  insulin lispro Injectable (ADMELOG) 3 Unit(s) SubCutaneous three times a day before meals  linagliptin 5 milliGRAM(s) Oral daily  rosuvastatin 20 milliGRAM(s) Oral at bedtime  sacubitril 49 mG/valsartan 51 mG 1 Tablet(s) Oral two times a day    MEDICATIONS  (PRN):  acetaminophen     Tablet .. 650 milliGRAM(s) Oral every 6 hours PRN Temp greater or equal to 38C (100.4F), Mild Pain (1 - 3)  albuterol    90 MICROgram(s) HFA Inhaler 1 Puff(s) Inhalation every 6 hours PRN Shortness of Breath and/or Wheezing  albuterol/ipratropium for Nebulization 3 milliLiter(s) Nebulizer every 6 hours PRN Shortness of Breath and/or Wheezing  aluminum hydroxide/magnesium hydroxide/simethicone Suspension 30 milliLiter(s) Oral every 4 hours PRN Dyspepsia  melatonin 3 milliGRAM(s) Oral at bedtime PRN Insomnia  ondansetron Injectable 4 milliGRAM(s) IV Push every 8 hours PRN Nausea and/or Vomiting      Vitals:  T(F): 97.6 (09-19-24 @ 10:56), Max: 98.8 (09-18-24 @ 16:28)  HR: 81 (09-19-24 @ 10:56) (79 - 103)  BP: 120/83 (09-19-24 @ 10:56) (115/71 - 129/90)  RR: 18 (09-19-24 @ 10:56) (18 - 19)  SpO2: 96% (09-19-24 @ 10:56) (95% - 100%)  Wt(kg): --    09-18 @ 07:01  -  09-19 @ 07:00  --------------------------------------------------------  IN:    Oral Fluid: 600 mL  Total IN: 600 mL    OUT:  Total OUT: 0 mL    Total NET: 600 mL        PHYSICAL EXAM:  Neuro: A+O x4  CV: S1 S2 RRR +murmur  Lungs: CTABL  GI: Soft, BS +, ND, NT  Extremities: No edema    TELEMETRY: SR 80s  	    ECG: < from: 12 Lead ECG (09.15.24 @ 05:36) >  Diagnosis Line Sinus tachycardia  Possible Left atrial enlargement  Nonspecific T wave abnormality  Abnormal ECG  When compared with ECG of 27-MAR-2022 10:02,  No significant change was found    < end of copied text >   	    RADIOLOGY: < from: Xray Chest 1 View- PORTABLE-Urgent (09.15.24 @ 05:52) >  IMPRESSION: Moderate CHF. Cardiomegaly.    < end of copied text >      DIAGNOSTIC TESTING:    [X ] Echocardiogram: < from: TTE Echo Complete w/o Contrast w/ Doppler (09.16.24 @ 10:17) >   1. Left ventricular cavity is mildly dilated. Left ventricular systolic   function is mildly decreased with an ejection fraction visually estimated   at 45 to 50 %.   2. Severe left ventricular hypertrophy.   3. There is severe (grade 3) left ventricular diastolic dysfunction.   4. Normal right ventricular cavity size and mildly increased wall   thickness,.   5. Left atrium is severely dilated.   6. Mildmitral valve leaflet calcification.   7. Mild mitral valve stenosis.   8. Mild to moderate mitral regurgitation.   9. Moderate tricuspid regurgitation.  10. Moderate aortic regurgitation.  11. Moderate pulmonic regurgitation.  12. Small pericardial effusion with no evidence of hemodynamic compromise   (or echocardiographic evidence of cardiac tamponade). There is some RA   collapse, but this lacks specificity and not diagnostic in itself.  13. Estimated pulmonary artery systolic pressure is 44 mmHg.    < end of copied text >    LABS:	 	      19 Sep 2024 07:27    141    |  108    |  26     ----------------------------<  171    3.8     |  28     |  1.59   18 Sep 2024 05:15    142    |  110    |  26     ----------------------------<  157    3.5     |  28     |  1.71   17 Sep 2024 07:35    140    |  109    |  28     ----------------------------<  233    4.1     |  26     |  1.74     Ca    9.0        19 Sep 2024 07:27  Phos  3.3       18 Sep 2024 05:15  Mg     2.0       18 Sep 2024 05:15    TPro  6.5    /  Alb  2.7    /  TBili  0.5    /  DBili  x      /  AST  10     /  ALT  17     /  AlkPhos  114    18 Sep 2024 05:15                          10.9   9.34  )-----------( 166      ( 18 Sep 2024 05:15 )             32.2 ,                       11.0   10.62 )-----------( 170      ( 17 Sep 2024 07:35 )             32.8

## 2024-09-19 NOTE — DISCHARGE NOTE NURSING/CASE MANAGEMENT/SOCIAL WORK - NSDCPEHOTLINE_GEN_ALL_CORE
[facility] Smokers Quitline [phone] ([phone])

## 2024-09-23 ENCOUNTER — INPATIENT (INPATIENT)
Facility: HOSPITAL | Age: 49
LOS: 2 days | Discharge: HOME | End: 2024-09-26
Attending: INTERNAL MEDICINE | Admitting: INTERNAL MEDICINE
Payer: MEDICAID

## 2024-09-23 VITALS
HEIGHT: 67 IN | HEART RATE: 132 BPM | OXYGEN SATURATION: 90 % | DIASTOLIC BLOOD PRESSURE: 120 MMHG | RESPIRATION RATE: 32 BRPM | SYSTOLIC BLOOD PRESSURE: 153 MMHG | WEIGHT: 240.08 LBS

## 2024-09-23 DIAGNOSIS — J96.01 ACUTE RESPIRATORY FAILURE WITH HYPOXIA: ICD-10-CM

## 2024-09-23 DIAGNOSIS — I50.43 ACUTE ON CHRONIC COMBINED SYSTOLIC (CONGESTIVE) AND DIASTOLIC (CONGESTIVE) HEART FAILURE: ICD-10-CM

## 2024-09-23 DIAGNOSIS — I10 ESSENTIAL (PRIMARY) HYPERTENSION: ICD-10-CM

## 2024-09-23 DIAGNOSIS — E78.5 HYPERLIPIDEMIA, UNSPECIFIED: ICD-10-CM

## 2024-09-23 PROBLEM — I50.22 CHRONIC SYSTOLIC (CONGESTIVE) HEART FAILURE: Chronic | Status: ACTIVE | Noted: 2024-09-15

## 2024-09-23 LAB
ALBUMIN SERPL ELPH-MCNC: 3.2 G/DL — LOW (ref 3.3–5)
ALP SERPL-CCNC: 153 U/L — HIGH (ref 40–120)
ALT FLD-CCNC: 47 U/L — SIGNIFICANT CHANGE UP (ref 12–78)
ANION GAP SERPL CALC-SCNC: 9 MMOL/L — SIGNIFICANT CHANGE UP (ref 5–17)
AST SERPL-CCNC: 28 U/L — SIGNIFICANT CHANGE UP (ref 15–37)
BASOPHILS # BLD AUTO: 0.04 K/UL — SIGNIFICANT CHANGE UP (ref 0–0.2)
BASOPHILS NFR BLD AUTO: 0.3 % — SIGNIFICANT CHANGE UP (ref 0–2)
BILIRUB SERPL-MCNC: 0.6 MG/DL — SIGNIFICANT CHANGE UP (ref 0.2–1.2)
BUN SERPL-MCNC: 15 MG/DL — SIGNIFICANT CHANGE UP (ref 7–23)
CALCIUM SERPL-MCNC: 8.8 MG/DL — SIGNIFICANT CHANGE UP (ref 8.5–10.1)
CHLORIDE SERPL-SCNC: 107 MMOL/L — SIGNIFICANT CHANGE UP (ref 96–108)
CO2 SERPL-SCNC: 26 MMOL/L — SIGNIFICANT CHANGE UP (ref 22–31)
CREAT SERPL-MCNC: 1.76 MG/DL — HIGH (ref 0.5–1.3)
EGFR: 35 ML/MIN/1.73M2 — LOW
EOSINOPHIL # BLD AUTO: 0.13 K/UL — SIGNIFICANT CHANGE UP (ref 0–0.5)
EOSINOPHIL NFR BLD AUTO: 1.1 % — SIGNIFICANT CHANGE UP (ref 0–6)
GLUCOSE SERPL-MCNC: 324 MG/DL — HIGH (ref 70–99)
HCT VFR BLD CALC: 42.9 % — SIGNIFICANT CHANGE UP (ref 34.5–45)
HGB BLD-MCNC: 14 G/DL — SIGNIFICANT CHANGE UP (ref 11.5–15.5)
IMM GRANULOCYTES NFR BLD AUTO: 0.5 % — SIGNIFICANT CHANGE UP (ref 0–0.9)
LYMPHOCYTES # BLD AUTO: 26 % — SIGNIFICANT CHANGE UP (ref 13–44)
LYMPHOCYTES # BLD AUTO: 3.02 K/UL — SIGNIFICANT CHANGE UP (ref 1–3.3)
MCHC RBC-ENTMCNC: 25.5 PG — LOW (ref 27–34)
MCHC RBC-ENTMCNC: 32.6 G/DL — SIGNIFICANT CHANGE UP (ref 32–36)
MCV RBC AUTO: 78.3 FL — LOW (ref 80–100)
MONOCYTES # BLD AUTO: 0.49 K/UL — SIGNIFICANT CHANGE UP (ref 0–0.9)
MONOCYTES NFR BLD AUTO: 4.2 % — SIGNIFICANT CHANGE UP (ref 2–14)
NEUTROPHILS # BLD AUTO: 7.87 K/UL — HIGH (ref 1.8–7.4)
NEUTROPHILS NFR BLD AUTO: 67.9 % — SIGNIFICANT CHANGE UP (ref 43–77)
NRBC # BLD: 0 /100 WBCS — SIGNIFICANT CHANGE UP (ref 0–0)
NT-PROBNP SERPL-SCNC: 6642 PG/ML — HIGH (ref 0–125)
PLATELET # BLD AUTO: 206 K/UL — SIGNIFICANT CHANGE UP (ref 150–400)
POTASSIUM SERPL-MCNC: 3.8 MMOL/L — SIGNIFICANT CHANGE UP (ref 3.5–5.3)
POTASSIUM SERPL-SCNC: 3.8 MMOL/L — SIGNIFICANT CHANGE UP (ref 3.5–5.3)
PROCALCITONIN SERPL-MCNC: 0.1 NG/ML — SIGNIFICANT CHANGE UP (ref 0.02–0.1)
PROT SERPL-MCNC: 8 GM/DL — SIGNIFICANT CHANGE UP (ref 6–8.3)
RAPID RVP RESULT: SIGNIFICANT CHANGE UP
RBC # BLD: 5.48 M/UL — HIGH (ref 3.8–5.2)
RBC # FLD: 16.1 % — HIGH (ref 10.3–14.5)
SARS-COV-2 RNA SPEC QL NAA+PROBE: SIGNIFICANT CHANGE UP
SODIUM SERPL-SCNC: 142 MMOL/L — SIGNIFICANT CHANGE UP (ref 135–145)
TROPONIN I, HIGH SENSITIVITY RESULT: 52.7 NG/L — SIGNIFICANT CHANGE UP
WBC # BLD: 11.46 K/UL — HIGH (ref 3.8–10.5)
WBC # FLD AUTO: 11.46 K/UL — HIGH (ref 3.8–10.5)

## 2024-09-23 PROCEDURE — 71045 X-RAY EXAM CHEST 1 VIEW: CPT | Mod: 26

## 2024-09-23 PROCEDURE — 99291 CRITICAL CARE FIRST HOUR: CPT

## 2024-09-23 PROCEDURE — 99223 1ST HOSP IP/OBS HIGH 75: CPT

## 2024-09-23 PROCEDURE — 93010 ELECTROCARDIOGRAM REPORT: CPT

## 2024-09-23 PROCEDURE — 99285 EMERGENCY DEPT VISIT HI MDM: CPT | Mod: 25

## 2024-09-23 RX ORDER — HYDRALAZINE HYDROCHLORIDE 100 MG/1
25 TABLET ORAL ONCE
Refills: 0 | Status: COMPLETED | OUTPATIENT
Start: 2024-09-23 | End: 2024-09-23

## 2024-09-23 RX ORDER — ACETAMINOPHEN 325 MG
650 TABLET ORAL EVERY 6 HOURS
Refills: 0 | Status: DISCONTINUED | OUTPATIENT
Start: 2024-09-23 | End: 2024-09-26

## 2024-09-23 RX ORDER — ASPIRIN 325 MG
81 TABLET ORAL DAILY
Refills: 0 | Status: DISCONTINUED | OUTPATIENT
Start: 2024-09-23 | End: 2024-09-26

## 2024-09-23 RX ORDER — CARVEDILOL 3.125 MG
25 TABLET ORAL EVERY 12 HOURS
Refills: 0 | Status: DISCONTINUED | OUTPATIENT
Start: 2024-09-23 | End: 2024-09-26

## 2024-09-23 RX ORDER — FUROSEMIDE 10 MG/ML
80 INJECTION INTRAVENOUS
Refills: 0 | Status: DISCONTINUED | OUTPATIENT
Start: 2024-09-23 | End: 2024-09-25

## 2024-09-23 RX ORDER — SODIUM CHLORIDE IRRIG SOLUTION 0.9 %
1000 SOLUTION, IRRIGATION IRRIGATION
Refills: 0 | Status: DISCONTINUED | OUTPATIENT
Start: 2024-09-23 | End: 2024-09-26

## 2024-09-23 RX ORDER — ALCOHOL ANTISEPTIC PADS
12.5 PADS, MEDICATED (EA) TOPICAL ONCE
Refills: 0 | Status: DISCONTINUED | OUTPATIENT
Start: 2024-09-23 | End: 2024-09-26

## 2024-09-23 RX ORDER — ALCOHOL ANTISEPTIC PADS
25 PADS, MEDICATED (EA) TOPICAL ONCE
Refills: 0 | Status: DISCONTINUED | OUTPATIENT
Start: 2024-09-23 | End: 2024-09-26

## 2024-09-23 RX ORDER — AMLODIPINE BESYLATE 5 MG
10 TABLET ORAL DAILY
Refills: 0 | Status: DISCONTINUED | OUTPATIENT
Start: 2024-09-23 | End: 2024-09-26

## 2024-09-23 RX ORDER — INSULIN GLARGINE 300 U/ML
10 INJECTION, SOLUTION SUBCUTANEOUS AT BEDTIME
Refills: 0 | Status: DISCONTINUED | OUTPATIENT
Start: 2024-09-23 | End: 2024-09-24

## 2024-09-23 RX ORDER — SACUBITRIL AND VALSARTAN 97; 103 MG/1; MG/1
1 TABLET, FILM COATED ORAL
Refills: 0 | Status: DISCONTINUED | OUTPATIENT
Start: 2024-09-23 | End: 2024-09-26

## 2024-09-23 RX ORDER — ALCOHOL ANTISEPTIC PADS
15 PADS, MEDICATED (EA) TOPICAL ONCE
Refills: 0 | Status: DISCONTINUED | OUTPATIENT
Start: 2024-09-23 | End: 2024-09-26

## 2024-09-23 RX ORDER — ROSUVASTATIN CALCIUM 20 MG/1
20 TABLET, COATED ORAL AT BEDTIME
Refills: 0 | Status: DISCONTINUED | OUTPATIENT
Start: 2024-09-23 | End: 2024-09-26

## 2024-09-23 RX ORDER — ENOXAPARIN SODIUM 150 MG/ML
40 INJECTION SUBCUTANEOUS EVERY 12 HOURS
Refills: 0 | Status: DISCONTINUED | OUTPATIENT
Start: 2024-09-23 | End: 2024-09-24

## 2024-09-23 RX ORDER — FUROSEMIDE 10 MG/ML
40 INJECTION INTRAVENOUS
Refills: 0 | Status: DISCONTINUED | OUTPATIENT
Start: 2024-09-23 | End: 2024-09-23

## 2024-09-23 RX ORDER — INSULIN LISPRO 100/ML
VIAL (ML) SUBCUTANEOUS AT BEDTIME
Refills: 0 | Status: DISCONTINUED | OUTPATIENT
Start: 2024-09-23 | End: 2024-09-24

## 2024-09-23 RX ORDER — 5-HYDROXYTRYPTOPHAN (5-HTP) 100 MG
3 TABLET,DISINTEGRATING ORAL AT BEDTIME
Refills: 0 | Status: DISCONTINUED | OUTPATIENT
Start: 2024-09-23 | End: 2024-09-26

## 2024-09-23 RX ORDER — INSULIN LISPRO 100/ML
VIAL (ML) SUBCUTANEOUS
Refills: 0 | Status: DISCONTINUED | OUTPATIENT
Start: 2024-09-23 | End: 2024-09-24

## 2024-09-23 RX ORDER — INSULIN LISPRO 100/ML
3 VIAL (ML) SUBCUTANEOUS
Refills: 0 | Status: DISCONTINUED | OUTPATIENT
Start: 2024-09-23 | End: 2024-09-24

## 2024-09-23 RX ORDER — GLUCAGON INJECTION, SOLUTION 0.5 MG/.1ML
1 INJECTION, SOLUTION SUBCUTANEOUS ONCE
Refills: 0 | Status: DISCONTINUED | OUTPATIENT
Start: 2024-09-23 | End: 2024-09-26

## 2024-09-23 RX ORDER — FUROSEMIDE 10 MG/ML
40 INJECTION INTRAVENOUS ONCE
Refills: 0 | Status: COMPLETED | OUTPATIENT
Start: 2024-09-23 | End: 2024-09-23

## 2024-09-23 RX ADMIN — Medication 81 MILLIGRAM(S): at 11:45

## 2024-09-23 RX ADMIN — FUROSEMIDE 80 MILLIGRAM(S): 10 INJECTION INTRAVENOUS at 13:43

## 2024-09-23 RX ADMIN — FUROSEMIDE 40 MILLIGRAM(S): 10 INJECTION INTRAVENOUS at 07:06

## 2024-09-23 RX ADMIN — HYDRALAZINE HYDROCHLORIDE 25 MILLIGRAM(S): 100 TABLET ORAL at 16:00

## 2024-09-23 RX ADMIN — Medication 10 MILLIGRAM(S): at 11:45

## 2024-09-23 RX ADMIN — Medication 25 MILLIGRAM(S): at 17:17

## 2024-09-23 RX ADMIN — SACUBITRIL AND VALSARTAN 1 TABLET(S): 97; 103 TABLET, FILM COATED ORAL at 17:17

## 2024-09-23 NOTE — ED ADULT NURSE NOTE - OBJECTIVE STATEMENT
sob ,Saturation 49% RA hx CHF ,HTN pt on cardiac monitor tachy hypertensive BIPAP in place IPAP 12 02 60%

## 2024-09-23 NOTE — CONSULT NOTE ADULT - ASSESSMENT
49F with HFrEF htn DM, multiple recent hospitalizations for adhf p/w sob.   pt with known cardiomyopathy  mulitple prev admissions over past few weeks for ahdf  pt states med and dietary compliance at home, but unclear if this is accurate as she keeps returning with volume overload and hypoxic resp failure  per recent notes, pt has refused cMRI  pt now on bipap, still sob, although o2 sat has improved from admission  cont IV lasix, would increase to 80 iv bid for now  monitor lytes and replete as required  daily weights  recent tte reviewed  cont tele  cont entresto, bp control. pt may have had flash pulm edema 2/2 hypertensive episode at home  will follow with you

## 2024-09-23 NOTE — PROVIDER CONTACT NOTE (MEDICATION) - SITUATION
Pt's blood sugar before lunch was 270. Discussed with pt on getting insulin since in hospital use insulin. Pt refused insulin and stated before when in this hospital, MD ordered Tradjenta 5mg PO. Pt also takes Jardiance 25mg PO.

## 2024-09-23 NOTE — H&P ADULT - PROBLEM SELECTOR PLAN 2
worsening SOB, orthopnea  CXR  ( I personally review) with pul congestion > on right( similar to prior admission)  Bilateral crackles  Require BiPAP support  Clinically volume overloaded  IV lasix 80mg bid per card recommendation  only on oral lasix 20mg daily per chart, with current CKD, will need higher dose of lasix upon discharge  Closely monitor serum electrolytes given on IV diuresis  recent ECHO reviewed ( 09/2024)  patient is refusing dietitian consult for dietary education  Cardiology consulted

## 2024-09-23 NOTE — PATIENT PROFILE ADULT - FALL HARM RISK - HARM RISK INTERVENTIONS

## 2024-09-23 NOTE — H&P ADULT - PROBLEM SELECTOR PLAN 5
continue with basal 10 unit, premeal 3 unit tid, ISS, hypoglycemia protocol ( patient is refusing insulin currently)

## 2024-09-23 NOTE — ED ADULT NURSE REASSESSMENT NOTE - NS ED NURSE REASSESS COMMENT FT1
receive pt from outgoing rn. MISSY ,noted on the bipap. iv in Right AC . sinus on the monitor. no complaint at this time.

## 2024-09-23 NOTE — H&P ADULT - HISTORY OF PRESENT ILLNESS
49 years old female with h/o HTN, HLD, CKD, DM, HF ( systolic and diastolic) present to ED with complain of worsening SOB and orthopea. Patient has cough but has been there since recent admission and is unchanges. No fever, chills, runny nose or flu like symptoms. No chest pain. Patient reported drinking 1/2 gallon water daily ( not accounting other liquid intake)   Hypoxic to 49% at RA, require BiPAP support. WBC 11.46, plt 206, Cr 1.76, proBNP 6642, hsTnT 52.7. CXR with pul congestion > on right( similar to prior admission)

## 2024-09-23 NOTE — H&P ADULT - PROBLEM SELECTOR PROBLEM 5
Addended by: Lio Lower on: 3/16/2023 11:13 AM     Modules accepted: Orders
Type 2 diabetes mellitus with unspecified complications

## 2024-09-23 NOTE — ED PROVIDER NOTE - PHYSICAL EXAMINATION
General appearance: ill appearing, + respiratory distress, afebrile    Eyes: anicteric sclerae, PARI, EOMI   HENT: Atraumatic; oropharynx clear, MMM and no ulcerations, no pharyngeal erythema or exudate   Neck: Trachea midline; Full range of motion, supple   Pulm: Diffuse crackles with increased wob   CV: Tachycardic regular, No murmurs, rubs, or gallops   Abdomen: Soft, non-tender, non-distended; no guarding or rebound   Extremities: No peripheral edema, no gross deformities, FROM x4   Skin: Dry, normal temperature, turgor and texture; no rash   Psych: Appropriate affect, cooperative

## 2024-09-23 NOTE — PATIENT PROFILE ADULT - BILL PAYMENT
To Dr Camarena  Please see below,  OK to fit in at lunch today (other fit in could not make today) or refer to IC for eval, poss imaging and tx?    If fitting in would you like x ray L foot including toes?   no

## 2024-09-23 NOTE — ED PROVIDER NOTE - PROGRESS NOTE DETAILS
pt signed out  to me from Dr Lu, pt presented with sob and hypoxia, h/o chf, pt was recently admitted and discharged for the same symtpoms, pending labs, cxr and admission, pt is currently on bipap and stable

## 2024-09-23 NOTE — ED ADULT NURSE NOTE - NSFALLUNIVINTERV_ED_ALL_ED
Bed/Stretcher in lowest position, wheels locked, appropriate side rails in place/Call bell, personal items and telephone in reach/Instruct patient to call for assistance before getting out of bed/chair/stretcher/Non-slip footwear applied when patient is off stretcher/[place] to call system/Physically safe environment - no spills, clutter or unnecessary equipment/Purposeful proactive rounding/Room/bathroom lighting operational, light cord in reach

## 2024-09-23 NOTE — H&P ADULT - NSHPLABSRESULTS_GEN_ALL_CORE
ECHO 9/16/24  1. Left ventricular cavity is mildly dilated. Left ventricular systolic function is mildly decreased with an ejection fraction visually estimated at 45 to 50 %.   2. Severe left ventricular hypertrophy.   3. There is severe (grade 3) left ventricular diastolic dysfunction.   4. Normal right ventricular cavity size and mildly increased wall thickness,.   5. Left atrium is severely dilated.   6. Mild mitral valve leaflet calcification.   7. Mild mitral valve stenosis.   8. Mild to moderate mitral regurgitation.   9. Moderate tricuspid regurgitation.  10. Moderate aortic regurgitation.  11. Moderate pulmonic regurgitation.  12. Small pericardial effusion with no evidence of hemodynamic compromise (or echocardiographic evidence of cardiac tamponade). There is some RA collapse, but this lacks specificity and not diagnostic in itself.  13. Estimated pulmonary artery systolic pressure is 44 mmHg

## 2024-09-23 NOTE — ED PROVIDER NOTE - CLINICAL SUMMARY MEDICAL DECISION MAKING FREE TEXT BOX
48yo female with pmh dm, chf, htn, presenting with shortness of breath.  Feels like prior pulmonary edema.  No fever.  + cough.  Was just discharged 3 days ago for the same.  No abd pain, n/v/d, back pain, new edema.  EMS reports spo2 50s on RA.  Placed on bipap here with improvement.  + Crackles.  Likely chf.  No cp, doubt acs.  Doubt pe, pna.  Plan labs, bipap, xr, reassess.

## 2024-09-23 NOTE — CONSULT NOTE ADULT - SUBJECTIVE AND OBJECTIVE BOX
[place]  [id number]    Date of Consult:  9/23/24  CHIEF COMPLAINT:  sob  HISTORY OF PRESENT ILLNESS:  49F with HFrEF htn DM, multiple recent hospitalizations for adhf p/w sob. pt was recently here 9/15/24 with ADHF, hypoxic resp failure requiring bipap, d/c last week, also hospitalized early 09/24 with adhf at [facility] now returns with volume-overloaded htn and sob with hypoxic resp failure. pt states she felt well at d/c last week, was at home for a few days, states good med and dietary compliance. felt well until an acute onset last night of sob. pt placed on bipap in ED, inital o2 sat here was 90, now >95. pt remains on bipap. no edema. b/l crackles noted. pt denies cp syncope, palpitations. states sob at rest.       Allergies    lisinopril (Other)    Intolerances    	    MEDICATIONS:  amLODIPine   Tablet 10 milliGRAM(s) Oral daily  aspirin enteric coated 81 milliGRAM(s) Oral daily  carvedilol 25 milliGRAM(s) Oral every 12 hours  enoxaparin Injectable 40 milliGRAM(s) SubCutaneous every 12 hours  furosemide   Injectable 40 milliGRAM(s) IV Push two times a day  sacubitril 49 mG/valsartan 51 mG 1 Tablet(s) Oral two times a day        acetaminophen     Tablet .. 650 milliGRAM(s) Oral every 6 hours PRN  melatonin 3 milliGRAM(s) Oral at bedtime PRN      dextrose 50% Injectable 12.5 Gram(s) IV Push once  dextrose 50% Injectable 25 Gram(s) IV Push once  dextrose 50% Injectable 25 Gram(s) IV Push once  dextrose Oral Gel 15 Gram(s) Oral once PRN  glucagon  Injectable 1 milliGRAM(s) IntraMuscular once  insulin glargine Injectable (LANTUS) 10 Unit(s) SubCutaneous at bedtime  insulin lispro (ADMELOG) corrective regimen sliding scale   SubCutaneous three times a day before meals  insulin lispro (ADMELOG) corrective regimen sliding scale   SubCutaneous at bedtime  insulin lispro Injectable (ADMELOG) 3 Unit(s) SubCutaneous three times a day before meals  rosuvastatin 20 milliGRAM(s) Oral at bedtime    dextrose 5%. 1000 milliLiter(s) IV Continuous <Continuous>  dextrose 5%. 1000 milliLiter(s) IV Continuous <Continuous>      PAST MEDICAL & SURGICAL HISTORY:  Hypertension      Diabetes      Peptic ulcer disease      Chronic systolic congestive heart failure      No significant past surgical history          FAMILY HISTORY:  No pertinent family history in first degree relatives        SOCIAL HISTORY:    denies tob etoh drugs      REVIEW OF SYSTEMS:  See HPI. Otherwise, 10 point ROS done and otherwise negative.    PHYSICAL EXAM:  T(C): 37.1 (09-23-24 @ 10:45), Max: 37.1 (09-23-24 @ 07:48)  HR: 91 (09-23-24 @ 10:45) (91 - 132)  BP: 160/100 (09-23-24 @ 10:45) (152/104 - 160/100)  RR: 19 (09-23-24 @ 10:45) (18 - 32)  SpO2: 99% (09-23-24 @ 10:45) (90% - 100%)  Wt(kg): --  I&O's Summary      Appearance: Normal	  HEENT:   Normal oral mucosa, PERRL, EOMI	  Lymphatic: No lymphadenopathy  Cardiovascular: Normal S1 S2, No JVD, No murmurs, No edema  Respiratory: b/l crackles. bipap in place	  Psychiatry: A & O x 3, Mood & affect appropriate  Gastrointestinal:  Soft, Non-tender, + BS	  Skin: No rashes, No ecchymoses, No cyanosis	  Neurologic: Non-focal  Extremities: Normal range of motion      LABS:	 	    CBC Full  -  ( 23 Sep 2024 06:50 )  WBC Count : 11.46 K/uL  Hemoglobin : 14.0 g/dL  Hematocrit : 42.9 %  Platelet Count - Automated : 206 K/uL  Mean Cell Volume : 78.3 fl  Mean Cell Hemoglobin : 25.5 pg  Mean Cell Hemoglobin Concentration : 32.6 g/dL  Auto Neutrophil # : 7.87 K/uL  Auto Lymphocyte # : 3.02 K/uL  Auto Monocyte # : 0.49 K/uL  Auto Eosinophil # : 0.13 K/uL  Auto Basophil # : 0.04 K/uL  Auto Neutrophil % : 67.9 %  Auto Lymphocyte % : 26.0 %  Auto Monocyte % : 4.2 %  Auto Eosinophil % : 1.1 %  Auto Basophil % : 0.3 %    09-23    142  |  107  |  15  ----------------------------<  324[H]  3.8   |  26  |  1.76[H]    Ca    8.8      23 Sep 2024 06:50    TPro  8.0  /  Alb  3.2[L]  /  TBili  0.6  /  DBili  x   /  AST  28  /  ALT  47  /  AlkPhos  153[H]  09-23      proBNP:   Lipid Profile:   HgA1c:   TSH:       CARDIAC MARKERS:            TELEMETRY: 	    ECG:  	  RADIOLOGY:  OTHER: 	    PREVIOUS DIAGNOSTIC TESTING:    [ ] Echocardiogram:  [ ]  Catheterization:  [ ] Stress Test:  	  	  ASSESSMENT/PLAN: 	    Gurvinder Brooks MD

## 2024-09-23 NOTE — PROVIDER CONTACT NOTE (MEDICATION) - ASSESSMENT
No signs of distress noted. Pt is adamant on not getting insulin. Educated pt on insulin and needing to lower blood sugar; however, pt still refused

## 2024-09-23 NOTE — PATIENT PROFILE ADULT - FUNCTIONAL ASSESSMENT - BASIC MOBILITY 6.
3-calculated by average/Not able to assess (calculate score using [facility] averaging method)

## 2024-09-23 NOTE — H&P ADULT - NSHPPHYSICALEXAM_GEN_ALL_CORE
CONSTITUTIONAL: alert and cooperative, moderate respiratory distress  EYES: PERRL, , no scleral icterus  ENT: Mucosa moist, tongue normal  NECK: Neck supple, trachea midline, non-tender  CARDIAC: Normal S1 and S2. Regular rate and rhythms. No Pedal edema  LUNGS: Equal air entry both lungs. Bilateral crackles. Increase respiratory effort.   ABDOMEN: Soft, nondistended, nontender. No guarding or rebound tenderness. No hepatomegaly or splenomegaly. Bowel sound normal.   MUSCULOSKELETAL: Normocephalic, atraumatic. No significant deformity or joint abnormality  NEUROLOGICAL: No gross motor or sensory deficits  SKIN: no lesions or eruptions. Normal turgor  PSYCHIATRIC: A&O x 3, appropriate mood and affect

## 2024-09-24 LAB
A1C WITH ESTIMATED AVERAGE GLUCOSE RESULT: 10.4 % — HIGH (ref 4–5.6)
ALBUMIN SERPL ELPH-MCNC: 2.7 G/DL — LOW (ref 3.3–5)
ALBUMIN SERPL ELPH-MCNC: 2.9 G/DL — LOW (ref 3.3–5)
ALP SERPL-CCNC: 113 U/L — SIGNIFICANT CHANGE UP (ref 40–120)
ALP SERPL-CCNC: 116 U/L — SIGNIFICANT CHANGE UP (ref 40–120)
ALT FLD-CCNC: 33 U/L — SIGNIFICANT CHANGE UP (ref 12–78)
ALT FLD-CCNC: 37 U/L — SIGNIFICANT CHANGE UP (ref 12–78)
ANION GAP SERPL CALC-SCNC: 4 MMOL/L — LOW (ref 5–17)
ANION GAP SERPL CALC-SCNC: 6 MMOL/L — SIGNIFICANT CHANGE UP (ref 5–17)
ANION GAP SERPL CALC-SCNC: 6 MMOL/L — SIGNIFICANT CHANGE UP (ref 5–17)
AST SERPL-CCNC: 19 U/L — SIGNIFICANT CHANGE UP (ref 15–37)
AST SERPL-CCNC: 20 U/L — SIGNIFICANT CHANGE UP (ref 15–37)
BILIRUB SERPL-MCNC: 0.4 MG/DL — SIGNIFICANT CHANGE UP (ref 0.2–1.2)
BILIRUB SERPL-MCNC: 0.7 MG/DL — SIGNIFICANT CHANGE UP (ref 0.2–1.2)
BUN SERPL-MCNC: 20 MG/DL — SIGNIFICANT CHANGE UP (ref 7–23)
BUN SERPL-MCNC: 22 MG/DL — SIGNIFICANT CHANGE UP (ref 7–23)
BUN SERPL-MCNC: 23 MG/DL — SIGNIFICANT CHANGE UP (ref 7–23)
CALCIUM SERPL-MCNC: 8.5 MG/DL — SIGNIFICANT CHANGE UP (ref 8.5–10.1)
CALCIUM SERPL-MCNC: 8.7 MG/DL — SIGNIFICANT CHANGE UP (ref 8.5–10.1)
CALCIUM SERPL-MCNC: 8.8 MG/DL — SIGNIFICANT CHANGE UP (ref 8.5–10.1)
CHLORIDE SERPL-SCNC: 104 MMOL/L — SIGNIFICANT CHANGE UP (ref 96–108)
CHLORIDE SERPL-SCNC: 107 MMOL/L — SIGNIFICANT CHANGE UP (ref 96–108)
CHLORIDE SERPL-SCNC: 109 MMOL/L — HIGH (ref 96–108)
CO2 SERPL-SCNC: 29 MMOL/L — SIGNIFICANT CHANGE UP (ref 22–31)
CO2 SERPL-SCNC: 30 MMOL/L — SIGNIFICANT CHANGE UP (ref 22–31)
CO2 SERPL-SCNC: 32 MMOL/L — HIGH (ref 22–31)
CREAT SERPL-MCNC: 1.78 MG/DL — HIGH (ref 0.5–1.3)
CREAT SERPL-MCNC: 1.89 MG/DL — HIGH (ref 0.5–1.3)
CREAT SERPL-MCNC: 2.06 MG/DL — HIGH (ref 0.5–1.3)
EGFR: 29 ML/MIN/1.73M2 — LOW
EGFR: 32 ML/MIN/1.73M2 — LOW
EGFR: 35 ML/MIN/1.73M2 — LOW
ESTIMATED AVERAGE GLUCOSE: 252 MG/DL — HIGH (ref 68–114)
GLUCOSE BLDC GLUCOMTR-MCNC: 123 MG/DL — HIGH (ref 70–99)
GLUCOSE BLDC GLUCOMTR-MCNC: 196 MG/DL — HIGH (ref 70–99)
GLUCOSE BLDC GLUCOMTR-MCNC: 270 MG/DL — HIGH (ref 70–99)
GLUCOSE SERPL-MCNC: 160 MG/DL — HIGH (ref 70–99)
GLUCOSE SERPL-MCNC: 172 MG/DL — HIGH (ref 70–99)
GLUCOSE SERPL-MCNC: 219 MG/DL — HIGH (ref 70–99)
HCT VFR BLD CALC: 31.3 % — LOW (ref 34.5–45)
HCT VFR BLD CALC: 33.2 % — LOW (ref 34.5–45)
HGB BLD-MCNC: 10.8 G/DL — LOW (ref 11.5–15.5)
HGB BLD-MCNC: 11.2 G/DL — LOW (ref 11.5–15.5)
MAGNESIUM SERPL-MCNC: 1.9 MG/DL — SIGNIFICANT CHANGE UP (ref 1.6–2.6)
MAGNESIUM SERPL-MCNC: 1.9 MG/DL — SIGNIFICANT CHANGE UP (ref 1.6–2.6)
MCHC RBC-ENTMCNC: 25.3 PG — LOW (ref 27–34)
MCHC RBC-ENTMCNC: 26 PG — LOW (ref 27–34)
MCHC RBC-ENTMCNC: 33.7 G/DL — SIGNIFICANT CHANGE UP (ref 32–36)
MCHC RBC-ENTMCNC: 34.5 G/DL — SIGNIFICANT CHANGE UP (ref 32–36)
MCV RBC AUTO: 75.1 FL — LOW (ref 80–100)
MCV RBC AUTO: 75.4 FL — LOW (ref 80–100)
NRBC # BLD: 0 /100 WBCS — SIGNIFICANT CHANGE UP (ref 0–0)
NRBC # BLD: 0 /100 WBCS — SIGNIFICANT CHANGE UP (ref 0–0)
PHOSPHATE SERPL-MCNC: 2.8 MG/DL — SIGNIFICANT CHANGE UP (ref 2.5–4.5)
PHOSPHATE SERPL-MCNC: 2.9 MG/DL — SIGNIFICANT CHANGE UP (ref 2.5–4.5)
PLATELET # BLD AUTO: 196 K/UL — SIGNIFICANT CHANGE UP (ref 150–400)
PLATELET # BLD AUTO: 212 K/UL — SIGNIFICANT CHANGE UP (ref 150–400)
POTASSIUM SERPL-MCNC: 3.4 MMOL/L — LOW (ref 3.5–5.3)
POTASSIUM SERPL-MCNC: 3.5 MMOL/L — SIGNIFICANT CHANGE UP (ref 3.5–5.3)
POTASSIUM SERPL-MCNC: 3.6 MMOL/L — SIGNIFICANT CHANGE UP (ref 3.5–5.3)
POTASSIUM SERPL-SCNC: 3.4 MMOL/L — LOW (ref 3.5–5.3)
POTASSIUM SERPL-SCNC: 3.5 MMOL/L — SIGNIFICANT CHANGE UP (ref 3.5–5.3)
POTASSIUM SERPL-SCNC: 3.6 MMOL/L — SIGNIFICANT CHANGE UP (ref 3.5–5.3)
PROT SERPL-MCNC: 6.8 GM/DL — SIGNIFICANT CHANGE UP (ref 6–8.3)
PROT SERPL-MCNC: 6.8 GM/DL — SIGNIFICANT CHANGE UP (ref 6–8.3)
RBC # BLD: 4.15 M/UL — SIGNIFICANT CHANGE UP (ref 3.8–5.2)
RBC # BLD: 4.42 M/UL — SIGNIFICANT CHANGE UP (ref 3.8–5.2)
RBC # FLD: 15.9 % — HIGH (ref 10.3–14.5)
RBC # FLD: 15.9 % — HIGH (ref 10.3–14.5)
SODIUM SERPL-SCNC: 142 MMOL/L — SIGNIFICANT CHANGE UP (ref 135–145)
SODIUM SERPL-SCNC: 142 MMOL/L — SIGNIFICANT CHANGE UP (ref 135–145)
SODIUM SERPL-SCNC: 143 MMOL/L — SIGNIFICANT CHANGE UP (ref 135–145)
WBC # BLD: 10.74 K/UL — HIGH (ref 3.8–10.5)
WBC # BLD: 9.81 K/UL — SIGNIFICANT CHANGE UP (ref 3.8–10.5)
WBC # FLD AUTO: 10.74 K/UL — HIGH (ref 3.8–10.5)
WBC # FLD AUTO: 9.81 K/UL — SIGNIFICANT CHANGE UP (ref 3.8–10.5)

## 2024-09-24 PROCEDURE — 99233 SBSQ HOSP IP/OBS HIGH 50: CPT

## 2024-09-24 RX ORDER — ENOXAPARIN SODIUM 150 MG/ML
40 INJECTION SUBCUTANEOUS EVERY 24 HOURS
Refills: 0 | Status: DISCONTINUED | OUTPATIENT
Start: 2024-09-24 | End: 2024-09-26

## 2024-09-24 RX ORDER — LINAGLIPTIN 5 MG/1
5 TABLET, FILM COATED ORAL DAILY
Refills: 0 | Status: DISCONTINUED | OUTPATIENT
Start: 2024-09-24 | End: 2024-09-26

## 2024-09-24 RX ADMIN — Medication 10 MILLIGRAM(S): at 05:29

## 2024-09-24 RX ADMIN — LINAGLIPTIN 5 MILLIGRAM(S): 5 TABLET, FILM COATED ORAL at 12:53

## 2024-09-24 RX ADMIN — FUROSEMIDE 80 MILLIGRAM(S): 10 INJECTION INTRAVENOUS at 05:29

## 2024-09-24 RX ADMIN — Medication 81 MILLIGRAM(S): at 11:08

## 2024-09-24 RX ADMIN — FUROSEMIDE 80 MILLIGRAM(S): 10 INJECTION INTRAVENOUS at 14:50

## 2024-09-24 RX ADMIN — SACUBITRIL AND VALSARTAN 1 TABLET(S): 97; 103 TABLET, FILM COATED ORAL at 05:29

## 2024-09-24 RX ADMIN — Medication 25 MILLIGRAM(S): at 05:29

## 2024-09-24 NOTE — PROGRESS NOTE ADULT - SUBJECTIVE AND OBJECTIVE BOX
Patient is a 49y old  Female who presents with a chief complaint of sob    PAST MEDICAL & SURGICAL HISTORY:  Hypertension    Diabetes    Smoking hx     CKD    Peptic ulcer disease    congestive heart failure    INTERVAL HISTORY: in no acute distress, admits to HAINES, no cp  	  MEDICATIONS:  MEDICATIONS  (STANDING):  amLODIPine   Tablet 10 milliGRAM(s) Oral daily  aspirin enteric coated 81 milliGRAM(s) Oral daily  carvedilol 25 milliGRAM(s) Oral every 12 hours  enoxaparin Injectable 40 milliGRAM(s) SubCutaneous every 24 hours  furosemide   Injectable 80 milliGRAM(s) IV Push two times a day  linagliptin 5 milliGRAM(s) Oral daily  rosuvastatin 20 milliGRAM(s) Oral at bedtime  sacubitril 49 mG/valsartan 51 mG 1 Tablet(s) Oral two times a day    MEDICATIONS  (PRN):  acetaminophen     Tablet .. 650 milliGRAM(s) Oral every 6 hours PRN Mild Pain (1 - 3), Moderate Pain (4 - 6)  dextrose Oral Gel 15 Gram(s) Oral once PRN Blood Glucose LESS THAN 70 milliGRAM(s)/deciliter  melatonin 3 milliGRAM(s) Oral at bedtime PRN Insomnia    Vitals:  T(F): 98.3 (09-24-24 @ 11:21), Max: 98.3 (09-24-24 @ 04:45)  HR: 78 (09-24-24 @ 11:21) (72 - 100)  BP: 137/82 (09-24-24 @ 11:21) (137/82 - 168/107)  RR: 18 (09-24-24 @ 11:21) (18 - 19)  SpO2: 99% (09-24-24 @ 11:21) (97% - 100%)    09-23 @ 07:01  -  09-24 @ 07:00  --------------------------------------------------------  IN:    Oral Fluid: 720 mL  Total IN: 720 mL    OUT:  Total OUT: 0 mL    Total NET: 720 mL    Weight (kg): 99.3 (09-23 @ 21:03)  BMI (kg/m2): 34.3 (09-23 @ 21:03)    PHYSICAL EXAM:  Neuro: Awake, responsive  CV: S1 S2 RRR +SM  Lungs: rales to bases   GI: Soft, BS +, ND, NT  Extremities: Trace LE edema    TELEMETRY: sinus     RADIOLOGY: < from: Xray Chest 1 View- PORTABLE-Urgent (Xray Chest 1 View- PORTABLE-Urgent .) (09.23.24 @ 09:04) >    IMPRESSION: Persistent bilateral infiltrates right greater than left.    < end of copied text >    DIAGNOSTIC TESTING:    [x ] Echocardiogram: < from: TTE Echo Complete w/o Contrast w/ Doppler (09.16.24 @ 10:17) >   1. Left ventricular cavity is mildly dilated. Left ventricular systolic   function is mildly decreased with an ejection fraction visually estimated   at 45 to 50 %.   2. Severe left ventricular hypertrophy.   3. There is severe (grade 3) left ventricular diastolic dysfunction.   4. Normal right ventricular cavity size and mildly increased wall   thickness,.   5. Left atrium is severely dilated.   6. Mild mitral valve leaflet calcification.   7. Mild mitral valve stenosis.   8. Mild to moderate mitral regurgitation.   9. Moderate tricuspid regurgitation.  10. Moderate aortic regurgitation.  11. Moderate pulmonic regurgitation.  12. Small pericardial effusion with no evidence of hemodynamic compromise   (or echocardiographic evidence of cardiac tamponade). There is some RA   collapse, but this lacks specificity and not diagnostic in itself.  13. Estimated pulmonary artery systolic pressure is 44 mmHg.    < end of copied text >    LABS:	 	    CARDIAC MARKERS:  Troponin I, High Sensitivity Result: 52.7 ng/L (09-23 @ 06:50)    24 Sep 2024 07:50    143    |  107    |  20     ----------------------------<  172    3.4     |  30     |  1.78   24 Sep 2024 00:05    142    |  109    |  23     ----------------------------<  219    3.5     |  29     |  1.89   23 Sep 2024 06:50    142    |  107    |  15     ----------------------------<  324    3.8     |  26     |  1.76     Ca    8.5        24 Sep 2024 07:50  Phos  2.9       24 Sep 2024 07:50  Mg     1.9       24 Sep 2024 07:50    TPro  6.8    /  Alb  2.7    /  TBili  0.7    /  DBili  x      /  AST  19     /  ALT  33     /  AlkPhos  113    24 Sep 2024 07:50                       10.8   10.74 )-----------( 196      ( 24 Sep 2024 07:50 )             31.3 ,                       14.0   11.46 )-----------( 206      ( 23 Sep 2024 06:50 )             42.9   pro-BNP: Pro-Brain Natriuretic Peptide: 6642 pg/mL (09.23.24 @ 06:50)

## 2024-09-24 NOTE — PROGRESS NOTE ADULT - ASSESSMENT
49 years old female with h/o HTN, HLD, CKD, DM, HF ( systolic and diastolic) present to ED with complain of worsening SOB and orthopea. Patient has cough but has been there since recent admission and is unchanges. No fever, chills, runny nose or flu like symptoms. No chest pain. Patient reported drinking 1/2 gallon water daily ( not accounting other liquid intake)   Hypoxic to 49% at RA, require BiPAP support. WBC 11.46, plt 206, Cr 1.76, proBNP 6642, hsTnT 52.7. CXR with pul congestion > on right( similar to prior admission)    ##Acute respiratory failure with hypoxia.   ##Acute on chronic combined systolic and diastolic congestive heart failure.   Worsening SOB, orthopnea. CXR with pul congestion > on right( similar to prior admission). On exam, bilateral crackles. Clinically volume overloaded. Echo from 09/16 with reduced EF. BNP 6642.   - C/w IV lasix 80mg bid per card recommendation  - GDMT: Entresto , Coreg   - patient is refusing dietitian consult for dietary education  - Cardiology following     # Benign essential HTN.   ·  Plan: monitor BP and titrate BP med.    ##Hyperlipidemia, unspecified.   ·  Plan: continue statin.    ## Type 2 diabetes mellitus with unspecified complications.   - C/w basal 10 unit, premeal 3 unit tid, ISS, hypoglycemia protocol   - patient is refusing insulin     #Acute on Chronic kidney disease (CKD).   ·  Cr 1.89 C/w Diuresis, Cr around baseline    #Obesity   BMI 32. encourage diet and exercise.     Diet: DASH  DVT prophylaxis: Lovenox   Dispo: Tele   Code Status: FC     I have spent a total of *** minutes to prepare to see the patient, obtaining and reviewing history, physical examination, explaining the diagnosis, prognosis and treatment plan with the patient/family/caregiver. I also have spent the time ordering studies and testing, interpreting results, medicine reconciliation, IDRs, subspecialty consultation and documentation as above. 49 years old female with h/o HTN, HLD, CKD, DM, HF ( systolic and diastolic) present to ED with complain of worsening SOB and orthopea. Patient has cough but has been there since recent admission and is unchanges. No fever, chills, runny nose or flu like symptoms. No chest pain. Patient reported drinking 1/2 gallon water daily ( not accounting other liquid intake)   Hypoxic to 49% at RA, require BiPAP support. WBC 11.46, plt 206, Cr 1.76, proBNP 6642, hsTnT 52.7. CXR with pul congestion > on right( similar to prior admission)    ##Acute respiratory failure with hypoxia.   ##Acute on chronic combined systolic and diastolic congestive heart failure.   Worsening SOB, orthopnea. CXR with pul congestion > on right( similar to prior admission). On exam, bilateral crackles. Clinically volume overloaded. Echo from 09/16 with reduced EF. BNP 6642.   - C/w IV lasix 80mg bid per card recommendation  - GDMT: Entresto , Coreg   - patient is refusing dietitian consult for dietary education  - Cardiology following     # Benign essential HTN.   ·  Plan: monitor BP and titrate BP med.    ##Hyperlipidemia, unspecified.   ·  Plan: continue statin.    ## Type 2 diabetes mellitus with unspecified complications.   - refusing insulin. Will start her PO Antidiabetics     #Acute on Chronic kidney disease (CKD).   ·  Cr 1.89 C/w Diuresis, Cr around baseline    #Obesity   BMI 32. encourage diet and exercise.     Diet: DASH  DVT prophylaxis: Lovenox   Dispo: Tele   Code Status: FC     I have spent a total of 50 minutes to prepare to see the patient, obtaining and reviewing history, physical examination, explaining the diagnosis, prognosis and treatment plan with the patient/family/caregiver. I also have spent the time ordering studies and testing, interpreting results, medicine reconciliation, IDRs, subspecialty consultation and documentation as above.

## 2024-09-24 NOTE — PROGRESS NOTE ADULT - ASSESSMENT
49F with HFrEF htn DM, multiple recent hospitalizations for adhf p/w sob.   pt with known cardiomyopathy  multiple prev admissions over past few weeks for ahdf  pt states med and dietary compliance at home, but unclear if this is accurate as she keeps returning with volume overload and hypoxic resp failure.  Pt requesting to have O2 upon discharge, had same request last admission   per recent notes, pt has refused cMRI  BNP 6600    Cont on supplemental o2, BiPAP as needed  cont on IV lasix 80 iv bid for now  monitor lytes and replete as required, monitor renal function   daily weights  recent TTE reviewed  cont tele  cont entresto, coreg, bp control. pt may have had flash pulm edema 2/2 hypertensive episode at home  will follow with you

## 2024-09-24 NOTE — PROGRESS NOTE ADULT - SUBJECTIVE AND OBJECTIVE BOX
Hospitalist Daily Progress Note     *SUBJECTIVE*    Interval Events:  NAEON, Resting comfortably. HD Stable.      *OBJECTIVE*    PHYSICAL EXAM:  CONSTITUTIONAL: alert and cooperative, moderate respiratory distress  EYES: PERRL, , no scleral icterus  ENT: Mucosa moist, tongue normal  NECK: Neck supple, trachea midline, non-tender  CARDIAC: Normal S1 and S2. Regular rate and rhythms. No Pedal edema  LUNGS: Equal air entry both lungs. Bilateral crackles. Increase respiratory effort.   ABDOMEN: Soft, nondistended, nontender. No guarding or rebound tenderness. No hepatomegaly or splenomegaly. Bowel sound normal.   MUSCULOSKELETAL: Normocephalic, atraumatic. No significant deformity or joint abnormality  NEUROLOGICAL: No gross motor or sensory deficits  SKIN: no lesions or eruptions. Normal turgor  PSYCHIATRIC: A&O x 3, appropriate mood and affect    OBJECTIVE DATA:   Vital Signs Last 24 Hrs  T(C): 36.8 (24 Sep 2024 04:45), Max: 37.1 (23 Sep 2024 10:45)  T(F): 98.3 (24 Sep 2024 04:45), Max: 98.7 (23 Sep 2024 10:45)  HR: 83 (24 Sep 2024 05:37) (83 - 100)  BP: 153/94 (24 Sep 2024 04:45) (139/105 - 169/95)  BP(mean): --  RR: 18 (24 Sep 2024 04:45) (14 - 19)  SpO2: 97% (24 Sep 2024 05:37) (95% - 100%)    Parameters below as of 24 Sep 2024 04:45  Patient On (Oxygen Delivery Method): nasal cannula  O2 Flow (L/min): 2             Daily     Daily     Labs, Interval Radiology studies, Medications reviewed by me         Hospitalist Daily Progress Note     *SUBJECTIVE*    Interval Events:  NAEON, Upset     *OBJECTIVE*    PHYSICAL EXAM:  CONSTITUTIONAL: alert and cooperative, moderate respiratory distress  CARDIAC: Normal S1 and S2. Regular rate and rhythms. No Pedal edema  LUNGS: Equal air entry both lungs. Bilateral crackles. Increase respiratory effort.   ABDOMEN: Soft, nondistended, nontender. No guarding or rebound tenderness. No hepatomegaly or splenomegaly. Bowel sound normal.     OBJECTIVE DATA:   Vital Signs Last 24 Hrs  T(C): 36.8 (24 Sep 2024 04:45), Max: 37.1 (23 Sep 2024 10:45)  T(F): 98.3 (24 Sep 2024 04:45), Max: 98.7 (23 Sep 2024 10:45)  HR: 83 (24 Sep 2024 05:37) (83 - 100)  BP: 153/94 (24 Sep 2024 04:45) (139/105 - 169/95)  BP(mean): --  RR: 18 (24 Sep 2024 04:45) (14 - 19)  SpO2: 97% (24 Sep 2024 05:37) (95% - 100%)    Parameters below as of 24 Sep 2024 04:45  Patient On (Oxygen Delivery Method): nasal cannula  O2 Flow (L/min): 2             Daily     Daily     Labs, Interval Radiology studies, Medications reviewed by me

## 2024-09-25 LAB
ANION GAP SERPL CALC-SCNC: 8 MMOL/L — SIGNIFICANT CHANGE UP (ref 5–17)
BASOPHILS # BLD AUTO: 0.02 K/UL — SIGNIFICANT CHANGE UP (ref 0–0.2)
BASOPHILS NFR BLD AUTO: 0.2 % — SIGNIFICANT CHANGE UP (ref 0–2)
BUN SERPL-MCNC: 22 MG/DL — SIGNIFICANT CHANGE UP (ref 7–23)
CALCIUM SERPL-MCNC: 8.5 MG/DL — SIGNIFICANT CHANGE UP (ref 8.5–10.1)
CHLORIDE SERPL-SCNC: 105 MMOL/L — SIGNIFICANT CHANGE UP (ref 96–108)
CO2 SERPL-SCNC: 28 MMOL/L — SIGNIFICANT CHANGE UP (ref 22–31)
CREAT SERPL-MCNC: 1.76 MG/DL — HIGH (ref 0.5–1.3)
EGFR: 35 ML/MIN/1.73M2 — LOW
EOSINOPHIL # BLD AUTO: 0.14 K/UL — SIGNIFICANT CHANGE UP (ref 0–0.5)
EOSINOPHIL NFR BLD AUTO: 1.4 % — SIGNIFICANT CHANGE UP (ref 0–6)
GLUCOSE SERPL-MCNC: 118 MG/DL — HIGH (ref 70–99)
HCT VFR BLD CALC: 33.8 % — LOW (ref 34.5–45)
HGB BLD-MCNC: 11.5 G/DL — SIGNIFICANT CHANGE UP (ref 11.5–15.5)
IMM GRANULOCYTES NFR BLD AUTO: 0.3 % — SIGNIFICANT CHANGE UP (ref 0–0.9)
LYMPHOCYTES # BLD AUTO: 3.1 K/UL — SIGNIFICANT CHANGE UP (ref 1–3.3)
LYMPHOCYTES # BLD AUTO: 30.8 % — SIGNIFICANT CHANGE UP (ref 13–44)
MAGNESIUM SERPL-MCNC: 2 MG/DL — SIGNIFICANT CHANGE UP (ref 1.6–2.6)
MCHC RBC-ENTMCNC: 25.8 PG — LOW (ref 27–34)
MCHC RBC-ENTMCNC: 34 G/DL — SIGNIFICANT CHANGE UP (ref 32–36)
MCV RBC AUTO: 76 FL — LOW (ref 80–100)
MONOCYTES # BLD AUTO: 0.65 K/UL — SIGNIFICANT CHANGE UP (ref 0–0.9)
MONOCYTES NFR BLD AUTO: 6.5 % — SIGNIFICANT CHANGE UP (ref 2–14)
NEUTROPHILS # BLD AUTO: 6.13 K/UL — SIGNIFICANT CHANGE UP (ref 1.8–7.4)
NEUTROPHILS NFR BLD AUTO: 60.8 % — SIGNIFICANT CHANGE UP (ref 43–77)
NRBC # BLD: 0 /100 WBCS — SIGNIFICANT CHANGE UP (ref 0–0)
NT-PROBNP SERPL-SCNC: 4505 PG/ML — HIGH (ref 0–125)
PLATELET # BLD AUTO: 192 K/UL — SIGNIFICANT CHANGE UP (ref 150–400)
POTASSIUM SERPL-MCNC: 3.3 MMOL/L — LOW (ref 3.5–5.3)
POTASSIUM SERPL-SCNC: 3.3 MMOL/L — LOW (ref 3.5–5.3)
RBC # BLD: 4.45 M/UL — SIGNIFICANT CHANGE UP (ref 3.8–5.2)
RBC # FLD: 15.5 % — HIGH (ref 10.3–14.5)
SODIUM SERPL-SCNC: 141 MMOL/L — SIGNIFICANT CHANGE UP (ref 135–145)
WBC # BLD: 10.07 K/UL — SIGNIFICANT CHANGE UP (ref 3.8–10.5)
WBC # FLD AUTO: 10.07 K/UL — SIGNIFICANT CHANGE UP (ref 3.8–10.5)

## 2024-09-25 PROCEDURE — 99232 SBSQ HOSP IP/OBS MODERATE 35: CPT

## 2024-09-25 PROCEDURE — 99233 SBSQ HOSP IP/OBS HIGH 50: CPT

## 2024-09-25 RX ORDER — FUROSEMIDE 10 MG/ML
40 INJECTION INTRAVENOUS
Refills: 0 | Status: DISCONTINUED | OUTPATIENT
Start: 2024-09-26 | End: 2024-09-26

## 2024-09-25 RX ADMIN — Medication 25 MILLIGRAM(S): at 06:01

## 2024-09-25 RX ADMIN — SACUBITRIL AND VALSARTAN 1 TABLET(S): 97; 103 TABLET, FILM COATED ORAL at 06:01

## 2024-09-25 RX ADMIN — FUROSEMIDE 80 MILLIGRAM(S): 10 INJECTION INTRAVENOUS at 13:24

## 2024-09-25 RX ADMIN — Medication 81 MILLIGRAM(S): at 11:42

## 2024-09-25 RX ADMIN — FUROSEMIDE 80 MILLIGRAM(S): 10 INJECTION INTRAVENOUS at 06:01

## 2024-09-25 RX ADMIN — Medication 40 MILLIEQUIVALENT(S): at 17:32

## 2024-09-25 RX ADMIN — Medication 40 MILLIEQUIVALENT(S): at 21:47

## 2024-09-25 RX ADMIN — Medication 40 MILLIEQUIVALENT(S): at 08:53

## 2024-09-25 RX ADMIN — LINAGLIPTIN 5 MILLIGRAM(S): 5 TABLET, FILM COATED ORAL at 11:42

## 2024-09-25 RX ADMIN — Medication 25 MILLIGRAM(S): at 17:32

## 2024-09-25 RX ADMIN — Medication 10 MILLIGRAM(S): at 06:01

## 2024-09-25 RX ADMIN — SACUBITRIL AND VALSARTAN 1 TABLET(S): 97; 103 TABLET, FILM COATED ORAL at 17:32

## 2024-09-25 NOTE — DIETITIAN INITIAL EVALUATION ADULT - ETIOLOGY
denial of need to change/perception that time, interpersonal, or financial constraints prevent changes

## 2024-09-25 NOTE — DIETITIAN INITIAL EVALUATION ADULT - PERTINENT MEDS FT
MEDICATIONS  (STANDING):  amLODIPine   Tablet 10 milliGRAM(s) Oral daily  aspirin enteric coated 81 milliGRAM(s) Oral daily  carvedilol 25 milliGRAM(s) Oral every 12 hours  dextrose 5%. 1000 milliLiter(s) (50 mL/Hr) IV Continuous <Continuous>  dextrose 5%. 1000 milliLiter(s) (100 mL/Hr) IV Continuous <Continuous>  dextrose 50% Injectable 12.5 Gram(s) IV Push once  dextrose 50% Injectable 25 Gram(s) IV Push once  dextrose 50% Injectable 25 Gram(s) IV Push once  enoxaparin Injectable 40 milliGRAM(s) SubCutaneous every 24 hours  furosemide   Injectable 80 milliGRAM(s) IV Push two times a day  glucagon  Injectable 1 milliGRAM(s) IntraMuscular once  linagliptin 5 milliGRAM(s) Oral daily  potassium chloride    Tablet ER 40 milliEquivalent(s) Oral every 4 hours  rosuvastatin 20 milliGRAM(s) Oral at bedtime  sacubitril 49 mG/valsartan 51 mG 1 Tablet(s) Oral two times a day    MEDICATIONS  (PRN):  acetaminophen     Tablet .. 650 milliGRAM(s) Oral every 6 hours PRN Mild Pain (1 - 3), Moderate Pain (4 - 6)  dextrose Oral Gel 15 Gram(s) Oral once PRN Blood Glucose LESS THAN 70 milliGRAM(s)/deciliter  melatonin 3 milliGRAM(s) Oral at bedtime PRN Insomnia

## 2024-09-25 NOTE — DIETITIAN INITIAL EVALUATION ADULT - PROBLEM SELECTOR PLAN 1
Hypoxic to 49% at RA, require BiPAP support  CXR  ( I personally review) with pul congestion > on right( similar to prior admission)  Bilateral crackles + and elevated BNP  due to CHF  Continue IV lasix, closely monitor respiratory status and wean off BiPAP as tolerate  Check RVP, procalcitonin  CT chest

## 2024-09-25 NOTE — DIETITIAN INITIAL EVALUATION ADULT - NS FNS DIET ORDER
SUBJECTIVE:      Patient ID: Reina Bahena is a 64 y.o. female.    Chief Complaint: Insomnia (3 month f/u Insomnia )    Presents for insomnia - states she did not feel the CR ambien worked any better at keeping her asleep than her 10 mg ambien, she is amenable to trying something else    Discussed outstanding health maintenance     Hyperlipidemia  This is a chronic problem. The current episode started more than 1 year ago. The problem is controlled. Recent lipid tests were reviewed and are high (LDL). Factors aggravating her hyperlipidemia include smoking. Associated symptoms include myalgias and shortness of breath (at times). Pertinent negatives include no chest pain. Current antihyperlipidemic treatment includes statins. Compliance problems include adherence to exercise.  Risk factors for coronary artery disease include dyslipidemia, post-menopausal and a sedentary lifestyle.     Past Surgical History:   Procedure Laterality Date    HYSTERECTOMY  1986     Family History   Problem Relation Age of Onset    Cancer Father         Colon cancer     Cancer Maternal Aunt         breast cancer    Breast cancer Maternal Aunt     No Known Problems Sister     No Known Problems Maternal Grandmother     No Known Problems Maternal Grandfather     No Known Problems Paternal Grandmother     No Known Problems Paternal Grandfather       Social History     Socioeconomic History    Marital status:    Occupational History     Employer: [employer]   Tobacco Use    Smoking status: Light Smoker     Packs/day: 0.75     Years: 40.00     Pack years: 30.00     Types: Cigarettes     Start date: 8/6/1978    Smokeless tobacco: Never   Substance and Sexual Activity    Alcohol use: No    Drug use: No   Social History Narrative    Not in contact with maternal side of family     Current Outpatient Medications   Medication Sig Dispense Refill    ciprofloxacin HCl (CIPRO) 500 MG tablet Take 500 mg by mouth 2 (two) times  daily.      dexAMETHasone (DECADRON) 2 MG tablet Take by mouth.      diazePAM (VALIUM) 5 MG tablet Take 5 mg by mouth 2 (two) times daily as needed.      HYDROcodone-acetaminophen (NORCO)  mg per tablet Take 1 tablet by mouth 2 (two) times daily as needed.      levETIRAcetam (KEPPRA) 500 MG Tab       methocarbamoL (ROBAXIN) 750 MG Tab Take 750 mg by mouth every evening.      multivitamin capsule Take 1 capsule by mouth once daily.      ondansetron (ZOFRAN) 8 MG tablet Take 8 mg by mouth 3 (three) times daily.      tiotropium-olodateroL (STIOLTO RESPIMAT) 2.5-2.5 mcg/actuation Mist Inhale 2 puffs into the lungs once daily. Controller 16 g 0    albuterol (PROVENTIL/VENTOLIN HFA) 90 mcg/actuation inhaler Inhale 1-2 puffs into the lungs every 4 to 6 hours as needed for Wheezing or Shortness of Breath. 18 g 2    amitriptyline (ELAVIL) 25 MG tablet Take 1 tablet (25 mg total) by mouth nightly. 90 tablet 1    eszopiclone (LUNESTA) 3 mg Tab Take 1 tablet (3 mg total) by mouth every evening. 30 tablet 2    pravastatin (PRAVACHOL) 40 MG tablet Take 1 tablet (40 mg total) by mouth once daily. 90 tablet 1     No current facility-administered medications for this visit.     Review of patient's allergies indicates:   Allergen Reactions    Penicillins      Hives        Past Medical History:   Diagnosis Date    Arthritis     Emphysema/COPD     Hyperlipidemia     Insomnia      Past Surgical History:   Procedure Laterality Date    HYSTERECTOMY  1986       Review of Systems   Constitutional:  Positive for unexpected weight change (loss). Negative for activity change, appetite change and fatigue.   HENT:  Negative for congestion, ear pain, hearing loss, mouth sores, postnasal drip, rhinorrhea, sinus pressure, sinus pain, sneezing, sore throat and trouble swallowing.    Eyes:  Negative for photophobia, pain, discharge and visual disturbance.   Respiratory:  Positive for shortness of breath (at times) and wheezing (at times).  "Negative for cough and chest tightness.    Cardiovascular:  Negative for chest pain, palpitations and leg swelling.   Gastrointestinal:  Negative for abdominal distention, abdominal pain, blood in stool, constipation, diarrhea, nausea and vomiting.   Endocrine: Negative for cold intolerance, heat intolerance, polydipsia and polyuria.   Genitourinary:  Negative for difficulty urinating, dysuria, flank pain, frequency, hematuria, menstrual problem, pelvic pain and urgency.   Musculoskeletal:  Positive for back pain, myalgias and neck pain. Negative for arthralgias and joint swelling.   Skin:  Negative for pallor and rash.   Allergic/Immunologic: Negative for environmental allergies and food allergies.   Neurological:  Positive for speech difficulty (trouble getting her words out) and headaches. Negative for dizziness, weakness, light-headedness and numbness.   Hematological:  Does not bruise/bleed easily.   Psychiatric/Behavioral:  Positive for decreased concentration and sleep disturbance. Negative for agitation, confusion and dysphoric mood. The patient is not nervous/anxious.     OBJECTIVE:      Vitals:    12/22/22 0834   BP: 114/74   BP Location: Right arm   Patient Position: Sitting   BP Method: Medium (Manual)   Pulse: 86   SpO2: 99%   Weight: 46.9 kg (103 lb 8 oz)   Height: 5' 2" (1.575 m)     Physical Exam  Vitals and nursing note reviewed.   Constitutional:       General: She is not in acute distress.     Appearance: Normal appearance. She is well-developed and normal weight.      Comments: 5# loss since September visit   HENT:      Head: Normocephalic and atraumatic.      Right Ear: Hearing normal.      Left Ear: Hearing normal.      Nose: Nose normal. No rhinorrhea.      Mouth/Throat:      Mouth: No oral lesions.      Dentition: No dental caries or dental abscesses.      Pharynx: Uvula midline.   Eyes:      General: Lids are normal.         Right eye: No discharge.         Left eye: No discharge.      " Conjunctiva/sclera: Conjunctivae normal.      Right eye: Right conjunctiva is not injected.      Left eye: Left conjunctiva is not injected.      Pupils: Pupils are equal, round, and reactive to light. Pupils are equal.      Right eye: Pupil is round and reactive.      Left eye: Pupil is round and reactive.   Neck:      Thyroid: No thyromegaly.      Vascular: No JVD.      Trachea: Trachea normal. No tracheal deviation.   Cardiovascular:      Rate and Rhythm: Normal rate and regular rhythm.      Pulses:           Radial pulses are 2+ on the right side and 2+ on the left side.      Heart sounds: Normal heart sounds. No murmur heard.    No friction rub. No gallop.   Pulmonary:      Effort: Pulmonary effort is normal. No respiratory distress.      Breath sounds: Normal breath sounds. No stridor. No decreased breath sounds, wheezing, rhonchi or rales.   Abdominal:      General: Bowel sounds are normal. There is no distension.      Palpations: Abdomen is soft. Abdomen is not rigid.      Tenderness: There is no abdominal tenderness. There is no guarding.   Musculoskeletal:         General: Normal range of motion.      Cervical back: Normal range of motion and neck supple.   Lymphadenopathy:      Cervical: No cervical adenopathy.   Skin:     General: Skin is warm and dry.      Capillary Refill: Capillary refill takes less than 2 seconds.      Coloration: Skin is not pale.      Findings: No lesion.   Neurological:      Mental Status: She is alert and oriented to person, place, and time.      Motor: No atrophy.      Coordination: Coordination normal.      Gait: Gait normal.   Psychiatric:         Attention and Perception: She is attentive.         Speech: Speech normal.         Behavior: Behavior normal.         Thought Content: Thought content normal.         Judgment: Judgment normal.      Assessment:       1. Primary insomnia    2. Mixed hyperlipidemia    3. Simple chronic bronchitis        Plan:       Primary insomnia  -      amitriptyline (ELAVIL) 25 MG tablet; Take 1 tablet (25 mg total) by mouth nightly.  Dispense: 90 tablet; Refill: 1  -     eszopiclone (LUNESTA) 3 mg Tab; Take 1 tablet (3 mg total) by mouth every evening.  Dispense: 30 tablet; Refill: 2    Mixed hyperlipidemia  -     pravastatin (PRAVACHOL) 40 MG tablet; Take 1 tablet (40 mg total) by mouth once daily.  Dispense: 90 tablet; Refill: 1  -     CBC Auto Differential; Future; Expected date: 12/29/2022  -     Comprehensive Metabolic Panel; Future; Expected date: 12/22/2022  -     Lipid Panel; Future; Expected date: 12/22/2022  -     TSH; Future; Expected date: 12/22/2022    Simple chronic bronchitis  -     albuterol (PROVENTIL/VENTOLIN HFA) 90 mcg/actuation inhaler; Inhale 1-2 puffs into the lungs every 4 to 6 hours as needed for Wheezing or Shortness of Breath.  Dispense: 18 g; Refill: 2          Follow up in about 3 months (around 3/22/2023) for insomnia.      12/22/2022 Terrence Crandall, SINGH, FNP-C                   Diet, DASH/TLC:   Sodium & Cholesterol Restricted  Consistent Carbohydrate {No Snacks}  1500mL Fluid Restriction ([id number])     Special Instructions for Nursin milliLiter(s) to 2000 milliLiter(s) fluid restriction (24 @ 10:42) [Active]

## 2024-09-25 NOTE — PROGRESS NOTE ADULT - ASSESSMENT
49 years old female with history of HTN, HLD, CKD, DM, Chronic combined systolic and diastolic CHF present to ED with complain of worsening SOB and orthopnea and was admitted w/ acute respiratory failure with hypoxia requring BiPAP due to acute on chronic combined systolic and diastolic congestive heart failure.     Acute on chronic combined systolic and diastolic congestive heart failure  - worsening SOB, orthopnea. CXR with pul congestion > on right( similar to prior admission)  - Echo from 09/16 showed EF 45-50% w/ severe left ventricular hypertrophy and severe (grade 3) left ventricular diastolic dysfunction. The LA was severely dilated w/ mild mitral valve stenosis, mild to moderate mitral regurgitation, moderate tricuspid regurgitation, moderate aortic regurgitation & moderate pulmonic regurgitation   - BNP 6642  - c/w IV Lasix - dose lowered as per card recommendation  - c/w Entresto & Coreg   - patient is refusing dietitian consult for dietary education  - Cardiology following     Hypokalemia  - replace w/ KCl    HTN  - c/w Norvasc, Entresto & Coreg     Hyperlipidemia  - c/w Rosuvastatin     DM II   - patient refusing insulin, so was put on Tradjenta   - A1C is 10.4    DAVID on CKD III   - resolved, Cr is now at baseline     Obesity w/ BMI 32  - encourage diet and exercise     Prophylaxis:  DVT: Lovenox   GI: PO diet

## 2024-09-25 NOTE — PROGRESS NOTE ADULT - SUBJECTIVE AND OBJECTIVE BOX
49 years old female with history of HTN, HLD, CKD, DM, Chronic combined systolic and diastolic CHF present to ED with complain of worsening SOB and orthopnea and was admitted w/ acute respiratory failure with hypoxia requring BiPAP due to acute on chronic combined systolic and diastolic congestive heart failure. She is lying in bed in NAD.    MEDICATIONS  (STANDING):  amLODIPine   Tablet 10 milliGRAM(s) Oral daily  aspirin enteric coated 81 milliGRAM(s) Oral daily  carvedilol 25 milliGRAM(s) Oral every 12 hours  dextrose 5%. 1000 milliLiter(s) (50 mL/Hr) IV Continuous <Continuous>  dextrose 5%. 1000 milliLiter(s) (100 mL/Hr) IV Continuous <Continuous>  dextrose 50% Injectable 12.5 Gram(s) IV Push once  dextrose 50% Injectable 25 Gram(s) IV Push once  dextrose 50% Injectable 25 Gram(s) IV Push once  enoxaparin Injectable 40 milliGRAM(s) SubCutaneous every 24 hours  glucagon  Injectable 1 milliGRAM(s) IntraMuscular once  linagliptin 5 milliGRAM(s) Oral daily  potassium chloride   Powder 40 milliEquivalent(s) Oral once  rosuvastatin 20 milliGRAM(s) Oral at bedtime  sacubitril 49 mG/valsartan 51 mG 1 Tablet(s) Oral two times a day    MEDICATIONS  (PRN):  acetaminophen     Tablet .. 650 milliGRAM(s) Oral every 6 hours PRN Mild Pain (1 - 3), Moderate Pain (4 - 6)  dextrose Oral Gel 15 Gram(s) Oral once PRN Blood Glucose LESS THAN 70 milliGRAM(s)/deciliter  melatonin 3 milliGRAM(s) Oral at bedtime PRN Insomnia      Allergies    lisinopril (Other)    Intolerances        Vital Signs Last 24 Hrs  T(C): 36.3 (25 Sep 2024 11:00), Max: 37.2 (24 Sep 2024 23:54)  T(F): 97.3 (25 Sep 2024 11:00), Max: 98.9 (24 Sep 2024 23:54)  HR: 83 (25 Sep 2024 11:00) (70 - 107)  BP: 132/88 (25 Sep 2024 13:20) (119/81 - 146/91)  BP(mean): 94 (24 Sep 2024 17:30) (94 - 110)  RR: 18 (25 Sep 2024 11:00) (18 - 20)  SpO2: 99% (25 Sep 2024 11:00) (95% - 99%)    Parameters below as of 25 Sep 2024 11:00  Patient On (Oxygen Delivery Method): room air        PHYSICAL EXAM:  GENERAL: NAD   HEAD:  Atraumatic, Normocephalic  EYES: EOMI, PERRLA   NECK: Supple   NERVOUS SYSTEM:  Alert & Oriented X3, Good concentration   CHEST/LUNG: Clear to auscultation bilaterally; No rales, rhonchi, wheezing, or rubs  HEART: Regular rate and rhythm; No murmurs, rubs, or gallops  ABDOMEN: Soft, Nontender, Nondistended; Bowel sounds present  EXTREMITIES: No clubbing, cyanosis, or edema       LABS:                        11.5   10.07 )-----------( 192      ( 25 Sep 2024 05:30 )             33.8     09-25    141  |  105  |  22  ----------------------------<  118[H]  3.3[L]   |  28  |  1.76[H]    Ca    8.5      25 Sep 2024 05:30  Phos  2.8     09-24  Mg     2.0     09-25    TPro  6.8  /  Alb  2.7[L]  /  TBili  0.7  /  DBili  x   /  AST  19  /  ALT  33  /  AlkPhos  113  09-24      Urinalysis Basic - ( 25 Sep 2024 05:30 )    Color: x / Appearance: x / SG: x / pH: x  Gluc: 118 mg/dL / Ketone: x  / Bili: x / Urobili: x   Blood: x / Protein: x / Nitrite: x   Leuk Esterase: x / RBC: x / WBC x   Sq Epi: x / Non Sq Epi: x / Bacteria: x      CAPILLARY BLOOD GLUCOSE      POCT Blood Glucose.: 123 mg/dL (24 Sep 2024 16:56)      RADIOLOGY & ADDITIONAL TESTS:    09-24-24 @ 07:01  -  09-25-24 @ 07:00  --------------------------------------------------------  IN:    Oral Fluid: 150 mL  Total IN: 150 mL    OUT:  Total OUT: 0 mL    Total NET: 150 mL

## 2024-09-25 NOTE — DIETITIAN INITIAL EVALUATION ADULT - TIME FRAME
[address]  Phone: [phone]  Fax: [phone]    Francis Velarde MD        Mallory 10, 2020     Patient: Hiral Dominguez   YOB: 1997   Date of Visit: 6/10/2020       To Whom It May Concern: It is my medical opinion that Roderick Stahl may go back to work sedentary duty for the next 2-3 weeks. May return full duty in 7-10 days if she is doing better. If you have any questions or concerns, please don't hesitate to call.     Sincerely,            MD Francis Lopez MD
1 week

## 2024-09-25 NOTE — DIETITIAN INITIAL EVALUATION ADULT - OTHER INFO
Pt short with answers during interview. Reports good appetite and PO intake. States no diet restrictions PTA and no known food allergies. Denies difficulty chewing/swallowing. Reports no weight changes. Noted some weight gain as detailed below.  Pt woth T2DM; confirms Tradjenta and Jardiance used at home. HbA1c 10.4% indicates poor blood glucose management.  Pt declined nutrition education at this time. Made aware RD remains available.

## 2024-09-25 NOTE — PROGRESS NOTE ADULT - ASSESSMENT
49F with HFrEF htn DM, multiple recent hospitalizations for adhf p/w sob.   pt with known cardiomyopathy  multiple prev admissions over past few weeks for ahdf  pt states med and dietary compliance at home, but unclear if this is accurate as she keeps returning with volume overload and hypoxic resp failure.  per recent notes, pt has refused cMRI  BNP 6600    Cont on supplemental o2 and BiPAP as needed  currently on IV lasix 80 iv bid, diuresing well  cont tele, short runs of NSVTs noted, keep K >4, Mg >2  monitor lytes and renal function   daily weights  recent TTE reviewed  cont entresto 49/51 bid, coreg 25 bid, norvasc 10/d. pt may have had flash pulm edema 2/2 hypertensive episode at home  may benefit from aldactone and SGLT2-I, would consider adding themwhen renal function improves  Pt requesting to have home O2 especially for night, pt was supposed to have sleepy study done, but unable to complete it due to frequent hospitalization, discussed with   will follow with you 49F with HFrEF htn DM, multiple recent hospitalizations for adhf p/w sob.   pt with known cardiomyopathy  multiple prev admissions over past few weeks for ahdf  pt states med and dietary compliance at home, but unclear if this is accurate as she keeps returning with volume overload and hypoxic resp failure.  per recent notes, pt has refused cMRI  BNP 6600    Cont on supplemental o2 and BiPAP as needed  currently on IV lasix 80 iv bid, diuresing well  cont tele, short runs of NSVTs noted, keep K >4, Mg >2  monitor lytes and renal function   daily weights  recent TTE reviewed  cont entresto 49/51 bid, coreg 25 bid, norvasc 10/d. pt may have had flash pulm edema 2/2 hypertensive episode at home  may benefit from aldactone and SGLT2-I, would consider adding them when renal function improves  Pt requesting to have home O2 especially for night, pt was supposed to have sleepy study done, but unable to complete it due to frequent hospitalization, discussed with   will follow with you

## 2024-09-25 NOTE — PROGRESS NOTE ADULT - SUBJECTIVE AND OBJECTIVE BOX
Patient is a 49y old  Female who presents with a chief complaint of sob    PAST MEDICAL & SURGICAL HISTORY:  Hypertension    Diabetes    Smoking hx     CKD    Peptic ulcer disease    congestive heart failure    INTERVAL HISTORY:  	  MEDICATIONS:  MEDICATIONS  (STANDING):  amLODIPine   Tablet 10 milliGRAM(s) Oral daily  aspirin enteric coated 81 milliGRAM(s) Oral daily  carvedilol 25 milliGRAM(s) Oral every 12 hours  enoxaparin Injectable 40 milliGRAM(s) SubCutaneous every 24 hours  furosemide   Injectable 80 milliGRAM(s) IV Push two times a day  linagliptin 5 milliGRAM(s) Oral daily  rosuvastatin 20 milliGRAM(s) Oral at bedtime  sacubitril 49 mG/valsartan 51 mG 1 Tablet(s) Oral two times a day    MEDICATIONS  (PRN):  acetaminophen     Tablet .. 650 milliGRAM(s) Oral every 6 hours PRN Mild Pain (1 - 3), Moderate Pain (4 - 6)  dextrose Oral Gel 15 Gram(s) Oral once PRN Blood Glucose LESS THAN 70 milliGRAM(s)/deciliter  melatonin 3 milliGRAM(s) Oral at bedtime PRN Insomnia    Vitals:  T(F): 98.6 (09-25-24 @ 05:56), Max: 98.9 (09-24-24 @ 23:54)  HR: 70 (09-25-24 @ 09:05) (70 - 107)  BP: 143/99 (09-25-24 @ 05:56) (119/81 - 146/91)  RR: 18 (09-25-24 @ 05:56) (18 - 20)  SpO2: 95% (09-25-24 @ 09:05) (95% - 99%)    09-24 @ 07:01  -  09-25 @ 07:00  --------------------------------------------------------  IN:    Oral Fluid: 150 mL  Total IN: 150 mL    OUT:  Total OUT: 0 mL    Total NET: 150 mL    Weight (kg): 99.3 (09-23 @ 21:03)  BMI (kg/m2): 34.3 (09-23 @ 21:03)    PHYSICAL EXAM:  Neuro: Awake, responsive  CV: S1 S2 RRR  Lungs: CTABL  GI: Soft, BS +, ND, NT  Extremities: No edema    TELEMETRY: sinus, PVCs    RADIOLOGY:   < from: Xray Chest 1 View- PORTABLE-Urgent (Xray Chest 1 View- PORTABLE-Urgent .) (09.23.24 @ 09:04) >  IMPRESSION: Persistent bilateral infiltrates right greater than left.    < end of copied text >    DIAGNOSTIC TESTING:    [x ] Echocardiogram:< from: TTE Echo Complete w/o Contrast w/ Doppler (09.16.24 @ 10:17) >     1. Left ventricular cavity is mildly dilated. Left ventricular systolic   function is mildly decreased with an ejection fraction visually estimated   at 45 to 50 %.   2. Severe left ventricular hypertrophy.   3. There is severe (grade 3) left ventricular diastolic dysfunction.   4. Normal right ventricular cavity size and mildly increased wall   thickness,.   5. Left atrium is severely dilated.   6. Mildmitral valve leaflet calcification.   7. Mild mitral valve stenosis.   8. Mild to moderate mitral regurgitation.   9. Moderate tricuspid regurgitation.  10. Moderate aortic regurgitation.  11. Moderate pulmonic regurgitation.  12. Small pericardial effusion with no evidence of hemodynamic compromise   (or echocardiographic evidence of cardiac tamponade). There is some RA   collapse, but this lacks specificity and not diagnostic in itself.  13. Estimated pulmonary artery systolic pressure is 44 mmHg.    < end of copied text >    LABS:	 	    CARDIAC MARKERS:  Troponin I, High Sensitivity Result: 52.7 ng/L (09-23 @ 06:50)    25 Sep 2024 05:30    141    |  105    |  22     ----------------------------<  118    3.3     |  28     |  1.76   24 Sep 2024 18:28    142    |  104    |  22     ----------------------------<  160    3.6     |  32     |  2.06   24 Sep 2024 07:50    143    |  107    |  20     ----------------------------<  172    3.4     |  30     |  1.78     Ca    8.5        25 Sep 2024 05:30  Phos  2.8       24 Sep 2024 18:28  Mg     2.0       25 Sep 2024 05:30    TPro  6.8    /  Alb  2.7    /  TBili  0.7    /  DBili  x      /  AST  19     /  ALT  33     /  AlkPhos  113    24 Sep 2024 07:50                        11.5   10.07 )-----------( 192      ( 25 Sep 2024 05:30 )             33.8 ,                       11.2   9.81  )-----------( 212      ( 24 Sep 2024 18:28 )             33.2 ,                       10.8   10.74 )-----------( 196      ( 24 Sep 2024 07:50 )             31.3 ,                       14.0   11.46 )-----------( 206      ( 23 Sep 2024 06:50 )             42.9   pro-BNP: Pro-Brain Natriuretic Peptide: 6642 pg/mL (09.23.24 @ 06:50)                     Patient is a 49y old  Female who presents with a chief complaint of sob    PAST MEDICAL & SURGICAL HISTORY:  Hypertension    Diabetes    Smoking hx     CKD    Peptic ulcer disease    congestive heart failure    INTERVAL HISTORY: in no acute distress, still c/o PND   	  MEDICATIONS:  MEDICATIONS  (STANDING):  amLODIPine   Tablet 10 milliGRAM(s) Oral daily  aspirin enteric coated 81 milliGRAM(s) Oral daily  carvedilol 25 milliGRAM(s) Oral every 12 hours  enoxaparin Injectable 40 milliGRAM(s) SubCutaneous every 24 hours  furosemide   Injectable 80 milliGRAM(s) IV Push two times a day  linagliptin 5 milliGRAM(s) Oral daily  rosuvastatin 20 milliGRAM(s) Oral at bedtime  sacubitril 49 mG/valsartan 51 mG 1 Tablet(s) Oral two times a day    MEDICATIONS  (PRN):  acetaminophen     Tablet .. 650 milliGRAM(s) Oral every 6 hours PRN Mild Pain (1 - 3), Moderate Pain (4 - 6)  dextrose Oral Gel 15 Gram(s) Oral once PRN Blood Glucose LESS THAN 70 milliGRAM(s)/deciliter  melatonin 3 milliGRAM(s) Oral at bedtime PRN Insomnia    Vitals:  T(F): 98.6 (09-25-24 @ 05:56), Max: 98.9 (09-24-24 @ 23:54)  HR: 70 (09-25-24 @ 09:05) (70 - 107)  BP: 143/99 (09-25-24 @ 05:56) (119/81 - 146/91)  RR: 18 (09-25-24 @ 05:56) (18 - 20)  SpO2: 95% (09-25-24 @ 09:05) (95% - 99%)    09-24 @ 07:01  -  09-25 @ 07:00  --------------------------------------------------------  IN:    Oral Fluid: 150 mL  Total IN: 150 mL    OUT:  Total OUT: 0 mL    Total NET: 150 mL    Weight (kg): 99.3 (09-23 @ 21:03)  BMI (kg/m2): 34.3 (09-23 @ 21:03)    PHYSICAL EXAM:  Neuro: Awake, responsive  CV: S1 S2 RRR +SM  Lungs: diminished to bases   GI: Soft, BS +, ND, NT  Extremities: Trace LE edema    TELEMETRY: sinus, PVCs, short runs of NSVT    RADIOLOGY:   < from: Xray Chest 1 View- PORTABLE-Urgent (Xray Chest 1 View- PORTABLE-Urgent .) (09.23.24 @ 09:04) >  IMPRESSION: Persistent bilateral infiltrates right greater than left.    < end of copied text >    DIAGNOSTIC TESTING:    [x ] Echocardiogram:< from: TTE Echo Complete w/o Contrast w/ Doppler (09.16.24 @ 10:17) >     1. Left ventricular cavity is mildly dilated. Left ventricular systolic   function is mildly decreased with an ejection fraction visually estimated   at 45 to 50 %.   2. Severe left ventricular hypertrophy.   3. There is severe (grade 3) left ventricular diastolic dysfunction.   4. Normal right ventricular cavity size and mildly increased wall   thickness,.   5. Left atrium is severely dilated.   6. Mild mitral valve leaflet calcification.   7. Mild mitral valve stenosis.   8. Mild to moderate mitral regurgitation.   9. Moderate tricuspid regurgitation.  10. Moderate aortic regurgitation.  11. Moderate pulmonic regurgitation.  12. Small pericardial effusion with no evidence of hemodynamic compromise   (or echocardiographic evidence of cardiac tamponade). There is some RA   collapse, but this lacks specificity and not diagnostic in itself.  13. Estimated pulmonary artery systolic pressure is 44 mmHg.    < end of copied text >    LABS:	 	    CARDIAC MARKERS:  Troponin I, High Sensitivity Result: 52.7 ng/L (09-23 @ 06:50)    25 Sep 2024 05:30    141    |  105    |  22     ----------------------------<  118    3.3     |  28     |  1.76   24 Sep 2024 18:28    142    |  104    |  22     ----------------------------<  160    3.6     |  32     |  2.06   24 Sep 2024 07:50    143    |  107    |  20     ----------------------------<  172    3.4     |  30     |  1.78     Ca    8.5        25 Sep 2024 05:30  Phos  2.8       24 Sep 2024 18:28  Mg     2.0       25 Sep 2024 05:30    TPro  6.8    /  Alb  2.7    /  TBili  0.7    /  DBili  x      /  AST  19     /  ALT  33     /  AlkPhos  113    24 Sep 2024 07:50                        11.5   10.07 )-----------( 192      ( 25 Sep 2024 05:30 )             33.8 ,                       11.2   9.81  )-----------( 212      ( 24 Sep 2024 18:28 )             33.2 ,                       10.8   10.74 )-----------( 196      ( 24 Sep 2024 07:50 )             31.3 ,                       14.0   11.46 )-----------( 206      ( 23 Sep 2024 06:50 )             42.9   pro-BNP: Pro-Brain Natriuretic Peptide: 6642 pg/mL (09.23.24 @ 06:50)

## 2024-09-25 NOTE — DIETITIAN INITIAL EVALUATION ADULT - PERTINENT LABORATORY DATA
09-25    141  |  105  |  22  ----------------------------<  118[H]  3.3[L]   |  28  |  1.76[H]    Ca    8.5      25 Sep 2024 05:30  Phos  2.8     09-24  Mg     2.0     09-25    TPro  6.8  /  Alb  2.7[L]  /  TBili  0.7  /  DBili  x   /  AST  19  /  ALT  33  /  AlkPhos  113  09-24  POCT Blood Glucose.: 123 mg/dL (09-24-24 @ 16:56)  A1C with Estimated Average Glucose Result: 10.4 % (09-24-24 @ 07:50)

## 2024-09-26 VITALS
RESPIRATION RATE: 18 BRPM | SYSTOLIC BLOOD PRESSURE: 130 MMHG | TEMPERATURE: 98 F | DIASTOLIC BLOOD PRESSURE: 88 MMHG | OXYGEN SATURATION: 96 % | HEART RATE: 88 BPM

## 2024-09-26 DIAGNOSIS — K27.9 PEPTIC ULCER, SITE UNSPECIFIED, UNSPECIFIED AS ACUTE OR CHRONIC, WITHOUT HEMORRHAGE OR PERFORATION: ICD-10-CM

## 2024-09-26 DIAGNOSIS — I13.0 HYPERTENSIVE HEART AND CHRONIC KIDNEY DISEASE WITH HEART FAILURE AND STAGE 1 THROUGH STAGE 4 CHRONIC KIDNEY DISEASE, OR UNSPECIFIED CHRONIC KIDNEY DISEASE: ICD-10-CM

## 2024-09-26 DIAGNOSIS — N18.30 CHRONIC KIDNEY DISEASE, STAGE 3 UNSPECIFIED: ICD-10-CM

## 2024-09-26 DIAGNOSIS — I50.43 ACUTE ON CHRONIC COMBINED SYSTOLIC (CONGESTIVE) AND DIASTOLIC (CONGESTIVE) HEART FAILURE: ICD-10-CM

## 2024-09-26 DIAGNOSIS — J96.01 ACUTE RESPIRATORY FAILURE WITH HYPOXIA: ICD-10-CM

## 2024-09-26 DIAGNOSIS — E11.22 TYPE 2 DIABETES MELLITUS WITH DIABETIC CHRONIC KIDNEY DISEASE: ICD-10-CM

## 2024-09-26 DIAGNOSIS — Z79.4 LONG TERM (CURRENT) USE OF INSULIN: ICD-10-CM

## 2024-09-26 DIAGNOSIS — I47.19 OTHER SUPRAVENTRICULAR TACHYCARDIA: ICD-10-CM

## 2024-09-26 DIAGNOSIS — E66.9 OBESITY, UNSPECIFIED: ICD-10-CM

## 2024-09-26 DIAGNOSIS — Z79.84 LONG TERM (CURRENT) USE OF ORAL HYPOGLYCEMIC DRUGS: ICD-10-CM

## 2024-09-26 DIAGNOSIS — N17.9 ACUTE KIDNEY FAILURE, UNSPECIFIED: ICD-10-CM

## 2024-09-26 DIAGNOSIS — Z88.8 ALLERGY STATUS TO OTHER DRUGS, MEDICAMENTS AND BIOLOGICAL SUBSTANCES: ICD-10-CM

## 2024-09-26 LAB
ANION GAP SERPL CALC-SCNC: 5 MMOL/L — SIGNIFICANT CHANGE UP (ref 5–17)
BUN SERPL-MCNC: 28 MG/DL — HIGH (ref 7–23)
CALCIUM SERPL-MCNC: 8.4 MG/DL — LOW (ref 8.5–10.1)
CHLORIDE SERPL-SCNC: 106 MMOL/L — SIGNIFICANT CHANGE UP (ref 96–108)
CO2 SERPL-SCNC: 28 MMOL/L — SIGNIFICANT CHANGE UP (ref 22–31)
CREAT SERPL-MCNC: 1.8 MG/DL — HIGH (ref 0.5–1.3)
EGFR: 34 ML/MIN/1.73M2 — LOW
GLUCOSE BLDC GLUCOMTR-MCNC: 149 MG/DL — HIGH (ref 70–99)
GLUCOSE SERPL-MCNC: 146 MG/DL — HIGH (ref 70–99)
MAGNESIUM SERPL-MCNC: 2 MG/DL — SIGNIFICANT CHANGE UP (ref 1.6–2.6)
PHOSPHATE SERPL-MCNC: 3.5 MG/DL — SIGNIFICANT CHANGE UP (ref 2.5–4.5)
POTASSIUM SERPL-MCNC: 4 MMOL/L — SIGNIFICANT CHANGE UP (ref 3.5–5.3)
POTASSIUM SERPL-SCNC: 4 MMOL/L — SIGNIFICANT CHANGE UP (ref 3.5–5.3)
SODIUM SERPL-SCNC: 139 MMOL/L — SIGNIFICANT CHANGE UP (ref 135–145)

## 2024-09-26 PROCEDURE — 99239 HOSP IP/OBS DSCHRG MGMT >30: CPT

## 2024-09-26 PROCEDURE — 99233 SBSQ HOSP IP/OBS HIGH 50: CPT

## 2024-09-26 RX ORDER — FUROSEMIDE 10 MG/ML
1 INJECTION INTRAVENOUS
Qty: 28 | Refills: 0
Start: 2024-09-26 | End: 2024-10-23

## 2024-09-26 RX ORDER — FUROSEMIDE 40 MG
1 TABLET ORAL
Refills: 0 | DISCHARGE

## 2024-09-26 RX ORDER — AMLODIPINE BESYLATE 5 MG
1 TABLET ORAL
Qty: 0 | Refills: 0 | DISCHARGE
Start: 2024-09-26

## 2024-09-26 RX ORDER — ASPIRIN 81 MG
1 TABLET, DELAYED RELEASE (ENTERIC COATED) ORAL
Refills: 0 | DISCHARGE

## 2024-09-26 RX ORDER — AMLODIPINE BESYLATE 10 MG/1
2 TABLET ORAL
Refills: 0 | DISCHARGE

## 2024-09-26 RX ORDER — DAPAGLIFLOZIN 10 MG/1
1 TABLET, FILM COATED ORAL
Qty: 30 | Refills: 0
Start: 2024-09-26 | End: 2024-10-25

## 2024-09-26 RX ADMIN — Medication 10 MILLIGRAM(S): at 05:26

## 2024-09-26 RX ADMIN — Medication 81 MILLIGRAM(S): at 11:14

## 2024-09-26 RX ADMIN — SACUBITRIL AND VALSARTAN 1 TABLET(S): 97; 103 TABLET, FILM COATED ORAL at 05:26

## 2024-09-26 RX ADMIN — LINAGLIPTIN 5 MILLIGRAM(S): 5 TABLET, FILM COATED ORAL at 11:13

## 2024-09-26 RX ADMIN — FUROSEMIDE 40 MILLIGRAM(S): 10 INJECTION INTRAVENOUS at 13:32

## 2024-09-26 RX ADMIN — Medication 25 MILLIGRAM(S): at 05:26

## 2024-09-26 RX ADMIN — FUROSEMIDE 40 MILLIGRAM(S): 10 INJECTION INTRAVENOUS at 05:27

## 2024-09-26 NOTE — PROGRESS NOTE ADULT - SUBJECTIVE AND OBJECTIVE BOX
Patient is a 49y old  Female who presents with a chief complaint of sob    PAST MEDICAL & SURGICAL HISTORY:  Hypertension    Diabetes    Smoking hx     CKD    Peptic ulcer disease    congestive heart failure    INTERVAL HISTORY:  	  MEDICATIONS:  MEDICATIONS  (STANDING):  amLODIPine   Tablet 10 milliGRAM(s) Oral daily  aspirin enteric coated 81 milliGRAM(s) Oral daily  carvedilol 25 milliGRAM(s) Oral every 12 hours  enoxaparin Injectable 40 milliGRAM(s) SubCutaneous every 24 hours  furosemide   Injectable 40 milliGRAM(s) IV Push two times a day  linagliptin 5 milliGRAM(s) Oral daily  rosuvastatin 20 milliGRAM(s) Oral at bedtime  sacubitril 49 mG/valsartan 51 mG 1 Tablet(s) Oral two times a day    MEDICATIONS  (PRN):  acetaminophen     Tablet .. 650 milliGRAM(s) Oral every 6 hours PRN Mild Pain (1 - 3), Moderate Pain (4 - 6)  dextrose Oral Gel 15 Gram(s) Oral once PRN Blood Glucose LESS THAN 70 milliGRAM(s)/deciliter  melatonin 3 milliGRAM(s) Oral at bedtime PRN Insomnia    Vitals:  T(F): 97.9 (09-26-24 @ 04:48), Max: 98.4 (09-25-24 @ 23:43)  HR: 85 (09-26-24 @ 04:48) (79 - 90)  BP: 125/85 (09-26-24 @ 04:48) (125/85 - 136/85)  RR: 18 (09-26-24 @ 04:48) (18 - 18)  SpO2: 96% (09-26-24 @ 04:48) (96% - 100%)  Wt(kg): --98.2 kg    09-25 @ 07:01  -  09-26 @ 07:00  --------------------------------------------------------  IN:    Oral Fluid: 350 mL  Total IN: 350 mL    OUT:  Total OUT: 0 mL    Total NET: 350 mL    Weight (kg): 99.3 (09-23 @ 21:03)    PHYSICAL EXAM:  Neuro: Awake, responsive  CV: S1 S2 RRR  Lungs: CTABL  GI: Soft, BS +, ND, NT  Extremities: No edema    TELEMETRY: sinus, NSVTs    RADIOLOGY: < from: Xray Chest 1 View- PORTABLE-Urgent (Xray Chest 1 View- PORTABLE-Urgent .) (09.23.24 @ 09:04) >  IMPRESSION: Persistent bilateral infiltrates right greater than left.    < end of copied text >    DIAGNOSTIC TESTING:    [x ] Echocardiogram: < from: TTE Echo Complete w/o Contrast w/ Doppler (09.16.24 @ 10:17) >   1. Left ventricular cavity is mildly dilated. Left ventricular systolic   function is mildly decreased with an ejection fraction visually estimated   at 45 to 50 %.   2. Severe left ventricular hypertrophy.   3. There is severe (grade 3) left ventricular diastolic dysfunction.   4. Normal right ventricular cavity size and mildly increased wall   thickness,.   5. Left atrium is severely dilated.   6. Mildmitral valve leaflet calcification.   7. Mild mitral valve stenosis.   8. Mild to moderate mitral regurgitation.   9. Moderate tricuspid regurgitation.  10. Moderate aortic regurgitation.  11. Moderate pulmonic regurgitation.  12. Small pericardial effusion with no evidence of hemodynamic compromise   (or echocardiographic evidence of cardiac tamponade). There is some RA   collapse, but this lacks specificity and not diagnostic in itself.  13. Estimated pulmonary artery systolic pressure is 44 mmHg.    < end of copied text >      LABS:	 	    26 Sep 2024 06:50    139    |  106    |  28     ----------------------------<  146    4.0     |  28     |  1.80   25 Sep 2024 05:30    141    |  105    |  22     ----------------------------<  118    3.3     |  28     |  1.76   24 Sep 2024 18:28    142    |  104    |  22     ----------------------------<  160    3.6     |  32     |  2.06     Ca    8.4        26 Sep 2024 06:50  Phos  3.5       26 Sep 2024 06:50  Mg     2.0       26 Sep 2024 06:50                       11.5   10.07 )-----------( 192      ( 25 Sep 2024 05:30 )             33.8 ,                       11.2   9.81  )-----------( 212      ( 24 Sep 2024 18:28 )             33.2 ,                       10.8   10.74 )-----------( 196      ( 24 Sep 2024 07:50 )             31.3   pro-BNP: Pro-Brain Natriuretic Peptide: 4505 pg/mL (09.25.24 @ 05:30)                   Patient is a 49y old  Female who presents with a chief complaint of sob    PAST MEDICAL & SURGICAL HISTORY:  Hypertension    Diabetes    Smoking hx     CKD    Peptic ulcer disease    congestive heart failure    INTERVAL HISTORY: no acute distress  	  MEDICATIONS:  MEDICATIONS  (STANDING):  amLODIPine   Tablet 10 milliGRAM(s) Oral daily  aspirin enteric coated 81 milliGRAM(s) Oral daily  carvedilol 25 milliGRAM(s) Oral every 12 hours  enoxaparin Injectable 40 milliGRAM(s) SubCutaneous every 24 hours  furosemide   Injectable 40 milliGRAM(s) IV Push two times a day  linagliptin 5 milliGRAM(s) Oral daily  rosuvastatin 20 milliGRAM(s) Oral at bedtime  sacubitril 49 mG/valsartan 51 mG 1 Tablet(s) Oral two times a day    MEDICATIONS  (PRN):  acetaminophen     Tablet .. 650 milliGRAM(s) Oral every 6 hours PRN Mild Pain (1 - 3), Moderate Pain (4 - 6)  dextrose Oral Gel 15 Gram(s) Oral once PRN Blood Glucose LESS THAN 70 milliGRAM(s)/deciliter  melatonin 3 milliGRAM(s) Oral at bedtime PRN Insomnia    Vitals:  T(F): 97.9 (09-26-24 @ 04:48), Max: 98.4 (09-25-24 @ 23:43)  HR: 85 (09-26-24 @ 04:48) (79 - 90)  BP: 125/85 (09-26-24 @ 04:48) (125/85 - 136/85)  RR: 18 (09-26-24 @ 04:48) (18 - 18)  SpO2: 96% (09-26-24 @ 04:48) (96% - 100%)  Wt(kg): --98.2 kg    09-25 @ 07:01  -  09-26 @ 07:00  --------------------------------------------------------  IN:    Oral Fluid: 350 mL  Total IN: 350 mL    OUT:  Total OUT: 0 mL    Total NET: 350 mL    Weight (kg): 99.3 (09-23 @ 21:03)    PHYSICAL EXAM:  Neuro: Awake, responsive  CV: S1 S2 RRR  Lungs: diminished to bases  GI: Soft, BS +, ND, NT  Extremities: No edema    TELEMETRY: sinus, NSVTs    RADIOLOGY: < from: Xray Chest 1 View- PORTABLE-Urgent (Xray Chest 1 View- PORTABLE-Urgent .) (09.23.24 @ 09:04) >  IMPRESSION: Persistent bilateral infiltrates right greater than left.    < end of copied text >    DIAGNOSTIC TESTING:    [x ] Echocardiogram: < from: TTE Echo Complete w/o Contrast w/ Doppler (09.16.24 @ 10:17) >   1. Left ventricular cavity is mildly dilated. Left ventricular systolic   function is mildly decreased with an ejection fraction visually estimated   at 45 to 50 %.   2. Severe left ventricular hypertrophy.   3. There is severe (grade 3) left ventricular diastolic dysfunction.   4. Normal right ventricular cavity size and mildly increased wall   thickness,.   5. Left atrium is severely dilated.   6. Mildmitral valve leaflet calcification.   7. Mild mitral valve stenosis.   8. Mild to moderate mitral regurgitation.   9. Moderate tricuspid regurgitation.  10. Moderate aortic regurgitation.  11. Moderate pulmonic regurgitation.  12. Small pericardial effusion with no evidence of hemodynamic compromise   (or echocardiographic evidence of cardiac tamponade). There is some RA   collapse, but this lacks specificity and not diagnostic in itself.  13. Estimated pulmonary artery systolic pressure is 44 mmHg.    < end of copied text >    LABS:	 	    26 Sep 2024 06:50    139    |  106    |  28     ----------------------------<  146    4.0     |  28     |  1.80   25 Sep 2024 05:30    141    |  105    |  22     ----------------------------<  118    3.3     |  28     |  1.76   24 Sep 2024 18:28    142    |  104    |  22     ----------------------------<  160    3.6     |  32     |  2.06     Ca    8.4        26 Sep 2024 06:50  Phos  3.5       26 Sep 2024 06:50  Mg     2.0       26 Sep 2024 06:50                       11.5   10.07 )-----------( 192      ( 25 Sep 2024 05:30 )             33.8 ,                       11.2   9.81  )-----------( 212      ( 24 Sep 2024 18:28 )             33.2 ,                       10.8   10.74 )-----------( 196      ( 24 Sep 2024 07:50 )             31.3   pro-BNP: Pro-Brain Natriuretic Peptide: 4505 pg/mL (09.25.24 @ 05:30)

## 2024-09-26 NOTE — DISCHARGE NOTE PROVIDER - HOSPITAL COURSE
49 years old female with history of HTN, HLD, CKD, DM, Chronic combined systolic and diastolic CHF present to ED with complain of worsening SOB and orthopnea and was admitted w/ acute respiratory failure with hypoxia requring BiPAP due to acute on chronic combined systolic and diastolic congestive heart failure. Echo from 09/16 showed EF 45-50% w/ severe left ventricular hypertrophy and severe (grade 3) left ventricular diastolic dysfunction. The LA was severely dilated w/ mild mitral valve stenosis, mild to moderate mitral regurgitation, moderate tricuspid regurgitation, moderate aortic regurgitation & moderate pulmonic regurgitation. The patient improved w/ diuresis but refused mulitple vital signs checks and FS checks. As per my conversation w/ Dr. Barnes today, the patient was cleared for discharge home on Farxiga & Lasix 40mg. Her DAVID on CKD III has resolved.     Discharge time: 43 minutes      Vital Signs Last 24 Hrs  T(C): 36.8 (26 Sep 2024 15:55), Max: 36.9 (25 Sep 2024 23:43)  T(F): 98.2 (26 Sep 2024 15:55), Max: 98.4 (25 Sep 2024 23:43)  HR: 88 (26 Sep 2024 15:55) (80 - 90)  BP: 130/88 (26 Sep 2024 15:55) (122/85 - 135/90)   RR: 18 (26 Sep 2024 15:55) (18 - 18)  SpO2: 96% (26 Sep 2024 15:55) (93% - 98%)    Parameters below as of 26 Sep 2024 15:53  Patient On (Oxygen Delivery Method): room air     PHYSICAL EXAM:  GENERAL: NAD   HEAD:  Atraumatic, Normocephalic  EYES: EOMI, PERRLA   NECK: Supple   NERVOUS SYSTEM:  Alert & Oriented X3, Good concentration   CHEST/LUNG: Clear to auscultation bilaterally; No rales, rhonchi, wheezing, or rubs  HEART: Regular rate and rhythm; No murmurs, rubs, or gallops  ABDOMEN: Soft, Nontender, Nondistended; Bowel sounds present  EXTREMITIES: No clubbing, cyanosis, or edema

## 2024-09-26 NOTE — DISCHARGE NOTE PROVIDER - NSDCMRMEDTOKEN_GEN_ALL_CORE_FT
amLODIPine 10 mg oral tablet: 1 tab(s) orally once a day  dapagliflozin 10 mg oral tablet: 1 tab(s) orally once a day (at bedtime) in the morning  Entresto 49 mg-51 mg oral tablet: 1 tab(s) orally 2 times a day  Jardiance 25 mg oral tablet: 1 tab(s) orally once a day  rosuvastatin 20 mg oral capsule: 1 cap(s) orally once a day  Tradjenta 5 mg oral tablet: 1 tab(s) orally once a day

## 2024-09-26 NOTE — PROGRESS NOTE ADULT - REASON FOR ADMISSION
hypoxic respiratory failure, acute on chronic CHF exacerbation

## 2024-09-26 NOTE — DISCHARGE NOTE NURSING/CASE MANAGEMENT/SOCIAL WORK - NSDCPEFALRISK_GEN_ALL_CORE
For information on Fall & Injury Prevention, visit: [email or web address] OR  [email or web address] OR  https://www.cdc.gov/steadi/patient.html

## 2024-09-26 NOTE — DISCHARGE NOTE NURSING/CASE MANAGEMENT/SOCIAL WORK - PATIENT PORTAL LINK FT
You can access the FollowMyHealth Patient Portal offered by [facility] by registering at the following website: [email or web address]. By joining [email or web address] [email or web address] portal, you will also be able to view your health information using other applications (apps) compatible with our system.

## 2024-09-26 NOTE — PROGRESS NOTE ADULT - NS ATTEND AMEND GEN_ALL_CORE FT
patient seen and examined   labs and vitals reviewed  agree with above assessment and plan
patient seen and examined   labs and vitals reviewed  agree with above assessment and plan  agree with po lasix  outpt cards f/u  med compliance urged.
patient seen and examined   labs and vitals reviewed  agree with above assessment and plan  sob resolved  would decrease lasix to 40 IV bid and cont to monitor  replete lytes as required   will follow while here

## 2024-09-26 NOTE — PROGRESS NOTE ADULT - ASSESSMENT
49F with HFrEF htn DM, multiple recent hospitalizations for adhf p/w sob.   pt with known cardiomyopathy  multiple prev admissions over past few weeks for ahdf  pt states med and dietary compliance at home, but unclear if this is accurate as she keeps returning with volume overload and hypoxic resp failure.  per recent notes, pt has refused cMRI  BNP 6600-->4505    Cont on supplemental o2 and BiPAP as needed  currently on IV lasix 40 iv bid, diuresing well  cont tele, short runs of NSVTs noted, keep K >4, Mg >2  monitor lytes and renal function   daily weights  recent TTE reviewed, EF 45-50%, grade 3 DD, severe LVH, severely dilated LA, mild MS, mild-mod MR, mod TR/AR/AZ, small pericardial effusion   cont entresto 49/51 bid, coreg 25 bid, norvasc 10/d. pt may have had flash pulm edema 2/2 hypertensive episode at home  may benefit from aldactone and SGLT2-I, would consider adding them when renal function improves  Pt requesting to have home O2 especially for night, pt was supposed to have sleepy study done, but unable to complete it due to frequent hospitalization, discussed with   will follow with you 49F with HFrEF htn DM, multiple recent hospitalizations for adhf p/w sob.   pt with known cardiomyopathy  multiple prev admissions over past few weeks for ahdf  pt states med and dietary compliance at home, but unclear if this is accurate as she keeps returning with volume overload and hypoxic resp failure.  per recent notes, pt has refused cMRI  BNP 6600-->4505    Cont on supplemental o2 and BiPAP as needed  currently on IV lasix 40 iv bid, diuresing well, can switch to po  cont tele, short runs of NSVTs noted, keep K >4, Mg >2  monitor lytes and renal function   daily weights  recent TTE reviewed, EF 45-50%, grade 3 DD, severe LVH, severely dilated LA, mild MS, mild-mod MR, mod TR/AR/NC, small pericardial effusion   cont entresto 49/51 bid, coreg 25 bid, norvasc 10/d. pt may have had flash pulm edema 2/2 hypertensive episode at home  may benefit from aldactone and SGLT2-I, would consider adding them when renal function improves  Pt requesting to have home O2 especially for night, pt was supposed to have sleepy study done, but unable to complete it due to frequent hospitalization, discussed with   will follow with you

## 2024-10-02 DIAGNOSIS — I50.43 ACUTE ON CHRONIC COMBINED SYSTOLIC (CONGESTIVE) AND DIASTOLIC (CONGESTIVE) HEART FAILURE: ICD-10-CM

## 2024-10-02 DIAGNOSIS — I49.3 VENTRICULAR PREMATURE DEPOLARIZATION: ICD-10-CM

## 2024-10-02 DIAGNOSIS — N18.30 CHRONIC KIDNEY DISEASE, STAGE 3 UNSPECIFIED: ICD-10-CM

## 2024-10-02 DIAGNOSIS — J96.01 ACUTE RESPIRATORY FAILURE WITH HYPOXIA: ICD-10-CM

## 2024-10-02 DIAGNOSIS — I08.2 RHEUMATIC DISORDERS OF BOTH AORTIC AND TRICUSPID VALVES: ICD-10-CM

## 2024-10-02 DIAGNOSIS — E78.5 HYPERLIPIDEMIA, UNSPECIFIED: ICD-10-CM

## 2024-10-02 DIAGNOSIS — Z87.891 PERSONAL HISTORY OF NICOTINE DEPENDENCE: ICD-10-CM

## 2024-10-02 DIAGNOSIS — I42.9 CARDIOMYOPATHY, UNSPECIFIED: ICD-10-CM

## 2024-10-02 DIAGNOSIS — E11.22 TYPE 2 DIABETES MELLITUS WITH DIABETIC CHRONIC KIDNEY DISEASE: ICD-10-CM

## 2024-10-02 DIAGNOSIS — E87.6 HYPOKALEMIA: ICD-10-CM

## 2024-10-02 DIAGNOSIS — E66.9 OBESITY, UNSPECIFIED: ICD-10-CM

## 2024-10-02 DIAGNOSIS — R06.02 SHORTNESS OF BREATH: ICD-10-CM

## 2024-10-02 DIAGNOSIS — K27.9 PEPTIC ULCER, SITE UNSPECIFIED, UNSPECIFIED AS ACUTE OR CHRONIC, WITHOUT HEMORRHAGE OR PERFORATION: ICD-10-CM

## 2024-10-02 DIAGNOSIS — I47.19 OTHER SUPRAVENTRICULAR TACHYCARDIA: ICD-10-CM

## 2024-10-02 DIAGNOSIS — I08.3 COMBINED RHEUMATIC DISORDERS OF MITRAL, AORTIC AND TRICUSPID VALVES: ICD-10-CM

## 2024-10-02 DIAGNOSIS — I11.0 HYPERTENSIVE HEART DISEASE WITH HEART FAILURE: ICD-10-CM

## 2024-10-02 DIAGNOSIS — N17.9 ACUTE KIDNEY FAILURE, UNSPECIFIED: ICD-10-CM

## 2024-11-02 NOTE — ED ADULT NURSE NOTE - NSSEPSISNEWALTERMENTAL_ED_A_ED
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 [Z96.651]  Instability of internal right knee prosthesis, initial encounter [T84.022A]  Instability of internal left knee prosthesis, initial encounter [T84.023A]    Procedures Performed  Procedure(s):  TOTAL KNEE ARTHROPLASTY REVISION         Instability of internal left knee prosthesis, initial encounter    Chronic knee pain after total replacement of right knee joint    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    Instability of internal right knee prosthesis, initial encounter    Status post revision of total replacement of right knee         Allergies   Allergen Reactions    Nickel Itching     PAIN          Discharge Medications        New Medications        Instructions Start Date   aspirin 81 MG EC tablet   81 mg, Oral, Every 12 Hours Scheduled      bisacodyl 5 MG EC tablet  Commonly known as: DULCOLAX   10 mg, Oral, Daily PRN      HYDROcodone-acetaminophen 7.5-325 MG per tablet  Commonly known as: NORCO   1 tablet, Oral, Every 4 Hours PRN      ondansetron ODT 4 MG disintegrating tablet  Commonly known as: ZOFRAN-ODT   4 mg, Oral, Every 6 Hours PRN             Continue These Medications        Instructions Start Date   acetaminophen 500 MG tablet  Commonly known as: TYLENOL   500 mg, Every 6 Hours PRN      amLODIPine 5 MG tablet  Commonly known as: NORVASC    2.5 mg, Nightly      docusate sodium 250 MG capsule  Commonly known as: COLACE   250 mg, Oral, 2 Times Daily PRN      hydroCHLOROthiazide 25 MG tablet   25 mg, Daily      LOSARTAN POTASSIUM PO   Daily                  Past Medical History:   Diagnosis Date    Arthritis     Hypertension     FOLLOWED BY [facility]    Instability of prosthesis of right knee joint     Right knee pain         Past Surgical History:   Procedure Laterality Date    ACHILLES TENDON REPAIR Left     X2    COLONOSCOPY      FOOT SURGERY Right     HEEL SPURS    HYSTERECTOMY      KNEE ACL RECONSTRUCTION Right 07/2016    TONSILLECTOMY      TOTAL KNEE ARTHROPLASTY Right 2020    TOTAL KNEE ARTHROPLASTY REVISION Right 09/08/2022    Procedure: TOTAL KNEE ARTHROPLASTY REVISION;  Surgeon: Kevin Green MD;  [place] [facility] [place] [facility] [place]: [place]ics;  Laterality: Right;        Social History     Occupational History    Not on file   Tobacco Use    Smoking status: Never    Smokeless tobacco: Never   Vaping Use    Vaping status: Never Used   Substance and Sexual Activity    Alcohol use: No    Drug use: No    Sexual activity: Defer      Social History     Social History Narrative    Not on file        Family History   Problem Relation Age of Onset    Hypertension Other     Diabetes Other     Throat cancer Other     Lung cancer Other     Ovarian cancer Other     Prostate cancer Other     Malig Hyperthermia Neg Hx        Physical Exam: 55 y.o. female  General Appearance:    Alert, cooperative, in no acute distress                      Vitals:    11/01/24 1947 11/01/24 2058 11/02/24 0054 11/02/24 0504   BP: 147/87 144/84 129/86 105/78   BP Location: Left arm Left arm Left arm Left arm   Patient Position: Lying Lying Lying Lying   Pulse: 92 88 87 90   Resp: 16 16 16 16   Temp: 97.4 °F (36.3 °C)  97.5 °F (36.4 °C) 97.9 °F (36.6 °C)   TempSrc: Oral  Oral Oral   SpO2: 93% 98% 100% 100%   Weight:       Height:            Hospital Course:  55 y.o. female  admitted to [facility] to services of Nancy Evans,Donnie with Osteoarthritis knee  on 11/1/2024 and underwent a Revision Right  total knee arthroplasty Per Nancy Evans MD. Antibiotic and VTE prophylaxis were per SCIP protocols and included  Kefzol  every 8 hours and Aspirin daily . Post-operatively the patient transferred to the post-operative floor where the patient underwent mobilization therapy that included active as well as passive ROM exercises. Opioids were titrated to achieve appropriate pain management to allow for participation in mobilization exercises. Vital signs are now stable. The incision is intact without signs or symptoms of infection. Operative extremity neurovascular status remains intact.     Appropriate education re: incision care, activity levels, medications, and follow up visits was completed and all questions were answered. The patient is now deemed stable for discharge.      DISCHARGE DISPOSITION AND PLAN:  The  Patient is being discharged home with home health for PT  2-3 X per week for 2-3 weeks and nursing care as needed.     DIAGNOSTIC TESTS:     Admission on 11/01/2024   Component Date Value Ref Range Status    ABO Type 11/01/2024 A   Final    RH type 11/01/2024 Positive   Final    Antibody Screen 11/01/2024 Negative   Final    T&S Expiration Date 11/01/2024 11/4/2024 11:59:59 PM   Final    Tissue Culture 11/01/2024 No growth   Preliminary    Gram Stain 11/01/2024 Rare (1+) WBCs seen   Preliminary    Gram Stain 11/01/2024 No organisms seen   Preliminary    Glucose 11/02/2024 128 (H)  65 - 99 mg/dL Final    BUN 11/02/2024 13  6 - 20 mg/dL Final    Creatinine 11/02/2024 0.97  0.57 - 1.00 mg/dL Final    Sodium 11/02/2024 132 (L)  136 - 145 mmol/L Final    Potassium 11/02/2024 4.5  3.5 - 5.2 mmol/L Final    Slight hemolysis detected by analyzer. Result may be falsely elevated.    Chloride 11/02/2024 99  98 - 107 mmol/L Final    CO2 11/02/2024 20.0 (L)  22.0 -  "29.0 mmol/L Final    Calcium 11/02/2024 8.3 (L)  8.6 - 10.5 mg/dL Final    BUN/Creatinine Ratio 11/02/2024 13.4  7.0 - 25.0 Final    Anion Gap 11/02/2024 13.0  5.0 - 15.0 mmol/L Final    eGFR 11/02/2024 69.2  >60.0 mL/min/1.73 Final    WBC 11/02/2024 13.86 (H)  3.40 - 10.80 10*3/mm3 Final    RBC 11/02/2024 4.20  3.77 - 5.28 10*6/mm3 Final    Hemoglobin 11/02/2024 13.3  12.0 - 15.9 g/dL Final    Hematocrit 11/02/2024 39.0  34.0 - 46.6 % Final    MCV 11/02/2024 92.9  79.0 - 97.0 fL Final    MCH 11/02/2024 31.7  26.6 - 33.0 pg Final    MCHC 11/02/2024 34.1  31.5 - 35.7 g/dL Final    RDW 11/02/2024 12.5  12.3 - 15.4 % Final    RDW-SD 11/02/2024 42.1  37.0 - 54.0 fl Final    MPV 11/02/2024 9.1  6.0 - 12.0 fL Final    Platelets 11/02/2024 298  140 - 450 10*3/mm3 Final    Neutrophil % 11/02/2024 87.4 (H)  42.7 - 76.0 % Final    Lymphocyte % 11/02/2024 8.5 (L)  19.6 - 45.3 % Final    Monocyte % 11/02/2024 3.6 (L)  5.0 - 12.0 % Final    Eosinophil % 11/02/2024 0.0 (L)  0.3 - 6.2 % Final    Basophil % 11/02/2024 0.1  0.0 - 1.5 % Final    Immature Grans % 11/02/2024 0.4  0.0 - 0.5 % Final    Neutrophils, Absolute 11/02/2024 12.11 (H)  1.70 - 7.00 10*3/mm3 Final    Lymphocytes, Absolute 11/02/2024 1.18  0.70 - 3.10 10*3/mm3 Final    Monocytes, Absolute 11/02/2024 0.50  0.10 - 0.90 10*3/mm3 Final    Eosinophils, Absolute 11/02/2024 0.00  0.00 - 0.40 10*3/mm3 Final    Basophils, Absolute 11/02/2024 0.02  0.00 - 0.20 10*3/mm3 Final    Immature Grans, Absolute 11/02/2024 0.05  0.00 - 0.05 10*3/mm3 Final    nRBC 11/02/2024 0.0  0.0 - 0.2 /100 WBC Final       No results found for: \"URICACID\"  No results found for: \"CRYSTAL\"  Microbiology Results (last 10 days)       Procedure Component Value - Date/Time    Tissue / Bone Culture - Tissue, Knee, Right [[id number]] Collected: 11/01/24 1547    Lab Status: Preliminary result Specimen: Tissue from Knee, Right Updated: 11/02/24 0823     Tissue Culture No growth     Gram Stain Rare (1+) WBCs " seen      No organisms seen    Urine Culture - Urine, Urine, Clean Catch [[id number]]  (Normal) Collected: 10/29/24 1502    Lab Status: Final result Specimen: Urine, Clean Catch Updated: 10/30/24 2106     Urine Culture No growth    MRSA Screen Culture (Outpatient) - Swab, Nares [[id number]]  (Normal) Collected: 10/25/24 1518    Lab Status: Final result Specimen: Swab from Nares Updated: 10/26/24 1416     MRSA Screen Cx No Methicillin Resistant Staphylococcus aureus isolated    Narrative:      The negative predictive value of this diagnostic test is high and should only be used to consider de-escalating anti-MRSA therapy. A positive result may indicate colonization with MRSA and must be correlated clinically.          XR Chest PA & Lateral    Result Date: 10/26/2024  1. No active disease is seen in the chest with no change when compared to the patient's prior chest x-ray on 8/30/2022.  This report was finalized on 10/26/2024 6:02 PM by Dr. Kevin David M.D on Workstation: [id number]       Discharge and Follow up Instructions:     Total Knee Joint Replacement Discharge Instructions:    I. ACTIVITIES:  1. Exercises:  Complete exercise program as taught by the hospital physical therapist 2 times per day  Exercise program will be advanced by your home health physical therapist  During the day be up ambulating every 2 hours (while awake) for short distances  Complete the ankle pump exercises at least 10 times per hour (while awake)  Elevate legs most of the day the first week post operatively and thereafter elevate legs when in bed and for at least 30 minutes during the day.   Caution must be taken to avoid pillow placement under the bend of the knee as this can led to flexion contractures of the knee. Pillow placement under the heel is encouraged.  Use cold packs 20-30 minutes approximately 5 times per day. This should be done before and after completing your exercises and at any time you are experiencing pain/  stiffness in your operative extremity.      2. Activities of Daily Living:  No tub baths, hot tubs, or swimming pools for 4 weeks  May shower and let water run over the incision on post-operative day #5 if no drainage. Do not scrub or rub the incision. Simply let the water run over the incision and pat dry.    II. Restrictions  Do not cross legs or kneel  Your surgeon will discuss with you when you will be able to drive again. Usual guidelines are you are to be off pain medications prior to driving.  Weight bearing is as tolerated  First week stay inside on even terrain. May go up and down stairs one stair at a time utilizing the hand rail.  After one week, you may venture outside.    III. Precautions:  Everyone that comes near you should wash their hands  No elective dental, genital-urinary, or colon procedures or surgical procedures for 12 weeks after surgery unless absolutely necessary.   If dental work or surgical procedure is deemed absolutely necessary, you will need to contact your surgeon as you will need to take antibiotics 1 hour prior to any dental work (including teeth cleanings).  Please discuss with your surgeon prophylactic antibiotics as the length of time this intervention will be necessary for you varies with each patient’s health history and situation.  Avoid sick people. If you must be around someone who is ill, they should wear a mask.  Avoid visits to the Emergency Room or Urgent Care. If you feel you need to go to the emergency room, please notify your surgeon.    Stockings are to be worn for one week after surgery and are to be placed on in the morning and removed at night. Observe your skin when stocking is removed for any problems. Monitor the stockings to ensure that any swelling is not causing the stockings to become too tight. In this case, remove stockings immediately.    IV. INCISION CARE:  Wash your hands prior to dressing changes  Change the dressing as needed to keep incision clean  and dry. Utilize dry gauze and paper tape. Avoid touching the side of the gauze that goes against the incision with your hands.  No creams or ointments to the incision  May remove dressing once the incision is free of drainage  Do not touch or pick at the incision  Check incision every day and notify surgeon immediately if any of the following signs or symptoms are noted:  Increase in redness  Increase in swelling around the incision and of the entire extremity  Increase in pain  Drainage oozing from the incision  Pulling apart of the edges of the incision  Increase in overall body temperature (greater than 100.5 degrees)     You have absorbable sutures with steristrips/ Exofin Fusion, please do not remove the steri strips/ Exofin Fusion for 14 days, you can shower on them 6 days after surgery.        V. Medications:   1. Anticoagulants: You will be discharged on an anticoagulant. This is a prophylactic medication that helps prevent blood clots during your post-operative period.  You will be on Aspirin  81 mg twice daily for 30 days. If you were on Aspirin 81 mg prior to surgery you can go back to home dose once the 30 days are completed.     While taking the anticoagulant, you should avoid taking any additional aspirin, ibuprofen (Advil or Motrin), Aleve (Naprosyn) or other non-steroidal anti-inflammatory medications.   Notify surgeon immediately if any chace bleeding is noted in the urine, stool, emesis, or from the nose or the incision. Blood in the stool will often appear as black rather than red. Blood in urine may appear as pink. Blood in emesis may appear as brown/black like coffee grounds.  You will need to apply pressure for longer periods of time to any cuts or abrasions to stop bleeding  Avoid alcohol while taking anticoagulants    2. Stool Softeners: You will be at greater risk of constipation after surgery due to being less mobile and the pain medications.   Take stool softeners as instructed by your  surgeon while on pain medications. Over the counter Colace 100 mg 1-2 capsules twice daily.   If stools become too loose or too frequent, please decreases the dosage or stop the stool softener.  If constipation occurs despite use of stool softeners, you are to continue the stool softeners and add a laxative (Milk of Magnesia 1 ounce daily as needed).  Dulcolax oral tabs or suppository, or a fleets enema can also be utilized for constipation and can be obtained over the counter.   If above interventions are unsuccessful in inducing bowel movements, please contact your surgeon's office / family physician's office.  Drink plenty of fluids, and eat fruits and vegetables during your recovery time    3. Pain Medications utilized after surgery are narcotics and the law requires that the following information be given to all patients that are prescribed narcotics:  CLASSIFICATION: Pain medications are called Opioids and are narcotics  LEGALITIES: It is illegal to share narcotics with others and to drive within 24 hours of taking narcotics  POTENTIAL SIDE EFFECTS: Potential side effects of opioids include: nausea, vomiting, itching, dizziness, drowsiness, dry mouth, constipation, and difficulty urinating.  POTENTIAL ADVERSE EFFECTS:   Opioid tolerance can develop with use of pain medications and this simply means that it requires more and more of the medication to control pain; however, this is seen more in patients that use opioids for longer periods of time.  Opioid dependence can develop with use of Opioids and this simply means that to stop the medication can cause withdrawal symptoms; however, this is seen with patients that use Opioids for longer periods of time.  Opioid addiction can develop with use of Opioids and the incidence of this is very unlikely in patients who take the medications as ordered and stop the medications as instructed.  Opioid overdose can be dangerous, but is unlikely when the medication is taken  as ordered and stopped when ordered. It is important not to mix opioids with alcohol or with and type of sedative such as Benadryl as this can lead to over sedation and respiratory difficulty.  DOSAGE:   Pain medications will need to be taken consistently for the first week to decrease pain and promote adequate pain relief and participation in physical therapy.  After the initial surgical pain begins to resolve, you may begin to decrease the pain medication. By the end of 6 weeks, you should be off of pain medications.  Refills will not be given by the office during evening hours, on weekends, or after 6 weeks post-op.  To seek refills on pain medications during the initial 6 week post-operative period, you must call the office 48 hours in advance to request the refill. The office will then notify you when to  the prescription. DO NOT wait until you are out of the medication to request a refill.    V. FOLLOW-UP VISITS:  You will need to follow up in the office with your surgeon on November 13 ,2024.  Please call this number [phone] to schedule this appointment.  If you have any concerns or suspected complications prior to your follow up visit, please call your surgeons office. Do not wait until your appointment time if you suspect complications. These will need to be addressed in the office promptly.      Date:     Nancy Evans MD    CC: Provider, No Known; MD Nathan Gutiérrez, Nancy JONAS,*            No

## 2024-11-08 NOTE — DIETITIAN INITIAL EVALUATION ADULT. - NUTRITIONGOAL OUTCOME1
The patient is a 65-year-old female who presents for consultation for surveillance colonoscopy.  The patient had a screening colonoscopy on 9/18/2024 which revealed a 20 to 25 mm transverse colon polyp; resection was not attempted.  Repeat colonoscopy in the hospital for resection was recommended.  The patient was scheduled for colonoscopy on 10/31, however she did not come to this appointment.  She preferred to make a consultation to discuss why she needs a repeat colonoscopy.  The patient is upset today.  It was not fully explained to her why she needs to have this done again, and why it needs to be done in the hospital.  She denies any new symptoms today. pt to consume >75% of meals

## 2025-01-09 ENCOUNTER — INPATIENT (INPATIENT)
Facility: HOSPITAL | Age: 50
LOS: 4 days | Discharge: HOME HEALTH | End: 2025-01-14
Attending: STUDENT IN AN ORGANIZED HEALTH CARE EDUCATION/TRAINING PROGRAM | Admitting: STUDENT IN AN ORGANIZED HEALTH CARE EDUCATION/TRAINING PROGRAM
Payer: MEDICAID

## 2025-01-09 VITALS — WEIGHT: 240.08 LBS | HEIGHT: 68 IN

## 2025-01-09 LAB
ALBUMIN SERPL ELPH-MCNC: 2.9 G/DL — LOW (ref 3.3–5)
ALP SERPL-CCNC: 138 U/L — HIGH (ref 40–120)
ALT FLD-CCNC: 28 U/L — SIGNIFICANT CHANGE UP (ref 12–78)
ANION GAP SERPL CALC-SCNC: 5 MMOL/L — SIGNIFICANT CHANGE UP (ref 5–17)
AST SERPL-CCNC: 37 U/L — SIGNIFICANT CHANGE UP (ref 15–37)
BASE EXCESS BLDA CALC-SCNC: 0.2 MMOL/L — SIGNIFICANT CHANGE UP (ref -2–3)
BASOPHILS # BLD AUTO: 0.03 K/UL — SIGNIFICANT CHANGE UP (ref 0–0.2)
BASOPHILS NFR BLD AUTO: 0.2 % — SIGNIFICANT CHANGE UP (ref 0–2)
BILIRUB SERPL-MCNC: 0.5 MG/DL — SIGNIFICANT CHANGE UP (ref 0.2–1.2)
BLOOD GAS COMMENTS ARTERIAL: SIGNIFICANT CHANGE UP
BUN SERPL-MCNC: 25 MG/DL — HIGH (ref 7–23)
CALCIUM SERPL-MCNC: 8.2 MG/DL — LOW (ref 8.5–10.1)
CHLORIDE SERPL-SCNC: 112 MMOL/L — HIGH (ref 96–108)
CO2 BLDA-SCNC: 28 MMOL/L — HIGH (ref 19–24)
CO2 SERPL-SCNC: 24 MMOL/L — SIGNIFICANT CHANGE UP (ref 22–31)
CREAT SERPL-MCNC: 1.96 MG/DL — HIGH (ref 0.5–1.3)
EGFR: 31 ML/MIN/1.73M2 — LOW
EOSINOPHIL # BLD AUTO: 0.12 K/UL — SIGNIFICANT CHANGE UP (ref 0–0.5)
EOSINOPHIL NFR BLD AUTO: 0.9 % — SIGNIFICANT CHANGE UP (ref 0–6)
GAS PNL BLDA: SIGNIFICANT CHANGE UP
GLUCOSE SERPL-MCNC: 301 MG/DL — HIGH (ref 70–99)
HCG SERPL-ACNC: 2 MIU/ML — SIGNIFICANT CHANGE UP
HCO3 BLDA-SCNC: 27 MMOL/L — SIGNIFICANT CHANGE UP (ref 21–28)
HCT VFR BLD CALC: 36 % — SIGNIFICANT CHANGE UP (ref 34.5–45)
HGB BLD-MCNC: 12 G/DL — SIGNIFICANT CHANGE UP (ref 11.5–15.5)
HOROWITZ INDEX BLDA+IHG-RTO: 60 — SIGNIFICANT CHANGE UP
IMM GRANULOCYTES NFR BLD AUTO: 0.3 % — SIGNIFICANT CHANGE UP (ref 0–0.9)
LYMPHOCYTES # BLD AUTO: 19.9 % — SIGNIFICANT CHANGE UP (ref 13–44)
LYMPHOCYTES # BLD AUTO: 2.55 K/UL — SIGNIFICANT CHANGE UP (ref 1–3.3)
MCHC RBC-ENTMCNC: 25.2 PG — LOW (ref 27–34)
MCHC RBC-ENTMCNC: 33.3 G/DL — SIGNIFICANT CHANGE UP (ref 32–36)
MCV RBC AUTO: 75.6 FL — LOW (ref 80–100)
MONOCYTES # BLD AUTO: 0.42 K/UL — SIGNIFICANT CHANGE UP (ref 0–0.9)
MONOCYTES NFR BLD AUTO: 3.3 % — SIGNIFICANT CHANGE UP (ref 2–14)
NEUTROPHILS # BLD AUTO: 9.65 K/UL — HIGH (ref 1.8–7.4)
NEUTROPHILS NFR BLD AUTO: 75.4 % — SIGNIFICANT CHANGE UP (ref 43–77)
NRBC # BLD: 0 /100 WBCS — SIGNIFICANT CHANGE UP (ref 0–0)
NT-PROBNP SERPL-SCNC: HIGH PG/ML (ref 0–125)
PCO2 BLDA: 51 MMHG — HIGH (ref 32–46)
PH BLDA: 7.33 — LOW (ref 7.35–7.45)
PLATELET # BLD AUTO: 210 K/UL — SIGNIFICANT CHANGE UP (ref 150–400)
PO2 BLDA: 103 MMHG — SIGNIFICANT CHANGE UP (ref 83–108)
POTASSIUM SERPL-MCNC: 4.2 MMOL/L — SIGNIFICANT CHANGE UP (ref 3.5–5.3)
POTASSIUM SERPL-SCNC: 4.2 MMOL/L — SIGNIFICANT CHANGE UP (ref 3.5–5.3)
PROT SERPL-MCNC: 7.8 GM/DL — SIGNIFICANT CHANGE UP (ref 6–8.3)
RBC # BLD: 4.76 M/UL — SIGNIFICANT CHANGE UP (ref 3.8–5.2)
RBC # FLD: 15.5 % — HIGH (ref 10.3–14.5)
SAO2 % BLDA: 99.2 % — HIGH (ref 94–98)
SODIUM SERPL-SCNC: 141 MMOL/L — SIGNIFICANT CHANGE UP (ref 135–145)
TROPONIN I, HIGH SENSITIVITY RESULT: 99.5 NG/L — HIGH
WBC # BLD: 12.81 K/UL — HIGH (ref 3.8–10.5)
WBC # FLD AUTO: 12.81 K/UL — HIGH (ref 3.8–10.5)

## 2025-01-09 PROCEDURE — 93010 ELECTROCARDIOGRAM REPORT: CPT

## 2025-01-09 PROCEDURE — 99285 EMERGENCY DEPT VISIT HI MDM: CPT | Mod: 25

## 2025-01-09 RX ORDER — HYDRALAZINE HYDROCHLORIDE 10 MG/1
10 TABLET ORAL ONCE
Refills: 0 | Status: COMPLETED | OUTPATIENT
Start: 2025-01-09 | End: 2025-01-09

## 2025-01-09 RX ORDER — FUROSEMIDE 20 MG
80 TABLET ORAL ONCE
Refills: 0 | Status: COMPLETED | OUTPATIENT
Start: 2025-01-09 | End: 2025-01-09

## 2025-01-09 RX ORDER — NITROGLYCERIN 20.8 MG/1
0.4 FILM, EXTENDED RELEASE TRANSDERMAL
Refills: 0 | Status: DISCONTINUED | OUTPATIENT
Start: 2025-01-09 | End: 2025-01-09

## 2025-01-09 RX ADMIN — Medication 80 MILLIGRAM(S): at 23:30

## 2025-01-09 NOTE — ED PROVIDER NOTE - OBJECTIVE STATEMENT
50 yo F with 1 hour of sob.  Pt. found by EMS to have sat on room air of 70%.  Pt. has a history of  ROS: negative for fever, cough, headache, chest pain, abd pain, nausea, vomiting, diarrhea, rash, paresthesia, and weakness--all other systems reviewed are negative.   PMH: HTN, HLD, CKD, DM, Chronic combined systolic and diastolic CHF; Meds: See EMR for list; SH: Denies smoking/drinking/drug use 50 yo F with 1 hour of sob.  Pt. found by EMS to have sat on room air of 70%.  Pt. has a history of CHF, feels like exacerbation.  No other complaints at this time.  No specific inciting event.   ROS: negative for fever, cough, headache, chest pain, abd pain, nausea, vomiting, diarrhea, rash, paresthesia, and focal weakness--all other systems reviewed are negative.   PMH: HTN, HLD, CKD, DM, Chronic combined systolic and diastolic CHF; Meds: See EMR for list; SH: Denies smoking/drinking/drug use

## 2025-01-09 NOTE — ED ADULT NURSE NOTE - OBJECTIVE STATEMENT
Covering for primary RNRoxy. Pt AAOx4. 49 year old female with pmh of CHF presents to ED with complaint of shortness of breath that has been ongoing for about an hour. Pt. found by EMS to have sat on room air of 70%.  Pt states feels like CHF exacerbation.  No other complaints at this time.  Patient placed on cardiac and SpO2 monitor at bedside. Respiratory at bedside placed on BiPap.     PMH: HTN, HLD, CKD, DM, Chronic combined systolic and diastolic CHF; Meds: See EMR for list; SH: Denies smoking/drinking/drug use

## 2025-01-09 NOTE — ED PROVIDER NOTE - CARE PLAN
Principal Discharge DX:	CHF exacerbation   1 Principal Discharge DX:	CHF exacerbation  Secondary Diagnosis:	Elevated troponin

## 2025-01-09 NOTE — ED PROVIDER NOTE - PHYSICAL EXAMINATION
Gen: AAOx3, NAD, sitting uncomfortably in stretcher, speaking in short sentences on bipap   Head: ncat, perrla, eomi b/l  Neck: supple, no lymphadenopathy, no midline deviation  Heart: rrr, no m/r/g  Lungs: b/l rales   Abd: soft, nontender, non-distended, no rebound or guarding  Ext: no clubbing/cyanosis/edema  Neuro: sensation and muscle strength intact b/l Gen: AAOx3, NAD, sitting uncomfortably in stretcher, speaking in short sentences on bipap   Head: ncat, perrla, eomi b/l  Neck: supple, no lymphadenopathy, no midline deviation  Heart: rrr, no m/r/g  Lungs: +b/l rales   Abd: soft, nontender, non-distended, no rebound or guarding  Ext: no clubbing/cyanosis/edema  Neuro: sensation and muscle strength intact b/l

## 2025-01-10 LAB
ANION GAP SERPL CALC-SCNC: 2 MMOL/L — LOW (ref 5–17)
BUN SERPL-MCNC: 28 MG/DL — HIGH (ref 7–23)
CALCIUM SERPL-MCNC: 8.6 MG/DL — SIGNIFICANT CHANGE UP (ref 8.5–10.1)
CHLORIDE SERPL-SCNC: 113 MMOL/L — HIGH (ref 96–108)
CO2 SERPL-SCNC: 29 MMOL/L — SIGNIFICANT CHANGE UP (ref 22–31)
CREAT SERPL-MCNC: 1.96 MG/DL — HIGH (ref 0.5–1.3)
EGFR: 31 ML/MIN/1.73M2 — LOW
FLUAV AG NPH QL: SIGNIFICANT CHANGE UP
FLUBV AG NPH QL: SIGNIFICANT CHANGE UP
GLUCOSE BLDC GLUCOMTR-MCNC: 116 MG/DL — HIGH (ref 70–99)
GLUCOSE BLDC GLUCOMTR-MCNC: 160 MG/DL — HIGH (ref 70–99)
GLUCOSE SERPL-MCNC: 116 MG/DL — HIGH (ref 70–99)
HCT VFR BLD CALC: 33.8 % — LOW (ref 34.5–45)
HGB BLD-MCNC: 11.3 G/DL — LOW (ref 11.5–15.5)
MCHC RBC-ENTMCNC: 25.3 PG — LOW (ref 27–34)
MCHC RBC-ENTMCNC: 33.4 G/DL — SIGNIFICANT CHANGE UP (ref 32–36)
MCV RBC AUTO: 75.6 FL — LOW (ref 80–100)
NRBC # BLD: 0 /100 WBCS — SIGNIFICANT CHANGE UP (ref 0–0)
PLATELET # BLD AUTO: 198 K/UL — SIGNIFICANT CHANGE UP (ref 150–400)
POTASSIUM SERPL-MCNC: 4.4 MMOL/L — SIGNIFICANT CHANGE UP (ref 3.5–5.3)
POTASSIUM SERPL-SCNC: 4.4 MMOL/L — SIGNIFICANT CHANGE UP (ref 3.5–5.3)
RBC # BLD: 4.47 M/UL — SIGNIFICANT CHANGE UP (ref 3.8–5.2)
RBC # FLD: 15.4 % — HIGH (ref 10.3–14.5)
RSV RNA NPH QL NAA+NON-PROBE: SIGNIFICANT CHANGE UP
SARS-COV-2 RNA SPEC QL NAA+PROBE: SIGNIFICANT CHANGE UP
SODIUM SERPL-SCNC: 144 MMOL/L — SIGNIFICANT CHANGE UP (ref 135–145)
TROPONIN I, HIGH SENSITIVITY RESULT: 104.8 NG/L — HIGH
TROPONIN I, HIGH SENSITIVITY RESULT: 126.6 NG/L — HIGH
WBC # BLD: 11.47 K/UL — HIGH (ref 3.8–10.5)
WBC # FLD AUTO: 11.47 K/UL — HIGH (ref 3.8–10.5)

## 2025-01-10 PROCEDURE — 71045 X-RAY EXAM CHEST 1 VIEW: CPT | Mod: 26

## 2025-01-10 PROCEDURE — 99232 SBSQ HOSP IP/OBS MODERATE 35: CPT

## 2025-01-10 PROCEDURE — 93010 ELECTROCARDIOGRAM REPORT: CPT

## 2025-01-10 RX ORDER — INSULIN GLARGINE-YFGN 100 [IU]/ML
20 INJECTION, SOLUTION SUBCUTANEOUS AT BEDTIME
Refills: 0 | Status: DISCONTINUED | OUTPATIENT
Start: 2025-01-10 | End: 2025-01-14

## 2025-01-10 RX ORDER — DEXTROSE MONOHYDRATE 25 G/50ML
25 INJECTION, SOLUTION INTRAVENOUS ONCE
Refills: 0 | Status: DISCONTINUED | OUTPATIENT
Start: 2025-01-10 | End: 2025-01-14

## 2025-01-10 RX ORDER — SACUBITRIL AND VALSARTAN 24; 26 MG/1; MG/1
1 TABLET, FILM COATED ORAL
Refills: 0 | DISCHARGE

## 2025-01-10 RX ORDER — GINKGO BILOBA 40 MG
3 CAPSULE ORAL AT BEDTIME
Refills: 0 | Status: DISCONTINUED | OUTPATIENT
Start: 2025-01-10 | End: 2025-01-14

## 2025-01-10 RX ORDER — ONDANSETRON 4 MG/1
4 TABLET ORAL EVERY 8 HOURS
Refills: 0 | Status: DISCONTINUED | OUTPATIENT
Start: 2025-01-10 | End: 2025-01-14

## 2025-01-10 RX ORDER — LABETALOL HCL 300 MG/1
20 TABLET, FILM COATED ORAL ONCE
Refills: 0 | Status: DISCONTINUED | OUTPATIENT
Start: 2025-01-10 | End: 2025-01-10

## 2025-01-10 RX ORDER — MAG HYDROX/ALUMINUM HYD/SIMETH 200-200-20
30 SUSPENSION, ORAL (FINAL DOSE FORM) ORAL EVERY 4 HOURS
Refills: 0 | Status: DISCONTINUED | OUTPATIENT
Start: 2025-01-10 | End: 2025-01-14

## 2025-01-10 RX ORDER — INSULIN LISPRO 100/ML
VIAL (ML) SUBCUTANEOUS AT BEDTIME
Refills: 0 | Status: DISCONTINUED | OUTPATIENT
Start: 2025-01-10 | End: 2025-01-14

## 2025-01-10 RX ORDER — SODIUM CHLORIDE 9 MG/ML
1000 INJECTION, SOLUTION INTRAVENOUS
Refills: 0 | Status: DISCONTINUED | OUTPATIENT
Start: 2025-01-10 | End: 2025-01-14

## 2025-01-10 RX ORDER — GLUCAGON INJECTION, SOLUTION 0.5 MG/.1ML
1 INJECTION, SOLUTION SUBCUTANEOUS ONCE
Refills: 0 | Status: DISCONTINUED | OUTPATIENT
Start: 2025-01-10 | End: 2025-01-14

## 2025-01-10 RX ORDER — FUROSEMIDE 20 MG
40 TABLET ORAL DAILY
Refills: 0 | Status: DISCONTINUED | OUTPATIENT
Start: 2025-01-10 | End: 2025-01-11

## 2025-01-10 RX ORDER — DEXTROSE MONOHYDRATE 25 G/50ML
12.5 INJECTION, SOLUTION INTRAVENOUS ONCE
Refills: 0 | Status: DISCONTINUED | OUTPATIENT
Start: 2025-01-10 | End: 2025-01-14

## 2025-01-10 RX ORDER — CARVEDILOL 25 MG/1
25 TABLET, FILM COATED ORAL EVERY 12 HOURS
Refills: 0 | Status: DISCONTINUED | OUTPATIENT
Start: 2025-01-10 | End: 2025-01-14

## 2025-01-10 RX ORDER — ACETAMINOPHEN 80 MG/.8ML
650 SOLUTION/ DROPS ORAL EVERY 6 HOURS
Refills: 0 | Status: DISCONTINUED | OUTPATIENT
Start: 2025-01-10 | End: 2025-01-14

## 2025-01-10 RX ORDER — CARVEDILOL 25 MG/1
1 TABLET, FILM COATED ORAL
Refills: 0 | DISCHARGE

## 2025-01-10 RX ORDER — HEPARIN SODIUM 1000 [USP'U]/ML
5000 INJECTION, SOLUTION INTRAVENOUS; SUBCUTANEOUS EVERY 8 HOURS
Refills: 0 | Status: DISCONTINUED | OUTPATIENT
Start: 2025-01-10 | End: 2025-01-14

## 2025-01-10 RX ORDER — SACUBITRIL AND VALSARTAN 24; 26 MG/1; MG/1
1 TABLET, FILM COATED ORAL
Refills: 0 | Status: DISCONTINUED | OUTPATIENT
Start: 2025-01-10 | End: 2025-01-14

## 2025-01-10 RX ORDER — INSULIN LISPRO 100/ML
VIAL (ML) SUBCUTANEOUS
Refills: 0 | Status: DISCONTINUED | OUTPATIENT
Start: 2025-01-10 | End: 2025-01-14

## 2025-01-10 RX ORDER — DEXTROSE MONOHYDRATE 25 G/50ML
15 INJECTION, SOLUTION INTRAVENOUS ONCE
Refills: 0 | Status: DISCONTINUED | OUTPATIENT
Start: 2025-01-10 | End: 2025-01-14

## 2025-01-10 RX ORDER — ASPIRIN 81 MG
81 TABLET, DELAYED RELEASE (ENTERIC COATED) ORAL DAILY
Refills: 0 | Status: DISCONTINUED | OUTPATIENT
Start: 2025-01-10 | End: 2025-01-14

## 2025-01-10 RX ADMIN — HEPARIN SODIUM 5000 UNIT(S): 1000 INJECTION, SOLUTION INTRAVENOUS; SUBCUTANEOUS at 13:31

## 2025-01-10 RX ADMIN — CARVEDILOL 25 MILLIGRAM(S): 25 TABLET, FILM COATED ORAL at 17:33

## 2025-01-10 RX ADMIN — Medication 40 MILLIGRAM(S): at 08:48

## 2025-01-10 RX ADMIN — SACUBITRIL AND VALSARTAN 1 TABLET(S): 24; 26 TABLET, FILM COATED ORAL at 17:33

## 2025-01-10 RX ADMIN — Medication 81 MILLIGRAM(S): at 11:35

## 2025-01-10 RX ADMIN — Medication 10 MILLIGRAM(S): at 06:38

## 2025-01-10 NOTE — H&P ADULT - HISTORY OF PRESENT ILLNESS
50 y/o female with PMHx of HFrEF (EF 45-50%), HTN, CKD, DM T2 presented brought in by ambulance for evaluation of SOB. Patient was found to have oxygen saturation in the 70's on RA by EMS requiring BIPAP. Denies CP, cough, abd pain, N/V/D      ED course   WBC 12.81, troponin 99...>104, Cr. 1.96, ProBNP 31400, PH 7.33. PCO2 51Lasix 80 mg x1, Nitro x 1 and hydralazine x 1.   EKG revealed sinus tachycardia no ST elevation . Pt was started on BIPAP 48 y/o female with PMHx of HFrEF (EF 45-50%), HTN, CKD, DM T2 presented brought in by ambulance for evaluation of SOB x 2 days. Patient reports that her SOB started after she returned from [place] on Tuesday. Patient was found to have oxygen saturation in the 70's on RA by EMS requiring BIPAP. Denies CP, cough, abd pain, N/V/D      ED course   WBC 12.81, troponin 99...>104, Cr. 1.96, ProBNP 16708, PH 7.33. PCO2 51Lasix 80 mg x1, Nitro x 1 and hydralazine x 1.   EKG revealed sinus tachycardia no ST elevation , CXR with effusion.  Pt was started on BIPAP

## 2025-01-10 NOTE — ED ADULT NURSE REASSESSMENT NOTE - NS ED NURSE REASSESS COMMENT FT1
Pt refusing labetalol as she states she does not want to take anything other than her home meds. Dr. Burnham aware of BP at this time. Plan of care ongoing.

## 2025-01-10 NOTE — PROGRESS NOTE ADULT - ASSESSMENT
50 y/o female with PMHx of HFrEF (EF 45-50%), HTN, CKD, DM T2 presented with SOB    # Acute on chronic CHF exacerbation   - Admit to tele  - Strict intake and output  - IV Lasix 40 mg   - Resume home dose of Entresto   - Trend troponin    # DM  - Hold oral hypoglycemia meds while inpatient  - Pt decline insulin while inpatient   - ISS  - DM diet    # CKD  Cr. 1.96 (baseline 1.80)  - Avoid nephrotoxic drugs    # HTN  - Resume home dose of Norvasc 10 mg daily with holding parameters     # HLD  -Pt not on home meds    DVT ppx  Heparin 5000 units q8hrs       Code status: full code     Case discussed with Dr. Burnham 50 y/o female with PMHx of HFrEF (EF 45-50%), HTN, CKD, DM T2 presented with SOB    # Acute on chronic CHF exacerbation   - Strict intake and output  - IV Lasix 40 mg daily  - Resume home dose of Entresto   - Trend troponin    # DM  - Hold oral hypoglycemia meds while inpatient  - Pt decline insulin while inpatient   - ISS  - DM diet    # CKD  Cr. 1.96 (baseline 1.80)  - Avoid nephrotoxic drugs    # HTN  - Resume home dose of Norvasc 10 mg daily with holding parameters     # HLD  -Pt not on home meds    DVT ppx  Heparin 5000 units q8hrs       Code status: full code

## 2025-01-10 NOTE — H&P ADULT - ASSESSMENT
48 y/o female with PMHx of HFrEF (EF 45-50%), HTN, CKD, DM T2 presented with SOB    # Acute on chronic CHF exacerbation   - Admit to tele  - Strict intake and output  - IV Lasix  - Resume home dose of Entresto   - DES      # DM  - ISS  - Lantus 20 units daily   - DM diet    # CKD  Cr. 1.96 (baseline 1.80)  - Avoid nephrotoxic drugs    # HTN  - Resume home dose of Norvasc 10 mg daily with holding parameters     # HLD  - Lipitor 20 mg qhs    DVT ppx      Code status: full code     Case discussed with Dr. Burnham 50 y/o female with PMHx of HFrEF (EF 45-50%), HTN, CKD, DM T2 presented with SOB    # Acute on chronic CHF exacerbation   - Admit to tele  - Strict intake and output  - IV Lasix 40 mg   - Resume home dose of Entresto   - DES    # DM  - Hold oral hypoglycemia meds while inpatient  - Pt decline insulin while inpatient   - ISS  - DM diet    # CKD  Cr. 1.96 (baseline 1.80)  - Avoid nephrotoxic drugs    # HTN  - Resume home dose of Norvasc 10 mg daily with holding parameters     # HLD  -Pt not on home meds    DVT ppx  Heparin 5000 units q8hrs       Code status: full code     Case discussed with Dr. Burnham 50 y/o female with PMHx of HFrEF (EF 45-50%), HTN, CKD, DM T2 presented with SOB    # Acute on chronic CHF exacerbation   - Admit to tele  - Strict intake and output  - IV Lasix 40 mg   - Resume home dose of Entresto   - Trend troponin    # DM  - Hold oral hypoglycemia meds while inpatient  - Pt decline insulin while inpatient   - ISS  - DM diet    # CKD  Cr. 1.96 (baseline 1.80)  - Avoid nephrotoxic drugs    # HTN  - Resume home dose of Norvasc 10 mg daily with holding parameters     # HLD  -Pt not on home meds    DVT ppx  Heparin 5000 units q8hrs       Code status: full code     Case discussed with Dr. Burnham

## 2025-01-10 NOTE — PROGRESS NOTE ADULT - SUBJECTIVE AND OBJECTIVE BOX
Pt seen and examined at bedside. No acute events overnight. AAOx3. She was transitioned from BIPAP to nasal cannula with good O2 sats. SOB has improved. She denies fever, chills, cough, GI/ sx or any other active complaints.

## 2025-01-10 NOTE — H&P ADULT - LYMPHATIC
Nice response to prednisone 20mg bid but took for 3day course- repeat for one week course. Back to allergist if it comes back after that.    No lymphadedenopathy

## 2025-01-10 NOTE — H&P ADULT - NSHPPHYSICALEXAM_GEN_ALL_CORE
Vital Signs Last 24 Hrs  HR: 84 (10 Andrew 2025 03:47) (84 - 115)  BP: 141/87 (10 Andrew 2025 03:47) (125/80 - 141/87)  RR: 21 (10 Andrew 2025 03:47) (21 - 26)  SpO2: 100% (10 Andrew 2025 03:47) (98% - 100%)    Parameters below as of 10 Andrew 2025 03:47  Patient On (Oxygen Delivery Method): BiPAP/CPAP

## 2025-01-10 NOTE — H&P ADULT - CONSTITUTIONAL
Detail Level: Zone
Plan: Neutrogena TSAL shampoo twice a week\\nKetoconazole shampoo and cream 2-3 times a week
normal/well-groomed/no distress

## 2025-01-10 NOTE — PATIENT PROFILE ADULT - FALL HARM RISK - HARM RISK INTERVENTIONS

## 2025-01-11 LAB
A1C WITH ESTIMATED AVERAGE GLUCOSE RESULT: 8 % — HIGH (ref 4–5.6)
ALBUMIN SERPL ELPH-MCNC: 2.6 G/DL — LOW (ref 3.3–5)
ALP SERPL-CCNC: 105 U/L — SIGNIFICANT CHANGE UP (ref 40–120)
ALT FLD-CCNC: 19 U/L — SIGNIFICANT CHANGE UP (ref 12–78)
ANION GAP SERPL CALC-SCNC: 5 MMOL/L — SIGNIFICANT CHANGE UP (ref 5–17)
AST SERPL-CCNC: 14 U/L — LOW (ref 15–37)
BASOPHILS # BLD AUTO: 0.03 K/UL — SIGNIFICANT CHANGE UP (ref 0–0.2)
BASOPHILS NFR BLD AUTO: 0.4 % — SIGNIFICANT CHANGE UP (ref 0–2)
BILIRUB SERPL-MCNC: 0.6 MG/DL — SIGNIFICANT CHANGE UP (ref 0.2–1.2)
BUN SERPL-MCNC: 28 MG/DL — HIGH (ref 7–23)
CALCIUM SERPL-MCNC: 8.3 MG/DL — LOW (ref 8.5–10.1)
CHLORIDE SERPL-SCNC: 109 MMOL/L — HIGH (ref 96–108)
CO2 SERPL-SCNC: 28 MMOL/L — SIGNIFICANT CHANGE UP (ref 22–31)
CREAT SERPL-MCNC: 2.26 MG/DL — HIGH (ref 0.5–1.3)
EGFR: 26 ML/MIN/1.73M2 — LOW
EOSINOPHIL # BLD AUTO: 0.09 K/UL — SIGNIFICANT CHANGE UP (ref 0–0.5)
EOSINOPHIL NFR BLD AUTO: 1.1 % — SIGNIFICANT CHANGE UP (ref 0–6)
ESTIMATED AVERAGE GLUCOSE: 183 MG/DL — HIGH (ref 68–114)
GLUCOSE BLDC GLUCOMTR-MCNC: 165 MG/DL — HIGH (ref 70–99)
GLUCOSE SERPL-MCNC: 183 MG/DL — HIGH (ref 70–99)
HCT VFR BLD CALC: 29.8 % — LOW (ref 34.5–45)
HGB BLD-MCNC: 10 G/DL — LOW (ref 11.5–15.5)
IMM GRANULOCYTES NFR BLD AUTO: 0.2 % — SIGNIFICANT CHANGE UP (ref 0–0.9)
LYMPHOCYTES # BLD AUTO: 2.72 K/UL — SIGNIFICANT CHANGE UP (ref 1–3.3)
LYMPHOCYTES # BLD AUTO: 32.4 % — SIGNIFICANT CHANGE UP (ref 13–44)
MCHC RBC-ENTMCNC: 25.2 PG — LOW (ref 27–34)
MCHC RBC-ENTMCNC: 33.6 G/DL — SIGNIFICANT CHANGE UP (ref 32–36)
MCV RBC AUTO: 75.1 FL — LOW (ref 80–100)
MONOCYTES # BLD AUTO: 0.41 K/UL — SIGNIFICANT CHANGE UP (ref 0–0.9)
MONOCYTES NFR BLD AUTO: 4.9 % — SIGNIFICANT CHANGE UP (ref 2–14)
NEUTROPHILS # BLD AUTO: 5.13 K/UL — SIGNIFICANT CHANGE UP (ref 1.8–7.4)
NEUTROPHILS NFR BLD AUTO: 61 % — SIGNIFICANT CHANGE UP (ref 43–77)
NRBC # BLD: 0 /100 WBCS — SIGNIFICANT CHANGE UP (ref 0–0)
PLATELET # BLD AUTO: 181 K/UL — SIGNIFICANT CHANGE UP (ref 150–400)
POTASSIUM SERPL-MCNC: 3.4 MMOL/L — LOW (ref 3.5–5.3)
POTASSIUM SERPL-SCNC: 3.4 MMOL/L — LOW (ref 3.5–5.3)
PROT SERPL-MCNC: 6.9 GM/DL — SIGNIFICANT CHANGE UP (ref 6–8.3)
RBC # BLD: 3.97 M/UL — SIGNIFICANT CHANGE UP (ref 3.8–5.2)
RBC # FLD: 15.4 % — HIGH (ref 10.3–14.5)
SODIUM SERPL-SCNC: 142 MMOL/L — SIGNIFICANT CHANGE UP (ref 135–145)
TROPONIN I, HIGH SENSITIVITY RESULT: 107.4 NG/L — HIGH
WBC # BLD: 8.4 K/UL — SIGNIFICANT CHANGE UP (ref 3.8–10.5)
WBC # FLD AUTO: 8.4 K/UL — SIGNIFICANT CHANGE UP (ref 3.8–10.5)

## 2025-01-11 PROCEDURE — 99232 SBSQ HOSP IP/OBS MODERATE 35: CPT

## 2025-01-11 RX ORDER — FUROSEMIDE 20 MG
40 TABLET ORAL DAILY
Refills: 0 | Status: DISCONTINUED | OUTPATIENT
Start: 2025-01-12 | End: 2025-01-13

## 2025-01-11 RX ADMIN — Medication 40 MILLIGRAM(S): at 05:25

## 2025-01-11 RX ADMIN — Medication 10 MILLIGRAM(S): at 05:25

## 2025-01-11 RX ADMIN — CARVEDILOL 25 MILLIGRAM(S): 25 TABLET, FILM COATED ORAL at 05:25

## 2025-01-11 RX ADMIN — SACUBITRIL AND VALSARTAN 1 TABLET(S): 24; 26 TABLET, FILM COATED ORAL at 05:25

## 2025-01-11 RX ADMIN — CARVEDILOL 25 MILLIGRAM(S): 25 TABLET, FILM COATED ORAL at 17:25

## 2025-01-11 RX ADMIN — SACUBITRIL AND VALSARTAN 1 TABLET(S): 24; 26 TABLET, FILM COATED ORAL at 17:25

## 2025-01-11 RX ADMIN — Medication 81 MILLIGRAM(S): at 11:36

## 2025-01-11 RX ADMIN — HEPARIN SODIUM 5000 UNIT(S): 1000 INJECTION, SOLUTION INTRAVENOUS; SUBCUTANEOUS at 15:10

## 2025-01-11 NOTE — PROGRESS NOTE ADULT - SUBJECTIVE AND OBJECTIVE BOX
Pt seen and examined at bedside. No acute events overnight. AAOx3. She was transitioned from BIPAP to nasal cannula with good O2 sats. SOB has improved. She denies fever, chills, cough, GI/ sx or any other active complaints.  Pt seen and examined at bedside. No acute events overnight. AAOx3. Now on room air with good O2 sats. SOB has improved. She denies fever, chills, cough, GI/ sx or any other active complaints.   Vital Signs Last 24 Hrs  T(C): 37.3 (11 Jan 2025 10:06), Max: 37.3 (11 Jan 2025 10:06)  T(F): 99.2 (11 Jan 2025 10:06), Max: 99.2 (11 Jan 2025 10:06)  HR: 86 (11 Jan 2025 10:06) (86 - 100)  BP: 136/83 (11 Jan 2025 10:06) (135/73 - 143/92)  BP(mean): --  RR: 18 (11 Jan 2025 10:06) (18 - 18)  SpO2: 97% (11 Jan 2025 10:06) (94% - 100%)    Parameters below as of 11 Jan 2025 10:06  Patient On (Oxygen Delivery Method): nasal cannula  O2 Flow (L/min): 2    GENERAL: NAD, lying in bed comfortably  HEAD:  Atraumatic, normocephalic  EYES: EOMI, PERRL  NECK: Supple, trachea midline, no JVD  HEART: Regular rate and rhythm  LUNGS: Unlabored respirations. Clear to auscultation bilaterally, no crackles, wheezing, or rhonchi  ABDOMEN: Soft, nontender, nondistended, +BS  EXTREMITIES: 2+ peripheral pulses bilaterally. No clubbing, cyanosis, or edema  NERVOUS SYSTEM:  A&Ox3, moving all extremities, no focal deficits       LABS:  cret                        10.0   8.40  )-----------( 181      ( 11 Jan 2025 09:50 )             29.8     01-11    142  |  109[H]  |  28[H]  ----------------------------<  183[H]  3.4[L]   |  28  |  2.26[H]    Ca    8.3[L]      11 Jan 2025 09:50    < from: Xray Chest 1 View- PORTABLE-Urgent (01.10.25 @ 00:45) >    Impression:      Bilateral patchy opacities may reflect pulmonary edema or pneumonia.    --- End of Report ---    < end of copied text >      TPro  6.9  /  Alb  2.6[L]  /  TBili  0.6  /  DBili  x   /  AST  14[L]  /  ALT  19  /  AlkPhos  105  01-11

## 2025-01-11 NOTE — PROGRESS NOTE ADULT - ASSESSMENT
50 y/o female with PMHx of HFrEF (EF 45-50%), HTN, CKD, DM T2 presented with SOB    # Acute on chronic CHF exacerbation   - Strict intake and output  - IV Lasix 40 mg daily  - Resume home dose of Entresto   - Trend troponin    # DM  - Hold oral hypoglycemia meds while inpatient  - Pt decline insulin while inpatient   - ISS  - DM diet    # CKD  Cr. 1.96 (baseline 1.80)  - Avoid nephrotoxic drugs    # HTN  - Resume home dose of Norvasc 10 mg daily with holding parameters     # HLD  -Pt not on home meds    DVT ppx  Heparin 5000 units q8hrs       Code status: full code    48 y/o female with PMHx of HFrEF (EF 45-50%), HTN, CKD, DM T2 presented with SOB    # Acute on chronic CHF exacerbation   - Strict intake and output  - IV Lasix 40 mg daily  - Resume home dose of Entresto   - Trend troponin  - now on room air with good o2 sats  - f/u ambulatory pulse ox    # DM  - Hold oral hypoglycemia meds while inpatient  - Pt decline insulin while inpatient   - ISS  - DM diet    # CKD  Cr. 1.96 (baseline 1.80)  - Avoid nephrotoxic drugs    # HTN  - Resume home dose of Norvasc 10 mg daily with holding parameters     # HLD  -Pt not on home meds    DVT ppx  Heparin 5000 units q8hrs       Code status: full code

## 2025-01-12 LAB — GLUCOSE BLDC GLUCOMTR-MCNC: 159 MG/DL — HIGH (ref 70–99)

## 2025-01-12 PROCEDURE — 99232 SBSQ HOSP IP/OBS MODERATE 35: CPT

## 2025-01-12 RX ADMIN — SACUBITRIL AND VALSARTAN 1 TABLET(S): 24; 26 TABLET, FILM COATED ORAL at 17:22

## 2025-01-12 RX ADMIN — Medication 81 MILLIGRAM(S): at 11:40

## 2025-01-12 RX ADMIN — Medication 40 MILLIGRAM(S): at 06:23

## 2025-01-12 RX ADMIN — CARVEDILOL 25 MILLIGRAM(S): 25 TABLET, FILM COATED ORAL at 17:22

## 2025-01-12 RX ADMIN — CARVEDILOL 25 MILLIGRAM(S): 25 TABLET, FILM COATED ORAL at 06:23

## 2025-01-12 RX ADMIN — SACUBITRIL AND VALSARTAN 1 TABLET(S): 24; 26 TABLET, FILM COATED ORAL at 06:23

## 2025-01-12 RX ADMIN — Medication 10 MILLIGRAM(S): at 06:24

## 2025-01-12 NOTE — PROGRESS NOTE ADULT - SUBJECTIVE AND OBJECTIVE BOX
Pt seen and examined at bedside. No acute events overnight. AAOx3. Now on room air with good O2 sats. SOB has improved. She denies fever, chills, cough, GI/ sx or any other active complaints.   Vital Signs Last 24 Hrs  T(C): 37.3 (11 Jan 2025 10:06), Max: 37.3 (11 Jan 2025 10:06)  T(F): 99.2 (11 Jan 2025 10:06), Max: 99.2 (11 Jan 2025 10:06)  HR: 86 (11 Jan 2025 10:06) (86 - 100)  BP: 136/83 (11 Jan 2025 10:06) (135/73 - 143/92)  BP(mean): --  RR: 18 (11 Jan 2025 10:06) (18 - 18)  SpO2: 97% (11 Jan 2025 10:06) (94% - 100%)    Parameters below as of 11 Jan 2025 10:06  Patient On (Oxygen Delivery Method): nasal cannula  O2 Flow (L/min): 2    GENERAL: NAD, lying in bed comfortably  HEAD:  Atraumatic, normocephalic  EYES: EOMI, PERRL  NECK: Supple, trachea midline, no JVD  HEART: Regular rate and rhythm  LUNGS: Unlabored respirations. Clear to auscultation bilaterally, no crackles, wheezing, or rhonchi  ABDOMEN: Soft, nontender, nondistended, +BS  EXTREMITIES: 2+ peripheral pulses bilaterally. No clubbing, cyanosis, or edema  NERVOUS SYSTEM:  A&Ox3, moving all extremities, no focal deficits       LABS:  cret                        10.0   8.40  )-----------( 181      ( 11 Jan 2025 09:50 )             29.8     01-11    142  |  109[H]  |  28[H]  ----------------------------<  183[H]  3.4[L]   |  28  |  2.26[H]    Ca    8.3[L]      11 Jan 2025 09:50    < from: Xray Chest 1 View- PORTABLE-Urgent (01.10.25 @ 00:45) >    Impression:      Bilateral patchy opacities may reflect pulmonary edema or pneumonia.    --- End of Report ---    < end of copied text >      TPro  6.9  /  Alb  2.6[L]  /  TBili  0.6  /  DBili  x   /  AST  14[L]  /  ALT  19  /  AlkPhos  105  01-11         Pt seen and examined at bedside. No acute events overnight. AAOx3. Now on room air with good O2 sats. SOB has improved. She denies fever, chills, cough, GI/ sx or any other active complaints.     Vital Signs Last 24 Hrs  T(C): 36.6 (12 Jan 2025 10:11), Max: 37.1 (11 Jan 2025 15:42)  T(F): 97.8 (12 Jan 2025 10:11), Max: 98.7 (11 Jan 2025 15:42)  HR: 83 (12 Jan 2025 10:11) (79 - 107)  BP: 134/92 (12 Jan 2025 10:11) (126/88 - 163/100)  BP(mean): --  RR: 18 (12 Jan 2025 10:11) (18 - 19)  SpO2: 94% (12 Jan 2025 10:11) (92% - 100%)    Parameters below as of 11 Jan 2025 15:42  Patient On (Oxygen Delivery Method): room air    GENERAL: NAD, lying in bed comfortably  HEAD:  Atraumatic, normocephalic  EYES: EOMI, PERRL  NECK: Supple, trachea midline, no JVD  HEART: Regular rate and rhythm  LUNGS: Unlabored respirations. Clear to auscultation bilaterally, no crackles, wheezing, or rhonchi  ABDOMEN: Soft, nontender, nondistended, +BS  EXTREMITIES: 2+ peripheral pulses bilaterally. No clubbing, cyanosis, or edema  NERVOUS SYSTEM:  A&Ox3, moving all extremities, no focal deficits         LABS:  cret                        10.0   8.40  )-----------( 181      ( 11 Jan 2025 09:50 )             29.8     01-11    142  |  109[H]  |  28[H]  ----------------------------<  183[H]  3.4[L]   |  28  |  2.26[H]    Ca    8.3[L]      11 Jan 2025 09:50    TPro  6.9  /  Alb  2.6[L]  /  TBili  0.6  /  DBili  x   /  AST  14[L]  /  ALT  19  /  AlkPhos  105  01-11      < from: Xray Chest 1 View- PORTABLE-Urgent (01.10.25 @ 00:45) >    Impression:      Bilateral patchy opacities may reflect pulmonary edema or pneumonia.    --- End of Report ---    < end of copied text >

## 2025-01-12 NOTE — PHYSICAL THERAPY INITIAL EVALUATION ADULT - PERTINENT HX OF CURRENT PROBLEM, REHAB EVAL
This is the hx of M.S.  a 50 y/o female patient who was admitted to [facility] due to complications of CHF exacerbation affecting medical condition and with subsequent affection on functional mobility.

## 2025-01-12 NOTE — PHYSICAL THERAPY INITIAL EVALUATION ADULT - PHYSICAL ASSIST/NONPHYSICAL ASSIST: STAND/SIT, REHAB EVAL
No recent cancelled appointment found in chart review. Will route to PCP clinic PSR to determine if they had reached out to patient about scheduling.     Also Dr. Segal's office attempted to call patient, according to letter in chart. Will route to his office to determine if they had reached out to patient regarding scheduling.   set-up required/verbal cues

## 2025-01-12 NOTE — PHYSICAL THERAPY INITIAL EVALUATION ADULT - ADDITIONAL COMMENTS
as per patient, she lives in a  with daughter with 8 stairs to enter with B HR's (widely spread) to enter into the house. daughter will be there on and off to help her if needed. pt was independent with use of a cane.

## 2025-01-12 NOTE — PROGRESS NOTE ADULT - ASSESSMENT
48 y/o female with PMHx of HFrEF (EF 45-50%), HTN, CKD, DM T2 presented with SOB    # Acute on chronic CHF exacerbation   - Strict intake and output  - IV Lasix 40 mg daily  - Resume home dose of Entresto   - Trend troponin  - now on room air with good o2 sats  - f/u ambulatory pulse ox    # DM  - Hold oral hypoglycemia meds while inpatient  - Pt decline insulin while inpatient   - ISS  - DM diet    # CKD  Cr. 1.96 (baseline 1.80)  - Avoid nephrotoxic drugs    # HTN  - Resume home dose of Norvasc 10 mg daily with holding parameters     # HLD  -Pt not on home meds    DVT ppx  Heparin 5000 units q8hrs       Code status: full code    50 y/o female with PMHx of HFrEF (EF 45-50%), HTN, CKD, DM T2 presented with SOB    # Acute on chronic CHF exacerbation   - Strict intake and output  - IV Lasix 40 mg daily>> transitioned to 40 mg po  - Resume home dose of Entresto   - Trend troponin  - now on room air with good o2 sats  - f/u ambulatory pulse ox    # DM  - Hold oral hypoglycemia meds while inpatient  - Pt decline insulin while inpatient   - ISS  - DM diet    # CKD  renal function worsened arvind due to diuresis, changed to lasix po today  Avoid nephrotoxic drugs  ctm    # HTN  - Resume home dose of Norvasc 10 mg daily with holding parameters     # HLD  -Pt not on home meds    DVT ppx  Heparin 5000 units q8hrs       Code status: full code

## 2025-01-13 ENCOUNTER — TRANSCRIPTION ENCOUNTER (OUTPATIENT)
Age: 50
End: 2025-01-13

## 2025-01-13 DIAGNOSIS — E11.65 TYPE 2 DIABETES MELLITUS WITH HYPERGLYCEMIA: ICD-10-CM

## 2025-01-13 LAB
ANION GAP SERPL CALC-SCNC: 8 MMOL/L — SIGNIFICANT CHANGE UP (ref 5–17)
BUN SERPL-MCNC: 30 MG/DL — HIGH (ref 7–23)
CALCIUM SERPL-MCNC: 8.4 MG/DL — LOW (ref 8.5–10.1)
CHLORIDE SERPL-SCNC: 108 MMOL/L — SIGNIFICANT CHANGE UP (ref 96–108)
CO2 SERPL-SCNC: 25 MMOL/L — SIGNIFICANT CHANGE UP (ref 22–31)
CREAT SERPL-MCNC: 2.13 MG/DL — HIGH (ref 0.5–1.3)
EGFR: 28 ML/MIN/1.73M2 — LOW
GLUCOSE SERPL-MCNC: 168 MG/DL — HIGH (ref 70–99)
MAGNESIUM SERPL-MCNC: 2 MG/DL — SIGNIFICANT CHANGE UP (ref 1.6–2.6)
POTASSIUM SERPL-MCNC: 3.8 MMOL/L — SIGNIFICANT CHANGE UP (ref 3.5–5.3)
POTASSIUM SERPL-SCNC: 3.8 MMOL/L — SIGNIFICANT CHANGE UP (ref 3.5–5.3)
SODIUM SERPL-SCNC: 141 MMOL/L — SIGNIFICANT CHANGE UP (ref 135–145)

## 2025-01-13 PROCEDURE — 99238 HOSP IP/OBS DSCHRG MGMT 30/<: CPT

## 2025-01-13 PROCEDURE — 99233 SBSQ HOSP IP/OBS HIGH 50: CPT

## 2025-01-13 RX ORDER — FUROSEMIDE 20 MG
1 TABLET ORAL
Qty: 60 | Refills: 1
Start: 2025-01-13 | End: 2025-05-12

## 2025-01-13 RX ORDER — FUROSEMIDE 20 MG
1 TABLET ORAL
Refills: 0 | DISCHARGE

## 2025-01-13 RX ORDER — ASPIRIN 81 MG
1 TABLET, DELAYED RELEASE (ENTERIC COATED) ORAL
Qty: 0 | Refills: 0 | DISCHARGE
Start: 2025-01-13

## 2025-01-13 RX ORDER — ASPIRIN 81 MG
81 TABLET, DELAYED RELEASE (ENTERIC COATED) ORAL
Refills: 0 | DISCHARGE

## 2025-01-13 RX ORDER — FUROSEMIDE 20 MG
40 TABLET ORAL
Refills: 0 | Status: DISCONTINUED | OUTPATIENT
Start: 2025-01-13 | End: 2025-01-14

## 2025-01-13 RX ADMIN — Medication 40 MILLIGRAM(S): at 05:20

## 2025-01-13 RX ADMIN — CARVEDILOL 25 MILLIGRAM(S): 25 TABLET, FILM COATED ORAL at 05:20

## 2025-01-13 RX ADMIN — Medication 40 MILLIGRAM(S): at 18:00

## 2025-01-13 RX ADMIN — SACUBITRIL AND VALSARTAN 1 TABLET(S): 24; 26 TABLET, FILM COATED ORAL at 17:58

## 2025-01-13 RX ADMIN — Medication 81 MILLIGRAM(S): at 12:25

## 2025-01-13 RX ADMIN — CARVEDILOL 25 MILLIGRAM(S): 25 TABLET, FILM COATED ORAL at 17:59

## 2025-01-13 RX ADMIN — SACUBITRIL AND VALSARTAN 1 TABLET(S): 24; 26 TABLET, FILM COATED ORAL at 05:20

## 2025-01-13 RX ADMIN — Medication 10 MILLIGRAM(S): at 05:21

## 2025-01-13 NOTE — DISCHARGE NOTE NURSING/CASE MANAGEMENT/SOCIAL WORK - PATIENT PORTAL LINK FT
You can access the FollowMyHealth Patient Portal offered by [facility] by registering at the following website: [email or web address]. By joining [facility]’s FollowMyHealth portal, you will also be able to view your health information using other applications (apps) compatible with our system.

## 2025-01-13 NOTE — CHART NOTE - NSCHARTNOTEFT_GEN_A_CORE
Pt seen this AM at bedside was on RA satting well in NAD.  Pt's discharge completed and meds sent to pharmacy.   Upon prep for discharge, pt became SOB with exertion and dessated to 86% on RA. Pt placed on 2LNC with relief of hypoxemia and sob.  Bedside Lung US with bilat B lines posteriorly c/w residual pulm edema.  Restarted on BID IV lasix and will reeval her sats on RA and sx tomorrow to see if she can be discharged     d/w dr duong and pt at bedside

## 2025-01-13 NOTE — DISCHARGE NOTE PROVIDER - NSDCMRMEDTOKEN_GEN_ALL_CORE_FT
amLODIPine 10 mg oral tablet: 1 tab(s) orally once a day  aspirin 81 mg oral delayed release tablet: 1 tab(s) orally once a day  Coreg 25 mg oral tablet: 1 tab(s) orally 2 times a day  Entresto 97 mg-103 mg oral tablet: 1 tab(s) orally 2 times a day  furosemide 40 mg oral tablet: 1 tab(s) orally once a day  Jardiance 25 mg oral tablet: 1 tab(s) orally once a day  Tradjenta 5 mg oral tablet: 1 tab(s) orally once a day

## 2025-01-13 NOTE — DISCHARGE NOTE PROVIDER - HOSPITAL COURSE
48 y/o female with PMHx of HFrEF (EF 45-50%), HTN, CKD, DM T2 presented brought in by ambulance for evaluation of SOB x 2 days. Patient reports that her SOB started after she returned from [place] on Tuesday. Patient was found to have oxygen saturation in the 70's on RA by EMS requiring BIPAP. Denies CP, cough, abd pain, N/V/D. Pt admitted to medicine for ADHF and improved with IV diuresis. Pt's GDMT restarted and tolerated well. Pt transitioned from bipap to NC to RA. Case and plan discussed with dr duong and pt at bedside on 1/13/25 and all in agreement with d/c home with home PT and outpt cardio follow up.    Dx:  ADHF  hypoxic respiratory failure  hypokalemia   DAVID on CKD  pulmonary edema  uncontrolled diabetes  demand ischemia     time spent on d/c: 37 min

## 2025-01-13 NOTE — DIETITIAN INITIAL EVALUATION ADULT - PERTINENT MEDS FT
MEDICATIONS  (STANDING):  amLODIPine   Tablet 10 milliGRAM(s) Oral daily  aspirin enteric coated 81 milliGRAM(s) Oral daily  carvedilol 25 milliGRAM(s) Oral every 12 hours  dextrose 5%. 1000 milliLiter(s) (50 mL/Hr) IV Continuous <Continuous>  dextrose 5%. 1000 milliLiter(s) (100 mL/Hr) IV Continuous <Continuous>  dextrose 50% Injectable 25 Gram(s) IV Push once  dextrose 50% Injectable 12.5 Gram(s) IV Push once  dextrose 50% Injectable 25 Gram(s) IV Push once  furosemide    Tablet 40 milliGRAM(s) Oral daily  glucagon  Injectable 1 milliGRAM(s) IntraMuscular once  heparin   Injectable 5000 Unit(s) SubCutaneous every 8 hours  insulin glargine Injectable (LANTUS) 20 Unit(s) SubCutaneous at bedtime  insulin lispro (ADMELOG) corrective regimen sliding scale   SubCutaneous three times a day before meals  insulin lispro (ADMELOG) corrective regimen sliding scale   SubCutaneous at bedtime  sacubitril 97 mG/valsartan 103 mG 1 Tablet(s) Oral two times a day    MEDICATIONS  (PRN):  acetaminophen     Tablet .. 650 milliGRAM(s) Oral every 6 hours PRN Temp greater or equal to 38C (100.4F), Mild Pain (1 - 3)  aluminum hydroxide/magnesium hydroxide/simethicone Suspension 30 milliLiter(s) Oral every 4 hours PRN Dyspepsia  dextrose Oral Gel 15 Gram(s) Oral once PRN Blood Glucose LESS THAN 70 milliGRAM(s)/deciliter  melatonin 3 milliGRAM(s) Oral at bedtime PRN Insomnia  ondansetron Injectable 4 milliGRAM(s) IV Push every 8 hours PRN Nausea and/or Vomiting

## 2025-01-13 NOTE — DIETITIAN INITIAL EVALUATION ADULT - NSICDXPASTMEDICALHX_GEN_ALL_CORE_FT
PAST MEDICAL HISTORY:  Chronic systolic congestive heart failure     Diabetes     Hypertension     Peptic ulcer disease      n/a

## 2025-01-13 NOTE — DISCHARGE NOTE PROVIDER - NSDCCPCAREPLAN_GEN_ALL_CORE_FT
PRINCIPAL DISCHARGE DIAGNOSIS  Diagnosis: CHF exacerbation  Assessment and Plan of Treatment: You had heart failure exacerbation which required bipap face mask for breathing support and IV lasix. You improved with iv lasix and were able to come off all oxygen support. Continue your heart failure medications and oral lasix daily and follow up with your cardiologist in the next week. Return to the hospital if you have any chest pain, shortness of breath, dizziness/lightheadedness, or syncope      SECONDARY DISCHARGE DIAGNOSES  Diagnosis: Elevated troponin  Assessment and Plan of Treatment: Your cardiac muscle enzymes were elevated most likely from the stress of your heart failure exacerbation. Your EKG did not show any evidence of an acute heart attack and the level downtrended with treat ment of your heart failure. Follow up with your cardiologist for further ischemic work up.

## 2025-01-13 NOTE — DIETITIAN INITIAL EVALUATION ADULT - OTHER INFO
Pt short with answers during interview; mostly nodded and shook head while watching show on cell phone.   Reports good appetite and PO intake PTA and during LOS. States no diet restrictions PTA and no known food allergies. Denies difficulty chewing/swallowing. Reports no weight changes. Noted weight stable as per previous RD note (09/25/2024) when pt weighed 99.3kg.  Pt with T2DM; confirms Tradjenta and Jardiance used at home as per H&P. HbA1c 8.0% indicates fair blood glucose management. Improved since last admission as per previous RD note (09/25/24) when HbA1c was 10.4%.  Pt declined nutrition education at this time. Made aware RD remains available

## 2025-01-13 NOTE — DISCHARGE NOTE PROVIDER - PROVIDER TOKENS
FREE:[LAST:[[place]],FIRST:[judith],PHONE:[phone]],FAX:[(   )    -],ADDRESS:[[address] [place] [address]],FOLLOWUP:[1 week]],FREE:[LAST:[pcp],PHONE:[(   )    -],FAX:[(   )    -]]

## 2025-01-13 NOTE — DISCHARGE NOTE NURSING/CASE MANAGEMENT/SOCIAL WORK - FINANCIAL ASSISTANCE
[facility] provides services at a reduced cost to those who are determined to be eligible through [facility]’s financial assistance program. Information regarding [facility]’s financial assistance program can be found by going to [email or web address] or by calling [phone].

## 2025-01-13 NOTE — DISCHARGE NOTE PROVIDER - CARE PROVIDER_API CALL
judith ho  [address] [place] [address]  Phone: [phone]  Fax: (   )    -  Follow Up Time: 1 week    pcp,   Phone: (   )    -  Fax: (   )    -  Follow Up Time:

## 2025-01-13 NOTE — DISCHARGE NOTE NURSING/CASE MANAGEMENT/SOCIAL WORK - NSDCPEFALRISK_GEN_ALL_CORE
For information on Fall & Injury Prevention, visit: [email or web address] OR  [email or web address] OR  https://www.cdc.gov/steadi/patient.html

## 2025-01-13 NOTE — DIETITIAN INITIAL EVALUATION ADULT - PERTINENT LABORATORY DATA
01-13    141  |  108  |  30[H]  ----------------------------<  168[H]  3.8   |  25  |  2.13[H]    Ca    8.4[L]      13 Jan 2025 08:05  Mg     2.0     01-13    A1C with Estimated Average Glucose Result: 8.0 % (01-11-25 @ 09:50)  A1C with Estimated Average Glucose Result: 10.4 % (09-24-24 @ 07:50)

## 2025-01-14 VITALS
RESPIRATION RATE: 18 BRPM | OXYGEN SATURATION: 98 % | HEART RATE: 86 BPM | TEMPERATURE: 98 F | SYSTOLIC BLOOD PRESSURE: 145 MMHG | DIASTOLIC BLOOD PRESSURE: 97 MMHG

## 2025-01-14 LAB
ALBUMIN SERPL ELPH-MCNC: 3.1 G/DL — LOW (ref 3.3–5)
ALP SERPL-CCNC: 118 U/L — SIGNIFICANT CHANGE UP (ref 40–120)
ALT FLD-CCNC: 19 U/L — SIGNIFICANT CHANGE UP (ref 12–78)
ANION GAP SERPL CALC-SCNC: 6 MMOL/L — SIGNIFICANT CHANGE UP (ref 5–17)
AST SERPL-CCNC: 16 U/L — SIGNIFICANT CHANGE UP (ref 15–37)
BILIRUB SERPL-MCNC: 0.6 MG/DL — SIGNIFICANT CHANGE UP (ref 0.2–1.2)
BUN SERPL-MCNC: 31 MG/DL — HIGH (ref 7–23)
CALCIUM SERPL-MCNC: 8.6 MG/DL — SIGNIFICANT CHANGE UP (ref 8.5–10.1)
CHLORIDE SERPL-SCNC: 108 MMOL/L — SIGNIFICANT CHANGE UP (ref 96–108)
CO2 SERPL-SCNC: 26 MMOL/L — SIGNIFICANT CHANGE UP (ref 22–31)
CREAT SERPL-MCNC: 2.22 MG/DL — HIGH (ref 0.5–1.3)
EGFR: 27 ML/MIN/1.73M2 — LOW
GLUCOSE SERPL-MCNC: 175 MG/DL — HIGH (ref 70–99)
HCT VFR BLD CALC: 32.1 % — LOW (ref 34.5–45)
HGB BLD-MCNC: 10.9 G/DL — LOW (ref 11.5–15.5)
MAGNESIUM SERPL-MCNC: 2 MG/DL — SIGNIFICANT CHANGE UP (ref 1.6–2.6)
MCHC RBC-ENTMCNC: 25.3 PG — LOW (ref 27–34)
MCHC RBC-ENTMCNC: 34 G/DL — SIGNIFICANT CHANGE UP (ref 32–36)
MCV RBC AUTO: 74.5 FL — LOW (ref 80–100)
NRBC # BLD: 0 /100 WBCS — SIGNIFICANT CHANGE UP (ref 0–0)
PLATELET # BLD AUTO: 196 K/UL — SIGNIFICANT CHANGE UP (ref 150–400)
POTASSIUM SERPL-MCNC: 3.7 MMOL/L — SIGNIFICANT CHANGE UP (ref 3.5–5.3)
POTASSIUM SERPL-SCNC: 3.7 MMOL/L — SIGNIFICANT CHANGE UP (ref 3.5–5.3)
PROT SERPL-MCNC: 7.7 GM/DL — SIGNIFICANT CHANGE UP (ref 6–8.3)
RBC # BLD: 4.31 M/UL — SIGNIFICANT CHANGE UP (ref 3.8–5.2)
RBC # FLD: 15.2 % — HIGH (ref 10.3–14.5)
SODIUM SERPL-SCNC: 140 MMOL/L — SIGNIFICANT CHANGE UP (ref 135–145)
WBC # BLD: 7.15 K/UL — SIGNIFICANT CHANGE UP (ref 3.8–10.5)
WBC # FLD AUTO: 7.15 K/UL — SIGNIFICANT CHANGE UP (ref 3.8–10.5)

## 2025-01-14 PROCEDURE — 99231 SBSQ HOSP IP/OBS SF/LOW 25: CPT

## 2025-01-14 PROCEDURE — 99239 HOSP IP/OBS DSCHRG MGMT >30: CPT

## 2025-01-14 RX ORDER — IPRATROPIUM BROMIDE AND ALBUTEROL SULFATE .5; 2.5 MG/3ML; MG/3ML
3 SOLUTION RESPIRATORY (INHALATION) ONCE
Refills: 0 | Status: COMPLETED | OUTPATIENT
Start: 2025-01-14 | End: 2025-01-14

## 2025-01-14 RX ADMIN — SACUBITRIL AND VALSARTAN 1 TABLET(S): 24; 26 TABLET, FILM COATED ORAL at 05:53

## 2025-01-14 RX ADMIN — Medication 10 MILLIGRAM(S): at 05:53

## 2025-01-14 RX ADMIN — Medication 40 MILLIGRAM(S): at 05:55

## 2025-01-14 RX ADMIN — CARVEDILOL 25 MILLIGRAM(S): 25 TABLET, FILM COATED ORAL at 05:53

## 2025-01-14 RX ADMIN — IPRATROPIUM BROMIDE AND ALBUTEROL SULFATE 3 MILLILITER(S): .5; 2.5 SOLUTION RESPIRATORY (INHALATION) at 06:18

## 2025-01-14 NOTE — PROGRESS NOTE ADULT - SUBJECTIVE AND OBJECTIVE BOX
Patient is a 49y old  Female who presents with a chief complaint of CHF exacerbation  (2025 11:18)    INTERVAL HPI/OVERNIGHT EVENTS: Patients seen and examined at bedside this morning. No acute events overnight. Pt not compliant with oxygen at bedside or overnight.    MEDICATIONS  (STANDING):  amLODIPine   Tablet 10 milliGRAM(s) Oral daily  aspirin enteric coated 81 milliGRAM(s) Oral daily  carvedilol 25 milliGRAM(s) Oral every 12 hours  dextrose 5%. 1000 milliLiter(s) (50 mL/Hr) IV Continuous <Continuous>  dextrose 5%. 1000 milliLiter(s) (100 mL/Hr) IV Continuous <Continuous>  dextrose 50% Injectable 25 Gram(s) IV Push once  dextrose 50% Injectable 12.5 Gram(s) IV Push once  dextrose 50% Injectable 25 Gram(s) IV Push once  furosemide   Injectable 40 milliGRAM(s) IV Push two times a day  glucagon  Injectable 1 milliGRAM(s) IntraMuscular once  heparin   Injectable 5000 Unit(s) SubCutaneous every 8 hours  insulin glargine Injectable (LANTUS) 20 Unit(s) SubCutaneous at bedtime  insulin lispro (ADMELOG) corrective regimen sliding scale   SubCutaneous three times a day before meals  insulin lispro (ADMELOG) corrective regimen sliding scale   SubCutaneous at bedtime  sacubitril 97 mG/valsartan 103 mG 1 Tablet(s) Oral two times a day    MEDICATIONS  (PRN):  acetaminophen     Tablet .. 650 milliGRAM(s) Oral every 6 hours PRN Temp greater or equal to 38C (100.4F), Mild Pain (1 - 3)  aluminum hydroxide/magnesium hydroxide/simethicone Suspension 30 milliLiter(s) Oral every 4 hours PRN Dyspepsia  dextrose Oral Gel 15 Gram(s) Oral once PRN Blood Glucose LESS THAN 70 milliGRAM(s)/deciliter  melatonin 3 milliGRAM(s) Oral at bedtime PRN Insomnia  ondansetron Injectable 4 milliGRAM(s) IV Push every 8 hours PRN Nausea and/or Vomiting    Allergies    lisinopril (Other)    Intolerances      REVIEW OF SYSTEMS:  All other systems reviewed and are negative    Vital Signs Last 24 Hrs  T(C): 36.4 (2025 11:02), Max: 37.1 (2025 16:08)  T(F): 97.5 (2025 11:02), Max: 98.7 (2025 16:08)  HR: 83 (2025 11:02) (77 - 93)  BP: 117/74 (2025 11:02) (117/74 - 154/91)  BP(mean): --  RR: 18 (2025 11:02) (17 - 20)  SpO2: 97% (2025 11:02) (92% - 97%)    Parameters below as of 2025 11:02  Patient On (Oxygen Delivery Method): nasal cannula      Daily     Daily Weight in k.6 (2025 04:42)  I&O's Summary    2025 07:01  -  2025 07:00  --------------------------------------------------------  IN: 780 mL / OUT: 0 mL / NET: 780 mL    2025 07:01  -  2025 13:42  --------------------------------------------------------  IN: 840 mL / OUT: 0 mL / NET: 840 mL      CAPILLARY BLOOD GLUCOSE    PHYSICAL EXAM:  GENERAL: NAD, lying in bed comfortably  HEAD:  Atraumatic, normocephalic  EYES: EOMI, PERRL  NECK: Supple, trachea midline, no JVD  HEART: Regular rate and rhythm  LUNGS: Unlabored respirations. Clear to auscultation bilaterally, no crackles, wheezing, or rhonchi  ABDOMEN: Soft, nontender, nondistended, +BS  EXTREMITIES: 2+ peripheral pulses bilaterally. No clubbing, cyanosis, or edema  NERVOUS SYSTEM:  A&Ox3, moving all extremities, no focal deficits     Labs                          10.9   7.15  )-----------( 196      ( 2025 10:15 )             32.1     01-14    140  |  108  |  31[H]  ----------------------------<  175[H]  3.7   |  26  |  2.22[H]    Ca    8.6      2025 10:15  Mg     2.0         TPro  7.7  /  Alb  3.1[L]  /  TBili  0.6  /  DBili  x   /  AST  16  /  ALT  19  /  AlkPhos  118            Urinalysis Basic - ( 2025 10:15 )    Color: x / Appearance: x / SG: x / pH: x  Gluc: 175 mg/dL / Ketone: x  / Bili: x / Urobili: x   Blood: x / Protein: x / Nitrite: x   Leuk Esterase: x / RBC: x / WBC x   Sq Epi: x / Non Sq Epi: x / Bacteria: x                      Radiology and Imaging reviewed.

## 2025-01-14 NOTE — PROGRESS NOTE ADULT - ASSESSMENT
48 y/o female with PMHx of HFrEF (EF 45-50%), HTN, CKD, DM T2 presented brought in by ambulance for evaluation of SOB x 2 days. Patient reports that her SOB started after she returned from [place] on Tuesday. Patient was found to have oxygen saturation in the 70's on RA by EMS requiring BIPAP. Denies CP, cough, abd pain, N/V/D. Pt admitted to medicine for ADHF and improved with IV diuresis. Pt's GDMT restarted and tolerated well. Pt transitioned from bipap to NC to RA. Case and plan discussed with dr duong and pt at bedside on 1/13/25 and all in agreement with d/c home with home PT and outpt cardio follow up.    Dx:  ADHF  hypoxic respiratory failure  hypokalemia   DAVID on CKD  pulmonary edema  uncontrolled diabetes  demand ischemia

## 2025-01-14 NOTE — CHART NOTE - NSCHARTNOTEFT_GEN_A_CORE
Patient  is being discharged home on Oxygen. Patient is in a chronic stable state with   Acute Respiratory Failure with Hypoxia.   Pulse Ox of 86% was done on room air at rest. Patient is currently on 2LPM/24 hours via nasal cannula. Patient is mobile within the home and needs portability.

## 2025-01-16 ENCOUNTER — INPATIENT (INPATIENT)
Facility: HOSPITAL | Age: 50
LOS: 2 days | Discharge: HOME | End: 2025-01-19
Attending: INTERNAL MEDICINE | Admitting: INTERNAL MEDICINE
Payer: MEDICAID

## 2025-01-16 VITALS
OXYGEN SATURATION: 99 % | DIASTOLIC BLOOD PRESSURE: 120 MMHG | TEMPERATURE: 99 F | WEIGHT: 227.96 LBS | HEART RATE: 112 BPM | RESPIRATION RATE: 22 BRPM | HEIGHT: 68 IN | SYSTOLIC BLOOD PRESSURE: 167 MMHG

## 2025-01-16 DIAGNOSIS — E11.9 TYPE 2 DIABETES MELLITUS WITHOUT COMPLICATIONS: ICD-10-CM

## 2025-01-16 DIAGNOSIS — I50.23 ACUTE ON CHRONIC SYSTOLIC (CONGESTIVE) HEART FAILURE: ICD-10-CM

## 2025-01-16 DIAGNOSIS — Z29.9 ENCOUNTER FOR PROPHYLACTIC MEASURES, UNSPECIFIED: ICD-10-CM

## 2025-01-16 DIAGNOSIS — U07.1 COVID-19: ICD-10-CM

## 2025-01-16 DIAGNOSIS — I10 ESSENTIAL (PRIMARY) HYPERTENSION: ICD-10-CM

## 2025-01-16 DIAGNOSIS — E78.5 HYPERLIPIDEMIA, UNSPECIFIED: ICD-10-CM

## 2025-01-16 LAB
ALBUMIN SERPL ELPH-MCNC: 3.1 G/DL — LOW (ref 3.3–5)
ALP SERPL-CCNC: 117 U/L — SIGNIFICANT CHANGE UP (ref 40–120)
ALT FLD-CCNC: 21 U/L — SIGNIFICANT CHANGE UP (ref 12–78)
ANION GAP SERPL CALC-SCNC: 3 MMOL/L — LOW (ref 5–17)
AST SERPL-CCNC: 22 U/L — SIGNIFICANT CHANGE UP (ref 15–37)
BASE EXCESS BLDV CALC-SCNC: 2.9 MMOL/L — SIGNIFICANT CHANGE UP (ref -2–3)
BASOPHILS # BLD AUTO: 0.02 K/UL — SIGNIFICANT CHANGE UP (ref 0–0.2)
BASOPHILS NFR BLD AUTO: 0.2 % — SIGNIFICANT CHANGE UP (ref 0–2)
BILIRUB SERPL-MCNC: 0.7 MG/DL — SIGNIFICANT CHANGE UP (ref 0.2–1.2)
BLOOD GAS COMMENTS, VENOUS: SIGNIFICANT CHANGE UP
BUN SERPL-MCNC: 29 MG/DL — HIGH (ref 7–23)
CALCIUM SERPL-MCNC: 8.4 MG/DL — LOW (ref 8.5–10.1)
CHLORIDE BLDV-SCNC: 108 MMOL/L — HIGH (ref 98–107)
CHLORIDE SERPL-SCNC: 111 MMOL/L — HIGH (ref 96–108)
CO2 BLDV-SCNC: 31 MMOL/L — HIGH (ref 22–26)
CO2 SERPL-SCNC: 28 MMOL/L — SIGNIFICANT CHANGE UP (ref 22–31)
CREAT SERPL-MCNC: 1.95 MG/DL — HIGH (ref 0.5–1.3)
EGFR: 31 ML/MIN/1.73M2 — LOW
EOSINOPHIL # BLD AUTO: 0.07 K/UL — SIGNIFICANT CHANGE UP (ref 0–0.5)
EOSINOPHIL NFR BLD AUTO: 0.7 % — SIGNIFICANT CHANGE UP (ref 0–6)
FLUAV AG NPH QL: SIGNIFICANT CHANGE UP
FLUAV AG NPH QL: SIGNIFICANT CHANGE UP
FLUBV AG NPH QL: SIGNIFICANT CHANGE UP
FLUBV AG NPH QL: SIGNIFICANT CHANGE UP
GAS PNL BLDV: 138 MMOL/L — SIGNIFICANT CHANGE UP (ref 136–145)
GAS PNL BLDV: SIGNIFICANT CHANGE UP
GAS PNL BLDV: SIGNIFICANT CHANGE UP
GLUCOSE BLDV-MCNC: 105 MG/DL — HIGH (ref 65–95)
GLUCOSE SERPL-MCNC: 103 MG/DL — HIGH (ref 70–99)
HCG SERPL-ACNC: 1 MIU/ML — SIGNIFICANT CHANGE UP
HCO3 BLDV-SCNC: 30 MMOL/L — HIGH (ref 22–28)
HCT VFR BLD CALC: 32.2 % — LOW (ref 34.5–45)
HCT VFR BLDA CALC: 34 % — LOW (ref 37–47)
HGB BLD CALC-MCNC: 11.3 G/DL — LOW (ref 11.7–16.1)
HGB BLD-MCNC: 10.8 G/DL — LOW (ref 11.5–15.5)
HOROWITZ INDEX BLDV+IHG-RTO: 100 — SIGNIFICANT CHANGE UP
IMM GRANULOCYTES NFR BLD AUTO: 0.3 % — SIGNIFICANT CHANGE UP (ref 0–0.9)
LACTATE BLDV-MCNC: 0.7 MMOL/L — SIGNIFICANT CHANGE UP (ref 0.56–1.39)
LYMPHOCYTES # BLD AUTO: 1.19 K/UL — SIGNIFICANT CHANGE UP (ref 1–3.3)
LYMPHOCYTES # BLD AUTO: 12.6 % — LOW (ref 13–44)
MCHC RBC-ENTMCNC: 25.6 PG — LOW (ref 27–34)
MCHC RBC-ENTMCNC: 33.5 G/DL — SIGNIFICANT CHANGE UP (ref 32–36)
MCV RBC AUTO: 76.3 FL — LOW (ref 80–100)
MONOCYTES # BLD AUTO: 0.44 K/UL — SIGNIFICANT CHANGE UP (ref 0–0.9)
MONOCYTES NFR BLD AUTO: 4.6 % — SIGNIFICANT CHANGE UP (ref 2–14)
NEUTROPHILS # BLD AUTO: 7.72 K/UL — HIGH (ref 1.8–7.4)
NEUTROPHILS NFR BLD AUTO: 81.6 % — HIGH (ref 43–77)
NRBC # BLD: 0 /100 WBCS — SIGNIFICANT CHANGE UP (ref 0–0)
NRBC BLD-RTO: 0 /100 WBCS — SIGNIFICANT CHANGE UP (ref 0–0)
NT-PROBNP SERPL-SCNC: 8736 PG/ML — HIGH (ref 0–125)
PCO2 BLDV: 55 MMHG — SIGNIFICANT CHANGE UP (ref 42–55)
PH BLDV: 7.34 — SIGNIFICANT CHANGE UP (ref 7.32–7.43)
PLATELET # BLD AUTO: 195 K/UL — SIGNIFICANT CHANGE UP (ref 150–400)
PO2 BLDV: 26 MMHG — SIGNIFICANT CHANGE UP (ref 25–45)
POTASSIUM BLDV-SCNC: 5.6 MMOL/L — HIGH (ref 3.5–5.1)
POTASSIUM SERPL-MCNC: 4.3 MMOL/L — SIGNIFICANT CHANGE UP (ref 3.5–5.3)
POTASSIUM SERPL-SCNC: 4.3 MMOL/L — SIGNIFICANT CHANGE UP (ref 3.5–5.3)
PROT SERPL-MCNC: 7.8 GM/DL — SIGNIFICANT CHANGE UP (ref 6–8.3)
RBC # BLD: 4.22 M/UL — SIGNIFICANT CHANGE UP (ref 3.8–5.2)
RBC # FLD: 15.7 % — HIGH (ref 10.3–14.5)
RSV RNA NPH QL NAA+NON-PROBE: SIGNIFICANT CHANGE UP
RSV RNA NPH QL NAA+NON-PROBE: SIGNIFICANT CHANGE UP
SAO2 % BLDV: 37.3 % — LOW (ref 94–98)
SARS-COV-2 RNA SPEC QL NAA+PROBE: DETECTED
SARS-COV-2 RNA SPEC QL NAA+PROBE: DETECTED
SODIUM SERPL-SCNC: 142 MMOL/L — SIGNIFICANT CHANGE UP (ref 135–145)
TROPONIN I, HIGH SENSITIVITY RESULT: 57.9 NG/L — HIGH
TROPONIN I, HIGH SENSITIVITY RESULT: 70.7 NG/L — HIGH
WBC # BLD: 9.47 K/UL — SIGNIFICANT CHANGE UP (ref 3.8–10.5)
WBC # FLD AUTO: 9.47 K/UL — SIGNIFICANT CHANGE UP (ref 3.8–10.5)

## 2025-01-16 PROCEDURE — 71045 X-RAY EXAM CHEST 1 VIEW: CPT | Mod: 26

## 2025-01-16 PROCEDURE — 99223 1ST HOSP IP/OBS HIGH 75: CPT

## 2025-01-16 PROCEDURE — 99291 CRITICAL CARE FIRST HOUR: CPT

## 2025-01-16 RX ORDER — MAGNESIUM, ALUMINUM HYDROXIDE 200-225/5
30 SUSPENSION, ORAL (FINAL DOSE FORM) ORAL EVERY 4 HOURS
Refills: 0 | Status: DISCONTINUED | OUTPATIENT
Start: 2025-01-16 | End: 2025-01-19

## 2025-01-16 RX ORDER — DEXAMETHASONE SODIUM PHOSPHATE 4 MG/ML
8 INJECTION, SOLUTION INTRA-ARTICULAR; INTRALESIONAL; INTRAMUSCULAR; INTRAVENOUS; SOFT TISSUE ONCE
Refills: 0 | Status: COMPLETED | OUTPATIENT
Start: 2025-01-16 | End: 2025-01-16

## 2025-01-16 RX ORDER — AMLODIPINE BESYLATE 5 MG
10 TABLET ORAL DAILY
Refills: 0 | Status: DISCONTINUED | OUTPATIENT
Start: 2025-01-17 | End: 2025-01-19

## 2025-01-16 RX ORDER — CARVEDILOL 6.25 MG
25 TABLET ORAL EVERY 12 HOURS
Refills: 0 | Status: DISCONTINUED | OUTPATIENT
Start: 2025-01-16 | End: 2025-01-19

## 2025-01-16 RX ORDER — ASPIRIN 81 MG/1
81 TABLET, COATED ORAL DAILY
Refills: 0 | Status: DISCONTINUED | OUTPATIENT
Start: 2025-01-16 | End: 2025-01-19

## 2025-01-16 RX ORDER — ACETAMINOPHEN 160 MG/5ML
650 SUSPENSION ORAL EVERY 6 HOURS
Refills: 0 | Status: DISCONTINUED | OUTPATIENT
Start: 2025-01-16 | End: 2025-01-19

## 2025-01-16 RX ORDER — SACUBITRIL AND VALSARTAN 49; 51 MG/1; MG/1
1 TABLET, FILM COATED ORAL
Refills: 0 | Status: DISCONTINUED | OUTPATIENT
Start: 2025-01-16 | End: 2025-01-19

## 2025-01-16 RX ORDER — ACETAMINOPHEN, DIPHENHYDRAMINE HCL, PHENYLEPHRINE HCL 325; 25; 5 MG/1; MG/1; MG/1
3 TABLET ORAL AT BEDTIME
Refills: 0 | Status: DISCONTINUED | OUTPATIENT
Start: 2025-01-16 | End: 2025-01-19

## 2025-01-16 RX ADMIN — Medication 25 MILLIGRAM(S): at 23:18

## 2025-01-16 RX ADMIN — ACETAMINOPHEN 650 MILLIGRAM(S): 160 SUSPENSION ORAL at 22:54

## 2025-01-16 RX ADMIN — SACUBITRIL AND VALSARTAN 1 TABLET(S): 49; 51 TABLET, FILM COATED ORAL at 23:18

## 2025-01-16 RX ADMIN — Medication 40 MILLIGRAM(S): at 17:20

## 2025-01-16 RX ADMIN — Medication 40 MILLIGRAM(S): at 20:17

## 2025-01-16 NOTE — ED ADULT NURSE NOTE - CHIEF COMPLAINT QUOTE
BIBA from home for shortness of breath sudden onset within an hour from getting up from the bed, was here two days ago for the same thing, patient sating 88% on 3L home oxygen, was given 3 nitro sprays by emt, emt bgl 136

## 2025-01-16 NOTE — H&P ADULT - ASSESSMENT
Patient is a 49F, with PMHx of HFrEF (EF 45-50%), HTN, CKD, DM T2 presented with 1 day of shortness of breath. Admitted for acute on chronic HFrEF.

## 2025-01-16 NOTE — ED PROVIDER NOTE - CLINICAL SUMMARY MEDICAL DECISION MAKING FREE TEXT BOX
49-year-old female with a past medical history of hypertension, HFrEF, CKD, diabetes presenting with shortness of breath. Patient was discharged from the hospital 2 days ago for CHF exacerbation. Patient woke up at 3 AM with shortness of breath worsening throughout the day. According to EMS patient had blood pressure of 189/109. Patient received 3 doses of sublingual nitroglycerin by EMS. Patient had improvement in symptoms. Patient denies fevers, chest pain, nausea, vomiting, abdominal pain. Physical exam reveals uncomfortable appearing female, short of breath, tachycardic, crackles at bilateral lung bases, soft nontender abdomen, 1+ pitting edema in the bilateral lower extremities. CBC, CMP, BNP, troponin, EKG, chest x-ray, BiPAP. 49-year-old female with a past medical history of hypertension, HFrEF, CKD, diabetes presenting with shortness of breath. Patient was discharged from the hospital 2 days ago for CHF exacerbation. Patient woke up at 3 AM with shortness of breath worsening throughout the day. According to EMS patient had blood pressure of 189/109. Patient received 3 doses of sublingual nitroglycerin by EMS. Patient had improvement in symptoms. Patient denies fevers, chest pain, nausea, vomiting, abdominal pain. Physical exam reveals uncomfortable appearing female, short of breath, tachycardic, crackles at bilateral lung bases, soft nontender abdomen, 1+ pitting edema in the bilateral lower extremities. CBC, CMP, BNP, troponin, EKG, chest x-ray, BiPAP.    Attending note (Bobo): patient endorsed to me at sign-out; likely chf exacerbation; on bipap; now also covid19 positive; improving and made only small amount of urine; will give additional lasix; defer additional treatments to inpatient team; admitted to medicine service, accepted by Dr OLGA De La O.    Attending note (Bobo): patient endorsed to me at sign-out; likely chf exacerbation; on bipap; now also covid19 positive; improving and made only small amount of urine; will give additional lasix, decadron 8mg ivpb; defer additional treatments to inpatient team; admitted to medicine service, accepted by Dr OLGA De La O.

## 2025-01-16 NOTE — ED ADULT TRIAGE NOTE - CHIEF COMPLAINT QUOTE
BIBA from home for shortness of breath sudden onset within an hour from getting up from the bed, was here two days ago for the same thing, patient sating 88% on 3L home oxygen, was given 3 nitro sprays by emt, emt bgl 136
10-Dec-2023

## 2025-01-16 NOTE — H&P ADULT - HISTORY OF PRESENT ILLNESS
Patient is a 49F, with PMHx of HFrEF (EF 45-50%), HTN, CKD, DM T2 presented with 1 day of shortness of breath. She says she is compliant on her Furosemide, but at the same time, She says that she "drinks plenty of water at home". Patient denies headache, fever, chills, nausea, vomit, chest pain, cough, lightheadedness, abdominal pain, diarrhea, constipation, dark/bloody stool, dysuria, hematuria, loss of strength, or loss of sensation. She says that she "drinks plenty of water at home".    Patient was recently admitted 1/10~1/14 for CHF exacerbation. Patient is a 49F, with PMHx of HFrEF (EF 45-50%), HTN, CKD, DM T2 presented with 1 day of shortness of breath. She says she is compliant on her Furosemide, but at the same time, She says that she "drinks plenty of water at home". Patient denies headache, fever, chills, nausea, vomit, chest pain, cough, lightheadedness, abdominal pain, diarrhea, constipation, dark/bloody stool, hematuria, loss of strength, or loss of sensation. She says that she "drinks plenty of water at home".    Patient was recently admitted 1/10~1/14 for CHF exacerbation.

## 2025-01-16 NOTE — ED ADULT NURSE NOTE - OBJECTIVE STATEMENT
50 yo female, A&Ox4 BIBA from home for shortness of breath sudden onset within an hour from getting up from the bed, was admitted to this hospital two days ago with same symptoms, patient sating 88% on 3L home oxygen, was given 3 nitro sprays by emt. PMH: CHF, PUD, HTN, DMT2

## 2025-01-16 NOTE — ED PROVIDER NOTE - CRITICAL CARE ATTENDING CONTRIBUTION TO CARE
Attending note (Bobo): see MDM above; chf exacerbation and covid 19 positive; respiratory failure requiring bipap; frequent reassessments.

## 2025-01-16 NOTE — ED PROVIDER NOTE - CARE PLAN
1 Principal Discharge DX:	CHF exacerbation  Secondary Diagnosis:	2019 novel coronavirus disease (COVID-19)

## 2025-01-16 NOTE — ED PROVIDER NOTE - PROGRESS NOTE DETAILS
Attending note (Bobo): patient endorsed to me at sign-out; likely chf exacerbation; on bipap; now also covid19 positive; improving and made only small amount of urine; will give additional lasix, decadron 8mg ivpb; defer additional treatments to inpatient team; admitted to medicine service, accepted by Dr OLGA De La O.

## 2025-01-16 NOTE — H&P ADULT - PROBLEM SELECTOR PLAN 1
- P/w shortness of breath and dyspnea on exertion  - CXR = Mild CHF somewhat increased prior  - Pro-BNP = 8736  - Trop 57.9  - S/p IV Lasix 40mg x2 in the ED  - Initially required Bipap -> Now on room air  - Start IV Lasix 40mg BID  - C/w home Entresto, carvedilol  - Admit to telemetry observation  - Fluid restriction 1.2L/day. Pt educated on importance of fluid restriction  - Strict I&O, daily weight

## 2025-01-16 NOTE — H&P ADULT - NSHPPHYSICALEXAM_GEN_ALL_CORE
GENERAL: NAD  HEAD: Atraumatic, Normocephalic  EYES: EOMI. Conjunctiva and sclera clear  NECK: Supple  CHEST/LUNG: Mild expiratory crackles bilaterally. No wheeze, rhonchi  HEART: Regular rate and rhythm; No murmurs  ABDOMEN: Soft, Nontender, Nondistended; Bowel sounds present  EXTREMITIES: Trace BL LE edema  NEURO: AAOx3

## 2025-01-16 NOTE — ED ADULT NURSE NOTE - NSFALLHARMRISKINTERV_ED_ALL_ED

## 2025-01-16 NOTE — ED PROVIDER NOTE - PHYSICAL EXAMINATION
General: Appears uncomfortable, SOB  Mentation: A&O x 3  psych: mood appropriate  HEENT: airway patent, conjunctivae clear bilaterally  Resp: SOB, crackles at bilateral lung bases  Cardio: tachycardic  GI: soft/nondistended/nontender  : no CVA tenderness  Neuro: sensation and motor function grossly intact  Skin: no cyanosis, no jaundice  MSK: normal movement of all extremities  Lymph/Vasc: 1+ CARLY pitting edema

## 2025-01-16 NOTE — H&P ADULT - PROBLEM SELECTOR PLAN 2
- Hold home PO meds  - ISS + FS achs - COVID positive  - Patient asymptomatic. Demands re-test  - F/u repeat COVID swab - COVID positive  - Patient asymptomatic.  - Supportive care  - Isolation

## 2025-01-17 LAB
ALBUMIN SERPL ELPH-MCNC: 3.1 G/DL — LOW (ref 3.3–5)
ALP SERPL-CCNC: 105 U/L — SIGNIFICANT CHANGE UP (ref 40–120)
ALT FLD-CCNC: 20 U/L — SIGNIFICANT CHANGE UP (ref 12–78)
ANION GAP SERPL CALC-SCNC: 6 MMOL/L — SIGNIFICANT CHANGE UP (ref 5–17)
APPEARANCE UR: CLEAR — SIGNIFICANT CHANGE UP
AST SERPL-CCNC: 11 U/L — LOW (ref 15–37)
BACTERIA # UR AUTO: ABNORMAL /HPF
BILIRUB SERPL-MCNC: 0.7 MG/DL — SIGNIFICANT CHANGE UP (ref 0.2–1.2)
BILIRUB UR-MCNC: NEGATIVE — SIGNIFICANT CHANGE UP
BUN SERPL-MCNC: 32 MG/DL — HIGH (ref 7–23)
CALCIUM SERPL-MCNC: 8.6 MG/DL — SIGNIFICANT CHANGE UP (ref 8.5–10.1)
CHLORIDE SERPL-SCNC: 107 MMOL/L — SIGNIFICANT CHANGE UP (ref 96–108)
CO2 SERPL-SCNC: 28 MMOL/L — SIGNIFICANT CHANGE UP (ref 22–31)
COD CRY URNS QL: PRESENT
COLOR SPEC: YELLOW — SIGNIFICANT CHANGE UP
COMMENT - URINE 2: SIGNIFICANT CHANGE UP
CREAT SERPL-MCNC: 2.22 MG/DL — HIGH (ref 0.5–1.3)
D DIMER BLD IA.RAPID-MCNC: 1235 NG/ML DDU — HIGH
DIFF PNL FLD: NEGATIVE — SIGNIFICANT CHANGE UP
EGFR: 27 ML/MIN/1.73M2 — LOW
EPI CELLS # UR: PRESENT
GLUCOSE SERPL-MCNC: 141 MG/DL — HIGH (ref 70–99)
GLUCOSE UR QL: 500 MG/DL
HCT VFR BLD CALC: 29.8 % — LOW (ref 34.5–45)
HGB BLD-MCNC: 9.9 G/DL — LOW (ref 11.5–15.5)
KETONES UR-MCNC: NEGATIVE MG/DL — SIGNIFICANT CHANGE UP
LEUKOCYTE ESTERASE UR-ACNC: NEGATIVE — SIGNIFICANT CHANGE UP
MCHC RBC-ENTMCNC: 25.3 PG — LOW (ref 27–34)
MCHC RBC-ENTMCNC: 33.2 G/DL — SIGNIFICANT CHANGE UP (ref 32–36)
MCV RBC AUTO: 76 FL — LOW (ref 80–100)
NITRITE UR-MCNC: POSITIVE
NRBC # BLD: 0 /100 WBCS — SIGNIFICANT CHANGE UP (ref 0–0)
NRBC BLD-RTO: 0 /100 WBCS — SIGNIFICANT CHANGE UP (ref 0–0)
PH UR: 6.5 — SIGNIFICANT CHANGE UP (ref 5–8)
PLATELET # BLD AUTO: 171 K/UL — SIGNIFICANT CHANGE UP (ref 150–400)
POTASSIUM SERPL-MCNC: 3.5 MMOL/L — SIGNIFICANT CHANGE UP (ref 3.5–5.3)
POTASSIUM SERPL-SCNC: 3.5 MMOL/L — SIGNIFICANT CHANGE UP (ref 3.5–5.3)
PROT SERPL-MCNC: 7.4 GM/DL — SIGNIFICANT CHANGE UP (ref 6–8.3)
PROT UR-MCNC: 30 MG/DL
RBC # BLD: 3.92 M/UL — SIGNIFICANT CHANGE UP (ref 3.8–5.2)
RBC # FLD: 15.9 % — HIGH (ref 10.3–14.5)
RBC CASTS # UR COMP ASSIST: 2 /HPF — SIGNIFICANT CHANGE UP (ref 0–4)
SODIUM SERPL-SCNC: 141 MMOL/L — SIGNIFICANT CHANGE UP (ref 135–145)
SP GR SPEC: 1.02 — SIGNIFICANT CHANGE UP (ref 1–1.03)
TROPONIN I, HIGH SENSITIVITY RESULT: 71.7 NG/L — HIGH
TROPONIN I, HIGH SENSITIVITY RESULT: 78.3 NG/L — HIGH
UROBILINOGEN FLD QL: 1 MG/DL — SIGNIFICANT CHANGE UP (ref 0.2–1)
WBC # BLD: 6.86 K/UL — SIGNIFICANT CHANGE UP (ref 3.8–10.5)
WBC # FLD AUTO: 6.86 K/UL — SIGNIFICANT CHANGE UP (ref 3.8–10.5)
WBC UR QL: 2 /HPF — SIGNIFICANT CHANGE UP (ref 0–5)

## 2025-01-17 PROCEDURE — 99232 SBSQ HOSP IP/OBS MODERATE 35: CPT

## 2025-01-17 RX ORDER — FLUTICASONE PROPIONATE 50 UG/1
1 SPRAY, METERED NASAL
Refills: 0 | Status: DISCONTINUED | OUTPATIENT
Start: 2025-01-17 | End: 2025-01-19

## 2025-01-17 RX ORDER — HEPARIN SODIUM,PORCINE 10000/ML
5000 VIAL (ML) INJECTION EVERY 12 HOURS
Refills: 0 | Status: DISCONTINUED | OUTPATIENT
Start: 2025-01-17 | End: 2025-01-19

## 2025-01-17 RX ORDER — IPRATROPIUM BROMIDE AND ALBUTEROL SULFATE .5; 2.5 MG/3ML; MG/3ML
3 SOLUTION RESPIRATORY (INHALATION) EVERY 6 HOURS
Refills: 0 | Status: DISCONTINUED | OUTPATIENT
Start: 2025-01-17 | End: 2025-01-19

## 2025-01-17 RX ADMIN — SACUBITRIL AND VALSARTAN 1 TABLET(S): 49; 51 TABLET, FILM COATED ORAL at 05:41

## 2025-01-17 RX ADMIN — SACUBITRIL AND VALSARTAN 1 TABLET(S): 49; 51 TABLET, FILM COATED ORAL at 17:57

## 2025-01-17 RX ADMIN — Medication 25 MILLIGRAM(S): at 18:17

## 2025-01-17 RX ADMIN — Medication 10 MILLIGRAM(S): at 05:40

## 2025-01-17 RX ADMIN — FLUTICASONE PROPIONATE 1 SPRAY(S): 50 SPRAY, METERED NASAL at 17:56

## 2025-01-17 RX ADMIN — ASPIRIN 81 MILLIGRAM(S): 81 TABLET, COATED ORAL at 17:56

## 2025-01-17 RX ADMIN — IPRATROPIUM BROMIDE AND ALBUTEROL SULFATE 3 MILLILITER(S): .5; 2.5 SOLUTION RESPIRATORY (INHALATION) at 18:18

## 2025-01-17 RX ADMIN — Medication 25 MILLIGRAM(S): at 05:41

## 2025-01-17 RX ADMIN — IPRATROPIUM BROMIDE AND ALBUTEROL SULFATE 3 MILLILITER(S): .5; 2.5 SOLUTION RESPIRATORY (INHALATION) at 23:19

## 2025-01-17 RX ADMIN — Medication 40 MILLIGRAM(S): at 05:41

## 2025-01-17 RX ADMIN — Medication 40 MILLIGRAM(S): at 17:57

## 2025-01-17 RX ADMIN — ACETAMINOPHEN 650 MILLIGRAM(S): 160 SUSPENSION ORAL at 17:56

## 2025-01-17 NOTE — PROGRESS NOTE ADULT - SUBJECTIVE AND OBJECTIVE BOX
Patient is a 49y old  Female who presents with a chief complaint of Acute on chronic HFrEF (2025 20:22)      OVERNIGHT EVENTS:    MEDICATIONS  (STANDING):  amLODIPine   Tablet 10 milliGRAM(s) Oral daily  aspirin enteric coated 81 milliGRAM(s) Oral daily  carvedilol 25 milliGRAM(s) Oral every 12 hours  furosemide   Injectable 40 milliGRAM(s) IV Push <User Schedule>  heparin   Injectable 5000 Unit(s) SubCutaneous every 12 hours  sacubitril 97 mG/valsartan 103 mG 1 Tablet(s) Oral two times a day    MEDICATIONS  (PRN):  acetaminophen     Tablet .. 650 milliGRAM(s) Oral every 6 hours PRN Temp greater or equal to 38C (100.4F), Mild Pain (1 - 3)  aluminum hydroxide/magnesium hydroxide/simethicone Suspension 30 milliLiter(s) Oral every 4 hours PRN Dyspepsia  melatonin 3 milliGRAM(s) Oral at bedtime PRN Insomnia      Allergies    lisinopril (Other)    Intolerances        SUBJECTIVE: in bed in NAD, no acute events overnight     T(F): 98.8 (25 @ 09:24), Max: 100.5 (25 @ 23:18)  HR: 99 (25 @ 09:24) (97 - 112)  BP: 115/76 (25 @ 09:24) (115/76 - 168/95)  RR: 19 (25 @ 09:24) (18 - 23)  SpO2: 99% (25 @ 09:24) (90% - 100%)  Wt(kg): --    PHYSICAL EXAM:  GENERAL: NAD, well-groomed, well-developed  HEAD:  Atraumatic, Normocephalic  EYES: EOMI, PERRLA, conjunctiva and sclera clear  ENMT: No tonsillar erythema, exudates, or enlargement; Moist mucous membranes, Good dentition, No lesions  NECK: Supple,  CHEST/LUNG: Clear to  auscultation bilaterally; No rales, rhonchi, wheezing, or rubs  bilaterally  HEART: Regular rate and rhythm; No murmurs, rubs, or gallops  ABDOMEN: Soft, Nontender, Nondistended; Bowel sounds present  EXTREMITIES:  2+ Peripheral Pulses, No clubbing, cyanosis, or edema BL LE  SKIN: No rashes or lesions  NERVOUS SYSTEM:  Alert & Oriented X3, Good concentration; Motor Strength 5/5 B/L upper and lower extremities;   DTRs 2+ intact and symmetric, sensation intact BL    LABS:                        9.9    6.86  )-----------( 171      ( 2025 08:15 )             29.8         141  |  107  |  32[H]  ----------------------------<  141[H]  3.5   |  28  |  2.22[H]    Ca    8.6      2025 08:15    TPro  7.4  /  Alb  3.1[L]  /  TBili  0.7  /  DBili  x   /  AST  11[L]  /  ALT  20  /  AlkPhos  105    Pro-Brain Natriuretic Peptide (25 @ 16:45)    Pro-Brain Natriuretic Peptide: 8736 pg/mL          Urinalysis Basic - ( 2025 09:30 )    Color: Yellow / Appearance: Clear / S.018 / pH: x  Gluc: x / Ketone: Negative mg/dL  / Bili: Negative / Urobili: 1.0 mg/dL   Blood: x / Protein: 30 mg/dL / Nitrite: Positive   Leuk Esterase: Negative / RBC: 2 /HPF / WBC 2 /HPF   Sq Epi: x / Non Sq Epi: x / Bacteria: Moderate /HPF      Cultures;   CAPILLARY BLOOD GLUCOSE        Lipid panel:           RADIOLOGY & ADDITIONAL TESTS:  < from: Xray Chest 1 View- PORTABLE-Urgent (25 @ 17:07) >    IMPRESSION: Mild CHF somewhat increased prior.      < end of copied text >  < from: TTE Echo Complete w/o Contrast w/ Doppler (24 @ 10:17) >  ___________________________________________________________________________  ____________    CONCLUSIONS:     1. Left ventricular cavity is mildly dilated. Left ventricular systolic   function is mildly decreased with an ejection fraction visually estimated   at 45 to 50 %.   2. Severe left ventricular hypertrophy.   3. There is severe (grade 3) left ventricular diastolic dysfunction.   4. Normal right ventricular cavity size and mildly increased wall   thickness,.   5. Left atrium is severely dilated.   6. Mildmitral valve leaflet calcification.   7. Mild mitral valve stenosis.   8. Mild to moderate mitral regurgitation.   9. Moderate tricuspid regurgitation.  10. Moderate aortic regurgitation.  11. Moderate pulmonic regurgitation.  12. Small pericardial effusion with no evidence of hemodynamic compromise   (or echocardiographic evidence of cardiac tamponade). There is some RA   collapse, but this lacks specificity and not diagnostic in itself.  13. Estimated pulmonary artery systolic pressure is 44 mmHg.    < end of copied text >  < from: TTE Echo Complete w/o Contrast w/ Doppler (24 @ 10:17) >  ___________________________________________________________________________  ____________    Imaging Personally Reviewed:  [ x] YES      Consultant(s) Notes Reviewed:  [x ] YES     Care Discussed with [x ] Consultants [X ] Patient [x ] Family  [x ]    [x ]  Other; RN Patient is a 49y old  Female who presents with a chief complaint of Acute on chronic HFrEF (2025 20:22)      OVERNIGHT EVENTS:    MEDICATIONS  (STANDING):  amLODIPine   Tablet 10 milliGRAM(s) Oral daily  aspirin enteric coated 81 milliGRAM(s) Oral daily  carvedilol 25 milliGRAM(s) Oral every 12 hours  furosemide   Injectable 40 milliGRAM(s) IV Push <User Schedule>  heparin   Injectable 5000 Unit(s) SubCutaneous every 12 hours  sacubitril 97 mG/valsartan 103 mG 1 Tablet(s) Oral two times a day    MEDICATIONS  (PRN):  acetaminophen     Tablet .. 650 milliGRAM(s) Oral every 6 hours PRN Temp greater or equal to 38C (100.4F), Mild Pain (1 - 3)  aluminum hydroxide/magnesium hydroxide/simethicone Suspension 30 milliLiter(s) Oral every 4 hours PRN Dyspepsia  melatonin 3 milliGRAM(s) Oral at bedtime PRN Insomnia      Allergies    lisinopril (Other)    Intolerances        SUBJECTIVE: in bed in NAD, no acute events overnight     T(F): 98.8 (25 @ 09:24), Max: 100.5 (25 @ 23:18)  HR: 99 (25 @ 09:24) (97 - 112)  BP: 115/76 (25 @ 09:24) (115/76 - 168/95)  RR: 19 (25 @ 09:24) (18 - 23)  SpO2: 99% (25 @ 09:24) (90% - 100%)  Wt(kg): --    PHYSICAL EXAM:  GENERAL: NAD, well-groomed, well-developed  HEAD:  Atraumatic, Normocephalic  EYES: EOMI, PERRLA, conjunctiva and sclera clear  ENMT: No tonsillar erythema, exudates, or enlargement; Moist mucous membranes, Good dentition, No lesions  NECK: Supple,  CHEST/LUNG: coarse bs diffusely   HEART: Regular rate and rhythm; No murmurs, rubs, or gallops  ABDOMEN: Soft, Nontender, Nondistended; Bowel sounds present  EXTREMITIES:  2+ Peripheral Pulses, No clubbing, cyanosis,+ edema BL LE  SKIN: No rashes or lesions  NERVOUS SYSTEM:  Alert & Oriented X3, Good concentration; Motor Strength 5/5 B/L upper and lower extremities;   DTRs 2+ intact and symmetric, sensation intact BL    LABS:                        9.9    6.86  )-----------( 171      ( 2025 08:15 )             29.8         141  |  107  |  32[H]  ----------------------------<  141[H]  3.5   |  28  |  2.22[H]    Ca    8.6      2025 08:15    TPro  7.4  /  Alb  3.1[L]  /  TBili  0.7  /  DBili  x   /  AST  11[L]  /  ALT  20  /  AlkPhos  105    Pro-Brain Natriuretic Peptide (25 @ 16:45)    Pro-Brain Natriuretic Peptide: 8736 pg/mL          Urinalysis Basic - ( 2025 09:30 )    Color: Yellow / Appearance: Clear / S.018 / pH: x  Gluc: x / Ketone: Negative mg/dL  / Bili: Negative / Urobili: 1.0 mg/dL   Blood: x / Protein: 30 mg/dL / Nitrite: Positive   Leuk Esterase: Negative / RBC: 2 /HPF / WBC 2 /HPF   Sq Epi: x / Non Sq Epi: x / Bacteria: Moderate /HPF      Cultures;   CAPILLARY BLOOD GLUCOSE        Lipid panel:           RADIOLOGY & ADDITIONAL TESTS:  < from: Xray Chest 1 View- PORTABLE-Urgent (25 @ 17:07) >    IMPRESSION: Mild CHF somewhat increased prior.      < end of copied text >  < from: TTE Echo Complete w/o Contrast w/ Doppler (24 @ 10:17) >  ___________________________________________________________________________  ____________    CONCLUSIONS:     1. Left ventricular cavity is mildly dilated. Left ventricular systolic   function is mildly decreased with an ejection fraction visually estimated   at 45 to 50 %.   2. Severe left ventricular hypertrophy.   3. There is severe (grade 3) left ventricular diastolic dysfunction.   4. Normal right ventricular cavity size and mildly increased wall   thickness,.   5. Left atrium is severely dilated.   6. Mildmitral valve leaflet calcification.   7. Mild mitral valve stenosis.   8. Mild to moderate mitral regurgitation.   9. Moderate tricuspid regurgitation.  10. Moderate aortic regurgitation.  11. Moderate pulmonic regurgitation.  12. Small pericardial effusion with no evidence of hemodynamic compromise   (or echocardiographic evidence of cardiac tamponade). There is some RA   collapse, but this lacks specificity and not diagnostic in itself.  13. Estimated pulmonary artery systolic pressure is 44 mmHg.    < end of copied text >  < from: TTE Echo Complete w/o Contrast w/ Doppler (24 @ 10:17) >  ___________________________________________________________________________  ____________    Imaging Personally Reviewed:  [ x] YES      Consultant(s) Notes Reviewed:  [x ] YES     Care Discussed with [x ] Consultants [X ] Patient [x ] Family  [x ]    [x ]  Other; RN

## 2025-01-17 NOTE — PROGRESS NOTE ADULT - PROBLEM SELECTOR PLAN 1
- per my colleague's documentation "P/w shortness of breath and dyspnea on exertion  - CXR = Mild CHF somewhat increased prior  - Pro-BNP = 8736  - Trop 57.9  - S/p IV Lasix 40mg x2 in the ED  - Initially required Bipap -> Now on room air  - Start IV Lasix 40mg BID  - C/w home Entresto, carvedilol  - Admit to telemetry observation  - Fluid restriction 1.2L/day. 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

## 2025-01-18 LAB
ALBUMIN SERPL ELPH-MCNC: 2.9 G/DL — LOW (ref 3.3–5)
ALBUMIN SERPL ELPH-MCNC: 3.1 G/DL — LOW (ref 3.3–5)
ALP SERPL-CCNC: 103 U/L — SIGNIFICANT CHANGE UP (ref 40–120)
ALP SERPL-CCNC: 96 U/L — SIGNIFICANT CHANGE UP (ref 40–120)
ALT FLD-CCNC: 19 U/L — SIGNIFICANT CHANGE UP (ref 12–78)
ALT FLD-CCNC: 20 U/L — SIGNIFICANT CHANGE UP (ref 12–78)
ANION GAP SERPL CALC-SCNC: 5 MMOL/L — SIGNIFICANT CHANGE UP (ref 5–17)
ANION GAP SERPL CALC-SCNC: 8 MMOL/L — SIGNIFICANT CHANGE UP (ref 5–17)
AST SERPL-CCNC: 18 U/L — SIGNIFICANT CHANGE UP (ref 15–37)
AST SERPL-CCNC: 20 U/L — SIGNIFICANT CHANGE UP (ref 15–37)
BILIRUB DIRECT SERPL-MCNC: 0.1 MG/DL — SIGNIFICANT CHANGE UP (ref 0–0.3)
BILIRUB INDIRECT FLD-MCNC: 0.3 MG/DL — SIGNIFICANT CHANGE UP (ref 0.2–1)
BILIRUB SERPL-MCNC: 0.4 MG/DL — SIGNIFICANT CHANGE UP (ref 0.2–1.2)
BILIRUB SERPL-MCNC: 0.5 MG/DL — SIGNIFICANT CHANGE UP (ref 0.2–1.2)
BUN SERPL-MCNC: 28 MG/DL — HIGH (ref 7–23)
BUN SERPL-MCNC: 34 MG/DL — HIGH (ref 7–23)
CALCIUM SERPL-MCNC: 8.1 MG/DL — LOW (ref 8.5–10.1)
CALCIUM SERPL-MCNC: 8.2 MG/DL — LOW (ref 8.5–10.1)
CHLORIDE SERPL-SCNC: 107 MMOL/L — SIGNIFICANT CHANGE UP (ref 96–108)
CHLORIDE SERPL-SCNC: 108 MMOL/L — SIGNIFICANT CHANGE UP (ref 96–108)
CO2 SERPL-SCNC: 26 MMOL/L — SIGNIFICANT CHANGE UP (ref 22–31)
CO2 SERPL-SCNC: 28 MMOL/L — SIGNIFICANT CHANGE UP (ref 22–31)
CREAT SERPL-MCNC: 2.14 MG/DL — HIGH (ref 0.5–1.3)
CREAT SERPL-MCNC: 2.23 MG/DL — HIGH (ref 0.5–1.3)
CREAT SERPL-MCNC: 2.27 MG/DL — HIGH (ref 0.5–1.3)
EGFR: 26 ML/MIN/1.73M2 — LOW
EGFR: 26 ML/MIN/1.73M2 — LOW
EGFR: 28 ML/MIN/1.73M2 — LOW
GLUCOSE BLDC GLUCOMTR-MCNC: 126 MG/DL — HIGH (ref 70–99)
GLUCOSE SERPL-MCNC: 109 MG/DL — HIGH (ref 70–99)
GLUCOSE SERPL-MCNC: 153 MG/DL — HIGH (ref 70–99)
HCT VFR BLD CALC: 29.6 % — LOW (ref 34.5–45)
HGB BLD-MCNC: 10 G/DL — LOW (ref 11.5–15.5)
INR BLD: 1.12 RATIO — SIGNIFICANT CHANGE UP (ref 0.85–1.16)
MAGNESIUM SERPL-MCNC: 2.2 MG/DL — SIGNIFICANT CHANGE UP (ref 1.6–2.6)
MCHC RBC-ENTMCNC: 25.4 PG — LOW (ref 27–34)
MCHC RBC-ENTMCNC: 33.8 G/DL — SIGNIFICANT CHANGE UP (ref 32–36)
MCV RBC AUTO: 75.3 FL — LOW (ref 80–100)
NRBC # BLD: 0 /100 WBCS — SIGNIFICANT CHANGE UP (ref 0–0)
NRBC BLD-RTO: 0 /100 WBCS — SIGNIFICANT CHANGE UP (ref 0–0)
PHOSPHATE SERPL-MCNC: 3.9 MG/DL — SIGNIFICANT CHANGE UP (ref 2.5–4.5)
PLATELET # BLD AUTO: 159 K/UL — SIGNIFICANT CHANGE UP (ref 150–400)
POTASSIUM SERPL-MCNC: 3.3 MMOL/L — LOW (ref 3.5–5.3)
POTASSIUM SERPL-MCNC: 3.9 MMOL/L — SIGNIFICANT CHANGE UP (ref 3.5–5.3)
POTASSIUM SERPL-SCNC: 3.3 MMOL/L — LOW (ref 3.5–5.3)
POTASSIUM SERPL-SCNC: 3.9 MMOL/L — SIGNIFICANT CHANGE UP (ref 3.5–5.3)
PROT SERPL-MCNC: 7 GM/DL — SIGNIFICANT CHANGE UP (ref 6–8.3)
PROT SERPL-MCNC: 7.2 GM/DL — SIGNIFICANT CHANGE UP (ref 6–8.3)
PROTHROM AB SERPL-ACNC: 12.6 SEC — SIGNIFICANT CHANGE UP (ref 9.9–13.4)
RBC # BLD: 3.93 M/UL — SIGNIFICANT CHANGE UP (ref 3.8–5.2)
RBC # FLD: 15.5 % — HIGH (ref 10.3–14.5)
SODIUM SERPL-SCNC: 141 MMOL/L — SIGNIFICANT CHANGE UP (ref 135–145)
SODIUM SERPL-SCNC: 141 MMOL/L — SIGNIFICANT CHANGE UP (ref 135–145)
WBC # BLD: 5.63 K/UL — SIGNIFICANT CHANGE UP (ref 3.8–10.5)
WBC # FLD AUTO: 5.63 K/UL — SIGNIFICANT CHANGE UP (ref 3.8–10.5)

## 2025-01-18 PROCEDURE — 99231 SBSQ HOSP IP/OBS SF/LOW 25: CPT

## 2025-01-18 RX ORDER — GLUCAGON 3 MG/1
1 POWDER NASAL ONCE
Refills: 0 | Status: DISCONTINUED | OUTPATIENT
Start: 2025-01-18 | End: 2025-01-19

## 2025-01-18 RX ORDER — INSULIN LISPRO 100/ML
VIAL (ML) SUBCUTANEOUS
Refills: 0 | Status: DISCONTINUED | OUTPATIENT
Start: 2025-01-18 | End: 2025-01-19

## 2025-01-18 RX ORDER — DEXAMETHASONE SODIUM PHOSPHATE 4 MG/ML
6 INJECTION, SOLUTION INTRA-ARTICULAR; INTRALESIONAL; INTRAMUSCULAR; INTRAVENOUS; SOFT TISSUE DAILY
Refills: 0 | Status: DISCONTINUED | OUTPATIENT
Start: 2025-01-18 | End: 2025-01-19

## 2025-01-18 RX ORDER — POTASSIUM CHLORIDE 750 MG/1
40 TABLET, EXTENDED RELEASE ORAL ONCE
Refills: 0 | Status: DISCONTINUED | OUTPATIENT
Start: 2025-01-18 | End: 2025-01-18

## 2025-01-18 RX ORDER — POTASSIUM CHLORIDE 750 MG/1
20 TABLET, EXTENDED RELEASE ORAL ONCE
Refills: 0 | Status: COMPLETED | OUTPATIENT
Start: 2025-01-18 | End: 2025-01-18

## 2025-01-18 RX ORDER — SODIUM CHLORIDE 9 G/ML
1000 INJECTION, SOLUTION INTRAVENOUS
Refills: 0 | Status: DISCONTINUED | OUTPATIENT
Start: 2025-01-18 | End: 2025-01-19

## 2025-01-18 RX ORDER — DM/PSEUDOEPHED/ACETAMINOPHEN 10-30-250
12.5 CAPSULE ORAL ONCE
Refills: 0 | Status: DISCONTINUED | OUTPATIENT
Start: 2025-01-18 | End: 2025-01-19

## 2025-01-18 RX ORDER — DM/PSEUDOEPHED/ACETAMINOPHEN 10-30-250
25 CAPSULE ORAL ONCE
Refills: 0 | Status: DISCONTINUED | OUTPATIENT
Start: 2025-01-18 | End: 2025-01-19

## 2025-01-18 RX ORDER — REMDESIVIR 100 MG/1
INJECTION, POWDER, LYOPHILIZED, FOR SOLUTION INTRAVENOUS
Refills: 0 | Status: DISCONTINUED | OUTPATIENT
Start: 2025-01-18 | End: 2025-01-18

## 2025-01-18 RX ORDER — DM/PSEUDOEPHED/ACETAMINOPHEN 10-30-250
15 CAPSULE ORAL ONCE
Refills: 0 | Status: DISCONTINUED | OUTPATIENT
Start: 2025-01-18 | End: 2025-01-19

## 2025-01-18 RX ADMIN — IPRATROPIUM BROMIDE AND ALBUTEROL SULFATE 3 MILLILITER(S): .5; 2.5 SOLUTION RESPIRATORY (INHALATION) at 11:14

## 2025-01-18 RX ADMIN — POTASSIUM CHLORIDE 20 MILLIEQUIVALENT(S): 750 TABLET, EXTENDED RELEASE ORAL at 10:04

## 2025-01-18 RX ADMIN — Medication 25 MILLIGRAM(S): at 17:11

## 2025-01-18 RX ADMIN — SACUBITRIL AND VALSARTAN 1 TABLET(S): 49; 51 TABLET, FILM COATED ORAL at 17:16

## 2025-01-18 RX ADMIN — IPRATROPIUM BROMIDE AND ALBUTEROL SULFATE 3 MILLILITER(S): .5; 2.5 SOLUTION RESPIRATORY (INHALATION) at 17:28

## 2025-01-18 RX ADMIN — FLUTICASONE PROPIONATE 1 SPRAY(S): 50 SPRAY, METERED NASAL at 05:45

## 2025-01-18 RX ADMIN — Medication 25 MILLIGRAM(S): at 05:39

## 2025-01-18 RX ADMIN — ASPIRIN 81 MILLIGRAM(S): 81 TABLET, COATED ORAL at 11:33

## 2025-01-18 RX ADMIN — Medication 200 MILLIGRAM(S): at 23:44

## 2025-01-18 RX ADMIN — Medication 40 MILLIGRAM(S): at 13:35

## 2025-01-18 RX ADMIN — Medication 200 MILLIGRAM(S): at 17:10

## 2025-01-18 RX ADMIN — IPRATROPIUM BROMIDE AND ALBUTEROL SULFATE 3 MILLILITER(S): .5; 2.5 SOLUTION RESPIRATORY (INHALATION) at 05:30

## 2025-01-18 RX ADMIN — Medication 40 MILLIGRAM(S): at 05:45

## 2025-01-18 RX ADMIN — DEXAMETHASONE SODIUM PHOSPHATE 6 MILLIGRAM(S): 4 INJECTION, SOLUTION INTRA-ARTICULAR; INTRALESIONAL; INTRAMUSCULAR; INTRAVENOUS; SOFT TISSUE at 17:17

## 2025-01-18 RX ADMIN — Medication 5000 UNIT(S): at 05:39

## 2025-01-18 RX ADMIN — Medication 10 MILLIGRAM(S): at 05:39

## 2025-01-18 RX ADMIN — SACUBITRIL AND VALSARTAN 1 TABLET(S): 49; 51 TABLET, FILM COATED ORAL at 05:39

## 2025-01-18 RX ADMIN — FLUTICASONE PROPIONATE 1 SPRAY(S): 50 SPRAY, METERED NASAL at 17:13

## 2025-01-18 NOTE — PROGRESS NOTE ADULT - PROBLEM SELECTOR PLAN 1
- per my colleague's documentation "P/w shortness of breath and dyspnea on exertion  - CXR = Mild CHF somewhat increased prior  - Pro-BNP = 8736  - Trop 57.9  - S/p IV Lasix 40mg x2 in the ED  - Initially required Bipap -> Now on room air  - Start IV Lasix 40mg BID  - C/w home Entresto, carvedilol  - Admit to telemetry observation  - Fluid restriction 1.2L/day. 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

## 2025-01-18 NOTE — PATIENT PROFILE ADULT - FALL HARM RISK - HARM RISK INTERVENTIONS

## 2025-01-18 NOTE — PATIENT PROFILE ADULT - NSPROMEDSBROUGHTTOHOSP_GEN_A_NUR
LVM advised Pt to call [facility] in [place].   Patient Education        sulfamethoxazole and trimethoprim (oral/injection)  Pronunciation:  SUL fa meth OX a zole and trye METH oh prim  Brand:  Bactrim, Bactrim DS, Bactrim I.V., Septra I.V., SMZ-TMP DS, Sulfatrim Pediatric  What is the most important information I should know about sulfamethoxazole and trimethoprim? You should not use this medicine if you have severe liver disease, kidney disease that is not being monitored, anemia caused by folic acid deficiency, if you take dofetilide, or if you have had low platelets caused by using trimethoprim or a sulfa drug. You should not take sulfamethoxazole and trimethoprim if you are pregnant or breastfeeding. What is sulfamethoxazole and trimethoprim? Sulfamethoxazole and trimethoprim is a combination antibiotic used to treat ear infections, urinary tract infections, bronchitis, traveler's diarrhea, shigellosis, and Pneumocystis jiroveci pneumonia. Sulfamethoxazole and trimethoprim may also be used for purposes not listed in this medication guide. What should I discuss with my healthcare provider before using sulfamethoxazole and trimethoprim? You should not use this medicine if you are allergic to sulfamethoxazole or trimethoprim, or if you have:  · severe liver disease;  · kidney disease that is not being treated or monitored;  · anemia (low red blood cells) caused by folic acid deficiency;  · a history of low blood platelets after taking trimethoprim or any sulfa drug; or  · if you take dofetilide (Tikosyn). Do not use if you are pregnant. Use effective birth control, and tell your doctor if you become pregnant. Do not breastfeed while using this medicine. This medicine should not be given to a child younger than 3 months old.    Tell your doctor if you have ever had:  · kidney or liver disease;  · a folate (folic acid) deficiency;  · asthma or severe allergies;  · a thyroid disorder;  · HIV or AIDS;  · malnourishment;  · alcoholism; · high levels of potassium in your blood;  · porphyria, or glucose-6-phosphate dehydrogenase (G6PD) deficiency; or  · if you use a blood thinner (such as warfarin) and you have routine \"INR\" or prothrombin time tests. How should I use sulfamethoxazole and trimethoprim? This medicine is taken by mouth (oral) or given as an infusion into a vein (injection). Follow all directions on your prescription label and read all medication guides or instruction sheets. Use the medicine exactly as directed. Shake the oral suspension (liquid) before you measure a dose. Use the dosing syringe provided, or use a medicine dose-measuring device (not a kitchen spoon). A healthcare provider will give the first injection and may teach you how to properly use the medication by yourself. When using injections by yourself, be sure you understand how to properly mix and store the medicine. Ask your doctor or pharmacist if you don't understand all instructions. Drink plenty of fluids to prevent kidney stones while you are using this medicine. Sulfamethoxazole and trimethoprim doses are based on weight in children. Use only the recommended dose when giving this medicine to a child. Use this medicine for the full prescribed length of time, even if your symptoms quickly improve. Skipping doses can increase your risk of infection that is resistant to medication. This medicine will not treat a viral infection such as the flu or a common cold. You may need frequent medical tests. This medicine can affect the results of certain medical tests. Tell any doctor who treats you that you are using sulfamethoxazole and trimethoprim. Store at room temperature away from moisture, heat, and light. What happens if I miss a dose? Use the medicine as soon as you can, but skip the missed dose if it is almost time for your next dose. Do not use two doses at one time. What happens if I overdose? Seek emergency medical attention or call the Poison Help line at 1-907.445.9566. Overdose symptoms may include loss of appetite, vomiting, fever, blood in your urine, yellowing of your skin or eyes, confusion, or loss of consciousness. What should I avoid while using sulfamethoxazole and trimethoprim? Antibiotic medicines can cause diarrhea, which may be a sign of a new infection. If you have diarrhea that is watery or bloody, call your doctor before using anti-diarrhea medicine. If you use the injection form of this medicine, do not eat or drink anything that contains propylene glycol (an ingredient in many processed foods, soft drinks, and medicines). Dangerous effects could occur. Sulfamethoxazole and trimethoprim could make you sunburn more easily. Avoid sunlight or tanning beds. Wear protective clothing and use sunscreen (SPF 30 or higher) when you are outdoors. What are the possible side effects of sulfamethoxazole and trimethoprim? Get emergency medical help if you have signs of an allergic reaction (hives, cough, shortness of breath, swelling in your face or throat) or a severe skin reaction (fever, sore throat, burning eyes, skin pain, red or purple skin rash with blistering and peeling).   Call your doctor at once if you have:  · severe stomach pain, diarrhea that is watery or bloody (even if it occurs months after your last dose);  · a skin rash, no matter how mild;  · yellowing of your skin or eyes;  · a seizure;  · new or unusual joint pain;  · increased or decreased urination;  · swelling, bruising, or irritation around the IV needle;  · increased thirst, dry mouth, fruity breath odor;  · an electrolyte imbalance --headache, confusion, weakness, slurred speech, tingly feeling, chest pain, irregular heartbeats, loss of coordination or movement, feeling unsteady; or · low blood cell counts --fever, chills, mouth sores, skin sores, easy bruising, unusual bleeding, pale skin, cold hands and feet, feeling light-headed or short of breath. Common side effects may include:  · nausea, vomiting, loss of appetite; or  · mild itching or rash. This is not a complete list of side effects and others may occur. Call your doctor for medical advice about side effects. You may report side effects to FDA at 1-904-VEN-1681. What other drugs will affect sulfamethoxazole and trimethoprim? You may need more frequent check- ups or medical tests if you also use medicine to treat depression, diabetes, seizures, or HIV. Many drugs can interact, and some drugs should not be used together. Tell your doctor about all your current medicines, especially:  · amantadine, cyclosporine, indomethacin, leucovorin, methotrexate, pyrimethamine;  · an \"ACE inhibitor\" heart or blood presure medication (benazepril, enalapril, lisinopril, quinapril, ramipril, and others); or  · a diuretic or \"water pill\" (chlorthalidone, hydrochlorothiazide, and others). This list is not complete and many other drugs may affect sulfamethoxazole and trimethoprim. This includes prescription and over-the-counter medicines, vitamins, and herbal products. Not all possible drug interactions are listed here. Where can I get more information? Your pharmacist can provide more information about sulfamethoxazole and trimethoprim. Remember, keep this and all other medicines out of the reach of children, never share your medicines with others, and use this medication only for the indication prescribed. Every effort has been made to ensure that the information provided by [address] is accurate, up-to-date, and complete, but no guarantee is made to that effect. Drug information contained herein may be time sensitive. [facility] information has been compiled for use by healthcare practitioners and consumers in the [place] and therefore [facility] does not warrant that uses outside of the [place] are appropriate, unless specifically indicated otherwise. [facility]'s drug information does not endorse drugs, diagnose patients or recommend therapy. [facility]'s drug information is an informational resource designed to assist licensed healthcare practitioners in caring for their patients and/or to serve consumers viewing this service as a supplement to, and not a substitute for, the expertise, skill, knowledge and judgment of healthcare practitioners. The absence of a warning for a given drug or drug combination in no way should be construed to indicate that the drug or drug combination is safe, effective or appropriate for any given patient. [facility] does not assume any responsibility for any aspect of healthcare administered with the aid of information [facility] provides. The information contained herein is not intended to cover all possible uses, directions, precautions, warnings, drug interactions, allergic reactions, or adverse effects. If you have questions about the drugs you are taking, check with your doctor, nurse or pharmacist.  Copyright 7518-5403 [address] Version: 9.01. Revision date: 3/8/2019. Care instructions adapted under license by [facility] ([facility] [place] If you have questions about a medical condition or this instruction, always ask your healthcare professional. [place] [address] any warranty or liability for your use of this information. no

## 2025-01-18 NOTE — PROGRESS NOTE ADULT - SUBJECTIVE AND OBJECTIVE BOX
Patient is a 49y old  Female who presents with a chief complaint of Acute on chronic HFrEF (2025 20:22)      OVERNIGHT EVENTS:    MEDICATIONS  (STANDING):  albuterol/ipratropium for Nebulization 3 milliLiter(s) Nebulizer every 6 hours  amLODIPine   Tablet 10 milliGRAM(s) Oral daily  aspirin enteric coated 81 milliGRAM(s) Oral daily  carvedilol 25 milliGRAM(s) Oral every 12 hours  dextrose 5%. 1000 milliLiter(s) (50 mL/Hr) IV Continuous <Continuous>  dextrose 5%. 1000 milliLiter(s) (100 mL/Hr) IV Continuous <Continuous>  dextrose 50% Injectable 25 Gram(s) IV Push once  dextrose 50% Injectable 12.5 Gram(s) IV Push once  dextrose 50% Injectable 25 Gram(s) IV Push once  fluticasone propionate 50 MICROgram(s)/spray Nasal Spray 1 Spray(s) Both Nostrils two times a day  furosemide   Injectable 40 milliGRAM(s) IV Push <User Schedule>  glucagon  Injectable 1 milliGRAM(s) IntraMuscular once  heparin   Injectable 5000 Unit(s) SubCutaneous every 12 hours  insulin lispro (ADMELOG) corrective regimen sliding scale   SubCutaneous three times a day before meals  sacubitril 97 mG/valsartan 103 mG 1 Tablet(s) Oral two times a day    MEDICATIONS  (PRN):  acetaminophen     Tablet .. 650 milliGRAM(s) Oral every 6 hours PRN Temp greater or equal to 38C (100.4F), Mild Pain (1 - 3)  aluminum hydroxide/magnesium hydroxide/simethicone Suspension 30 milliLiter(s) Oral every 4 hours PRN Dyspepsia  dextrose Oral Gel 15 Gram(s) Oral once PRN Blood Glucose LESS THAN 70 milliGRAM(s)/deciliter  guaiFENesin Oral Liquid (Sugar-Free) 200 milliGRAM(s) Oral every 6 hours PRN Cough  melatonin 3 milliGRAM(s) Oral at bedtime PRN Insomnia      Allergies    lisinopril (Other)    Intolerances        SUBJECTIVE: in bed in NAD, no acute events overnight       Vital Signs Last 24 Hrs  T(C): 37.3 (2025 15:32), Max: 37.3 (2025 15:32)  T(F): 99.2 (2025 15:32), Max: 99.2 (2025 15:32)  HR: 82 (2025 15:32) (79 - 91)  BP: 140/86 (2025 15:32) (95/80 - 143/93)  BP(mean): --  RR: 18 (2025 15:32) (12 - 23)  SpO2: 100% (2025 15:32) (94% - 100%)    Parameters below as of 2025 10:17  Patient On (Oxygen Delivery Method): nasal cannula      PHYSICAL EXAM:  GENERAL: NAD, well-groomed, well-developed  HEAD:  Atraumatic, Normocephalic  EYES: EOMI, PERRLA, conjunctiva and sclera clear  ENMT: No tonsillar erythema, exudates, or enlargement; Moist mucous membranes, Good dentition, No lesions  NECK: Supple,  CHEST/LUNG: coarse bs diffusely   HEART: Regular rate and rhythm; No murmurs, rubs, or gallops  ABDOMEN: Soft, Nontender, Nondistended; Bowel sounds present  EXTREMITIES:  2+ Peripheral Pulses, No clubbing, cyanosis,+ edema BL LE  SKIN: No rashes or lesions  NERVOUS SYSTEM:  Alert & Oriented X3, Good concentration; Motor Strength 5/5 B/L upper and lower extremities;   DTRs 2+ intact and symmetric, sensation intact BL    LABS:                                 10.0   5.63  )-----------( 159      ( 2025 06:55 )             29.6   -18    141  |  108  |  28[H]  ----------------------------<  109[H]  3.3[L]   |  28  |  2.14[H]    Ca    8.2[L]      2025 06:55  Phos  3.9     -  Mg     2.2     -18    TPro  7.0  /  Alb  2.9[L]  /  TBili  0.5  /  DBili  x   /  AST  18  /  ALT  19  /  AlkPhos  96  -18          Urinalysis Basic - ( 2025 09:30 )    Color: Yellow / Appearance: Clear / S.018 / pH: x  Gluc: x / Ketone: Negative mg/dL  / Bili: Negative / Urobili: 1.0 mg/dL   Blood: x / Protein: 30 mg/dL / Nitrite: Positive   Leuk Esterase: Negative / RBC: 2 /HPF / WBC 2 /HPF   Sq Epi: x / Non Sq Epi: x / Bacteria: Moderate /HPF      Cultures;   CAPILLARY BLOOD GLUCOSE        Lipid panel:           RADIOLOGY & ADDITIONAL TESTS:  < from: Xray Chest 1 View- PORTABLE-Urgent (25 @ 17:07) >    IMPRESSION: Mild CHF somewhat increased prior.      < end of copied text >  < from: TTE Echo Complete w/o Contrast w/ Doppler (24 @ 10:17) >  ___________________________________________________________________________  ____________    CONCLUSIONS:     1. Left ventricular cavity is mildly dilated. Left ventricular systolic   function is mildly decreased with an ejection fraction visually estimated   at 45 to 50 %.   2. Severe left ventricular hypertrophy.   3. There is severe (grade 3) left ventricular diastolic dysfunction.   4. Normal right ventricular cavity size and mildly increased wall   thickness,.   5. Left atrium is severely dilated.   6. Mildmitral valve leaflet calcification.   7. Mild mitral valve stenosis.   8. Mild to moderate mitral regurgitation.   9. Moderate tricuspid regurgitation.  10. Moderate aortic regurgitation.  11. Moderate pulmonic regurgitation.  12. Small pericardial effusion with no evidence of hemodynamic compromise   (or echocardiographic evidence of cardiac tamponade). There is some RA   collapse, but this lacks specificity and not diagnostic in itself.  13. Estimated pulmonary artery systolic pressure is 44 mmHg.    < end of copied text >  < from: TTE Echo Complete w/o Contrast w/ Doppler (24 @ 10:17) >  ___________________________________________________________________________  ____________    Imaging Personally Reviewed:  [ x] YES      Consultant(s) Notes Reviewed:  [x ] YES     Care Discussed with [x ] Consultants [X ] Patient [x ] Family  [x ]    [x ]  Other; RN

## 2025-01-18 NOTE — PATIENT PROFILE ADULT - ARE SIGNIFICANT INDICATORS COMPLETE.
What are Prescription Custom Orthotics?  Custom orthotics are specially-made devices designed to support and comfort your feet. Prescription orthotics are crafted for you and no one else. They match the contours of your feet precisely and are designed for the way you move. Orthotics are only manufactured after a podiatrist has conducted a complete evaluation of your feet, ankles, and legs, so the orthotic can accommodate your unique foot structure and pathology.  Prescription orthotics are divided into two categories:    Functional orthotics are designed to control abnormal motion. They may be used to treat foot pain caused by abnormal motion; they can also be used to treat injuries such as shin splints or tendinitis. Functional orthotics are usually crafted of a semi-rigid material such as plastic or graphite.    Accommodative orthotics are softer and meant to provide additional cushioning and support. They can be used to treat diabetic foot ulcers, painful calluses on the bottom of the foot, and other uncomfortable conditions.  Podiatrists use orthotics to treat foot problems such as plantar fasciitis, bursitis, tendinitis, diabetic foot ulcers, and foot, ankle, and heel pain. Clinical research studies have shown that podiatrist-prescribed foot orthotics decrease foot pain and improve function.  Orthotics typically cost more than shoe inserts purchased in a retail store, but the additional cost is usually well worth it. Unlike shoe inserts, orthotics are molded to fit each individual foot, so you can be sure that your orthotics fit and do what they're supposed to do. Prescription orthotics are also made of top-notch materials and last many years when cared for properly. Insurance often helps pay for prescription orthotics.  What are Shoe Inserts?   You've seen them at the grocery store and at the mall. You've probably even seen them on TV and online. Shoe inserts are any kind of non-prescription foot support  designed to be worn inside a shoe. Pre-packaged, mass produced, arch supports are shoe inserts. So are the  custom-made  insoles and foot supports that you can order online or at retail stores. Unless the device has been prescribed by a doctor and crafted for your specific foot, it's a shoe insert, not a custom orthotic device--despite what the ads might say.  Shoe inserts can be very helpful for a variety of foot ailments, including flat arches and foot and leg pain. They can cushion your feet, provide comfort, and support your arches, but they can't correct biomechanical foot problems or cure long-standing foot issues.  The most common types of shoe inserts are:    Arch supports: Some people have high arches. Others have low arches or flat feet. Arch supports generally have a  bumped-up  appearance and are designed to support the foot's natural arch.     Insoles: Insoles slip into your shoe to provide extra cushioning and support. Insoles are often made of gel, foam, or plastic.     Heel liners: Heel liners, sometimes called heel pads or heel cups, provide extra cushioning in the heel region. They may be especially useful for patients who have foot pain caused by age-related thinning of the heels' natural fat pads.     Foot cushions: Do your shoes rub against your heel or your toes? Foot cushions come in many different shapes and sizes and can be used as a barrier between you and your shoe.  Choosing an Over-the-Counter Shoe Insert  Selecting a shoe insert from the wide variety of devices on the market can be overwhelming. Here are some podiatrist-tested tips to help you find the insert that best meets your needs:    Consider your health. Do you have diabetes? Problems with circulation? An over-the-counter insert may not be your best bet. Diabetes and poor circulation increase your risk of foot ulcers and infections, so schedule an appointment with a podiatrist. He or she can help you select a solution that won't  cause additional health problems.     Think about the purpose. Are you planning to run a marathon, or do you just need a little arch support in your work shoes? Look for a product that fits your planned level of activity.     Bring your shoes. For the insert to be effective, it has to fit into your shoes. So bring your sneakers, dress shoes, or work boots--whatever you plan to wear with your insert. Look for an insert that will fit the contours of your shoe.     Try them on. If all possible, slip the insert into your shoe and try it out. Walk around a little. How does it feel? Don't assume that feelings of pressure will go away with continued wear. (If you can't try the inserts at the store, ask about the store's return policy and hold on to your receipt.)    Please call one of the [facility] locations below to schedule an appointment. If you received a prescription please bring it with you to your appointment. Some locations are limited to what they carry.    Office Locations    [facility]  [address] [place] [address]  Home Medical Equipment, [address]   Phone: [phone]   Orthotics and Prosthetics, [address]   Phone: [phone]    [facility]  Home Medical Equipment  [address] [address]   Phone: [phone]    [facility] ([facility])    [address], [address], [place] [address]  Phone: [phone]    [facility]  [address]   [place] [address]   Phone: [phone]    [facility]  [address]  [place] [address]  Phone: [phone]    [facility]   [address]  [place] [address]  Phone: [phone]    [facility]  [address]  [place] [address]  Phone: [phone]    [facility]  [address]  [place] [address]  Phone: [phone]    [address] [place] [address]   Phone: [phone]     No

## 2025-01-19 VITALS
DIASTOLIC BLOOD PRESSURE: 83 MMHG | RESPIRATION RATE: 18 BRPM | HEART RATE: 86 BPM | SYSTOLIC BLOOD PRESSURE: 151 MMHG | OXYGEN SATURATION: 97 % | TEMPERATURE: 98 F

## 2025-01-19 LAB
ALBUMIN SERPL ELPH-MCNC: 3.1 G/DL — LOW (ref 3.3–5)
ALBUMIN SERPL ELPH-MCNC: 3.1 G/DL — LOW (ref 3.3–5)
ALP SERPL-CCNC: 104 U/L — SIGNIFICANT CHANGE UP (ref 40–120)
ALP SERPL-CCNC: 111 U/L — SIGNIFICANT CHANGE UP (ref 40–120)
ALT FLD-CCNC: 19 U/L — SIGNIFICANT CHANGE UP (ref 12–78)
ALT FLD-CCNC: 22 U/L — SIGNIFICANT CHANGE UP (ref 12–78)
ANION GAP SERPL CALC-SCNC: 7 MMOL/L — SIGNIFICANT CHANGE UP (ref 5–17)
ANION GAP SERPL CALC-SCNC: 8 MMOL/L — SIGNIFICANT CHANGE UP (ref 5–17)
AST SERPL-CCNC: 13 U/L — LOW (ref 15–37)
AST SERPL-CCNC: 14 U/L — LOW (ref 15–37)
BILIRUB DIRECT SERPL-MCNC: 0.1 MG/DL — SIGNIFICANT CHANGE UP (ref 0–0.3)
BILIRUB INDIRECT FLD-MCNC: 0.3 MG/DL — SIGNIFICANT CHANGE UP (ref 0.2–1)
BILIRUB SERPL-MCNC: 0.4 MG/DL — SIGNIFICANT CHANGE UP (ref 0.2–1.2)
BILIRUB SERPL-MCNC: 0.5 MG/DL — SIGNIFICANT CHANGE UP (ref 0.2–1.2)
BUN SERPL-MCNC: 33 MG/DL — HIGH (ref 7–23)
BUN SERPL-MCNC: 34 MG/DL — HIGH (ref 7–23)
CALCIUM SERPL-MCNC: 8.5 MG/DL — SIGNIFICANT CHANGE UP (ref 8.5–10.1)
CALCIUM SERPL-MCNC: 8.6 MG/DL — SIGNIFICANT CHANGE UP (ref 8.5–10.1)
CHLORIDE SERPL-SCNC: 107 MMOL/L — SIGNIFICANT CHANGE UP (ref 96–108)
CHLORIDE SERPL-SCNC: 108 MMOL/L — SIGNIFICANT CHANGE UP (ref 96–108)
CO2 SERPL-SCNC: 24 MMOL/L — SIGNIFICANT CHANGE UP (ref 22–31)
CO2 SERPL-SCNC: 26 MMOL/L — SIGNIFICANT CHANGE UP (ref 22–31)
CREAT SERPL-MCNC: 2.14 MG/DL — HIGH (ref 0.5–1.3)
CREAT SERPL-MCNC: 2.2 MG/DL — HIGH (ref 0.5–1.3)
CREAT SERPL-MCNC: 2.23 MG/DL — HIGH (ref 0.5–1.3)
CULTURE RESULTS: ABNORMAL
EGFR: 26 ML/MIN/1.73M2 — LOW
EGFR: 27 ML/MIN/1.73M2 — LOW
EGFR: 28 ML/MIN/1.73M2 — LOW
GLUCOSE SERPL-MCNC: 212 MG/DL — HIGH (ref 70–99)
GLUCOSE SERPL-MCNC: 217 MG/DL — HIGH (ref 70–99)
HCT VFR BLD CALC: 31.1 % — LOW (ref 34.5–45)
HGB BLD-MCNC: 10.4 G/DL — LOW (ref 11.5–15.5)
INR BLD: 1.1 RATIO — SIGNIFICANT CHANGE UP (ref 0.85–1.16)
LDH SERPL L TO P-CCNC: 301 U/L — HIGH (ref 50–242)
MAGNESIUM SERPL-MCNC: 2.1 MG/DL — SIGNIFICANT CHANGE UP (ref 1.6–2.6)
MAGNESIUM SERPL-MCNC: 2.1 MG/DL — SIGNIFICANT CHANGE UP (ref 1.6–2.6)
MCHC RBC-ENTMCNC: 25 PG — LOW (ref 27–34)
MCHC RBC-ENTMCNC: 33.4 G/DL — SIGNIFICANT CHANGE UP (ref 32–36)
MCV RBC AUTO: 74.8 FL — LOW (ref 80–100)
NRBC # BLD: 0 /100 WBCS — SIGNIFICANT CHANGE UP (ref 0–0)
NRBC BLD-RTO: 0 /100 WBCS — SIGNIFICANT CHANGE UP (ref 0–0)
PHOSPHATE SERPL-MCNC: 3.8 MG/DL — SIGNIFICANT CHANGE UP (ref 2.5–4.5)
PHOSPHATE SERPL-MCNC: 3.9 MG/DL — SIGNIFICANT CHANGE UP (ref 2.5–4.5)
PLATELET # BLD AUTO: 176 K/UL — SIGNIFICANT CHANGE UP (ref 150–400)
POTASSIUM SERPL-MCNC: 4.1 MMOL/L — SIGNIFICANT CHANGE UP (ref 3.5–5.3)
POTASSIUM SERPL-MCNC: 4.1 MMOL/L — SIGNIFICANT CHANGE UP (ref 3.5–5.3)
POTASSIUM SERPL-SCNC: 4.1 MMOL/L — SIGNIFICANT CHANGE UP (ref 3.5–5.3)
POTASSIUM SERPL-SCNC: 4.1 MMOL/L — SIGNIFICANT CHANGE UP (ref 3.5–5.3)
PROT SERPL-MCNC: 7.7 GM/DL — SIGNIFICANT CHANGE UP (ref 6–8.3)
PROT SERPL-MCNC: 7.8 GM/DL — SIGNIFICANT CHANGE UP (ref 6–8.3)
PROTHROM AB SERPL-ACNC: 12.8 SEC — SIGNIFICANT CHANGE UP (ref 9.9–13.4)
RBC # BLD: 4.16 M/UL — SIGNIFICANT CHANGE UP (ref 3.8–5.2)
RBC # FLD: 15.5 % — HIGH (ref 10.3–14.5)
SODIUM SERPL-SCNC: 139 MMOL/L — SIGNIFICANT CHANGE UP (ref 135–145)
SODIUM SERPL-SCNC: 141 MMOL/L — SIGNIFICANT CHANGE UP (ref 135–145)
SPECIMEN SOURCE: SIGNIFICANT CHANGE UP
WBC # BLD: 5.45 K/UL — SIGNIFICANT CHANGE UP (ref 3.8–10.5)
WBC # FLD AUTO: 5.45 K/UL — SIGNIFICANT CHANGE UP (ref 3.8–10.5)

## 2025-01-19 PROCEDURE — 99239 HOSP IP/OBS DSCHRG MGMT >30: CPT

## 2025-01-19 PROCEDURE — 99223 1ST HOSP IP/OBS HIGH 75: CPT

## 2025-01-19 RX ORDER — BUMETANIDE 2 MG/1
1 TABLET ORAL
Qty: 30 | Refills: 0
Start: 2025-01-19 | End: 2025-02-17

## 2025-01-19 RX ORDER — FLUTICASONE PROPIONATE 50 UG/1
1 SPRAY, METERED NASAL
Qty: 0 | Refills: 0 | DISCHARGE
Start: 2025-01-19

## 2025-01-19 RX ORDER — IPRATROPIUM BROMIDE AND ALBUTEROL SULFATE .5; 2.5 MG/3ML; MG/3ML
3 SOLUTION RESPIRATORY (INHALATION)
Qty: 300 | Refills: 0
Start: 2025-01-19 | End: 2025-02-17

## 2025-01-19 RX ORDER — ALBUTEROL 90 MCG
2 AEROSOL REFILL (GRAM) INHALATION
Qty: 1 | Refills: 0
Start: 2025-01-19 | End: 2025-02-17

## 2025-01-19 RX ADMIN — IPRATROPIUM BROMIDE AND ALBUTEROL SULFATE 3 MILLILITER(S): .5; 2.5 SOLUTION RESPIRATORY (INHALATION) at 00:06

## 2025-01-19 RX ADMIN — Medication 25 MILLIGRAM(S): at 06:03

## 2025-01-19 RX ADMIN — Medication 40 MILLIGRAM(S): at 06:03

## 2025-01-19 RX ADMIN — Medication 200 MILLIGRAM(S): at 13:25

## 2025-01-19 RX ADMIN — Medication 10 MILLIGRAM(S): at 06:03

## 2025-01-19 RX ADMIN — Medication 200 MILLIGRAM(S): at 06:02

## 2025-01-19 RX ADMIN — ASPIRIN 81 MILLIGRAM(S): 81 TABLET, COATED ORAL at 13:26

## 2025-01-19 RX ADMIN — FLUTICASONE PROPIONATE 1 SPRAY(S): 50 SPRAY, METERED NASAL at 06:02

## 2025-01-19 RX ADMIN — DEXAMETHASONE SODIUM PHOSPHATE 6 MILLIGRAM(S): 4 INJECTION, SOLUTION INTRA-ARTICULAR; INTRALESIONAL; INTRAMUSCULAR; INTRAVENOUS; SOFT TISSUE at 06:19

## 2025-01-19 RX ADMIN — IPRATROPIUM BROMIDE AND ALBUTEROL SULFATE 3 MILLILITER(S): .5; 2.5 SOLUTION RESPIRATORY (INHALATION) at 11:51

## 2025-01-19 RX ADMIN — SACUBITRIL AND VALSARTAN 1 TABLET(S): 49; 51 TABLET, FILM COATED ORAL at 06:03

## 2025-01-19 RX ADMIN — IPRATROPIUM BROMIDE AND ALBUTEROL SULFATE 3 MILLILITER(S): .5; 2.5 SOLUTION RESPIRATORY (INHALATION) at 05:28

## 2025-01-19 RX ADMIN — Medication 40 MILLIGRAM(S): at 13:26

## 2025-01-19 NOTE — PROGRESS NOTE ADULT - TIME BILLING
37 minutes aggregate care time spent on encounter; activities included   direct patient care, counseling and/or coordinating care reviewing notes, lab data/ imaging , discussion with multidisciplinary team/ patient  /family.

## 2025-01-19 NOTE — DISCHARGE NOTE NURSING/CASE MANAGEMENT/SOCIAL WORK - PATIENT PORTAL LINK FT
You can access the FollowMyHealth Patient Portal offered by [facility] by registering at the following website: [email or web address]. By joining [facility]’s FollowMyHealth portal, you will also be able to view your health information using other applications (apps) compatible with our system.

## 2025-01-19 NOTE — PROGRESS NOTE ADULT - PROBLEM SELECTOR PROBLEM 1
Internal Medicine Progress Note    Subjective     Chief Complaint   Patient presents with   • Leg Swelling       Interval Events:  HPI patient is doing well no breathing difficulty no cough congestion. Patient is getting PVCs cardiology consultation requested.    Inpatient Medications  Current Facility-Administered Medications   Medication Dose Route Frequency Provider Last Rate Last Admin   • atorvastatin (LIPITOR) tablet 40 mg  40 mg Oral Daily Pernell Matias MD   40 mg at 01/25/22 0855   • losartan (COZAAR) tablet 100 mg  100 mg Oral BID Pernell Matias MD   100 mg at 01/25/22 0855   • [START ON 1/26/2022] metFORMIN (GLUCOPHAGE) tablet 500 mg  500 mg Oral Daily with breakfast Pernell Matias MD       • NIFEdipine XL (PROCARDIA XL) ER tablet 30 mg  30 mg Oral Daily Pernell Matias MD   30 mg at 01/25/22 0855   • acetaminophen (TYLENOL) tablet 650 mg  650 mg Oral Q4H PRN Pernell Matias MD       • ondansetron (ZOFRAN) injection 4 mg  4 mg Intravenous Q12H PRN Pernell Matias MD       • enoxaparin (LOVENOX) injection 80 mg  1 mg/kg Subcutaneous Q12H Pernell Matias MD   80 mg at 01/25/22 1746   • fenofibrate micronized (LOFIBRA) capsule 134 mg  134 mg Oral Daily with breakfast Pernell Matias MD   134 mg at 01/25/22 0855         Review of Systems  Awake alert oriented no distress appropriate response through the   HEENT no sore throat and mild cough no ophthalmic symptoms  Lungs mild cough no shortness of breath  Heart history of hypertension hyperlipidemia  Abdomen no nausea vomiting no diarrhea  CNS unremarkable  Psychiatric denies depression  Extremities as per history  Endocrine history of diabetes type 2       Objective   Vitals with min/max:      Vital Last Value 24 Hour Range   Temperature 98.1 °F (36.7 °C) (01/25/22 1309) Temp  Min: 98.1 °F (36.7 °C)  Max: 99 °F (37.2 °C)   Pulse (!) 51 (01/25/22 1309) Pulse  Min: 50  Max: 66   Respiratory 18 (01/25/22 1309) Resp  Min: 18  Max: 22 
  Non-Invasive  Blood Pressure (!) 141/59 (01/25/22 1309) BP  Min: 141/59  Max: 145/60   Pulse Oximetry 96 % (01/25/22 1309) SpO2  Min: 94 %  Max: 97 %   Arterial   Blood Pressure   No data recorded      Physical Exam  Patient is awake alert oriented x3 no distress patient is in the bed  Neck no thyromegaly  HEENT extraocular movement intact  Lungs no rhonchi no rales  Heart regular rhythm S1-S2  Abdomen soft nontender  Extremities lower left calf soft nontender Homans negative next line right calf no swelling no rash no edema  CNS no focal weakness  Psychiatric appropriate affect and speech     Labs   Admission on 01/23/2022   Component Date Value Ref Range Status   • Extra Tube 01/23/2022 Hold for Add Ons   Final   • Extra Tube 01/23/2022 Hold for Add Ons   Final   • Extra Tube 01/23/2022 Hold for Add Ons   Final   • Extra Tube 01/23/2022 Hold for Add Ons   Final   • Extra Tube 01/23/2022 Hold for Add Ons   Final   • Sodium 01/23/2022 139  135 - 145 mmol/L Final   • Potassium 01/23/2022 4.3  3.4 - 5.1 mmol/L Final   • Chloride 01/23/2022 102  98 - 107 mmol/L Final   • Carbon Dioxide 01/23/2022 26  21 - 32 mmol/L Final   • Anion Gap 01/23/2022 15  10 - 20 mmol/L Final   • Glucose 01/23/2022 191 (A) 70 - 99 mg/dL Final   • BUN 01/23/2022 16  6 - 20 mg/dL Final   • Creatinine 01/23/2022 1.08  0.67 - 1.17 mg/dL Final   • Glomerular Filtration Rate 01/23/2022 67  >=60 Final    eGFR results = or >60 mL/min/1.73m2 = Normal kidney function. Estimated GFR calculated using the 2009 CKD-EPI creatinine equation.     • BUN/ Creatinine Ratio 01/23/2022 15  7 - 25 Final   • Calcium 01/23/2022 8.5  8.4 - 10.2 mg/dL Final   • Bilirubin, Total 01/23/2022 0.4  0.2 - 1.0 mg/dL Final   • GOT/AST 01/23/2022 19  <=37 Units/L Final   • GPT/ALT 01/23/2022 28  <64 Units/L Final   • Alkaline Phosphatase 01/23/2022 70  45 - 117 Units/L Final   • Albumin 01/23/2022 3.6  3.6 - 5.1 g/dL Final   • Protein, Total 01/23/2022 7.2  6.4 - 8.2 g/dL 
Final   • Globulin 01/23/2022 3.6  2.0 - 4.0 g/dL Final   • A/G Ratio 01/23/2022 1.0  1.0 - 2.4 Final   • PTT 01/23/2022 24  22 - 30 sec Final   • Prothrombin Time 01/23/2022 10.4  9.7 - 11.8 sec Final   • INR 01/23/2022 1.0    Final    INR Therapeutic Range: 2.0 to 3.0 (2.5 to 3.5 recommended for recurrent thrombotic episodes and mechanical prosthetic heart valves.)   • WBC 01/23/2022 6.1  4.2 - 11.0 K/mcL Final   • RBC 01/23/2022 4.50  4.50 - 5.90 mil/mcL Final   • HGB 01/23/2022 13.6  13.0 - 17.0 g/dL Final   • HCT 01/23/2022 41.4  39.0 - 51.0 % Final   • MCV 01/23/2022 92.0  78.0 - 100.0 fl Final   • MCH 01/23/2022 30.2  26.0 - 34.0 pg Final   • MCHC 01/23/2022 32.9  32.0 - 36.5 g/dL Final   • RDW-CV 01/23/2022 13.3  11.0 - 15.0 % Final   • RDW-SD 01/23/2022 45.4  39.0 - 50.0 fL Final   • PLT 01/23/2022 164  140 - 450 K/mcL Final   • NRBC 01/23/2022 0  <=0 /100 WBC Final   • Neutrophil, Percent 01/23/2022 56  % Final   • Lymphocytes, Percent 01/23/2022 27  % Final   • Mono, Percent 01/23/2022 11  % Final   • Eosinophils, Percent 01/23/2022 6  % Final   • Basophils, Percent 01/23/2022 0  % Final   • Immature Granulocytes 01/23/2022 0  % Final   • Absolute Neutrophils 01/23/2022 3.4  1.8 - 7.7 K/mcL Final   • Absolute Lymphocytes 01/23/2022 1.6  1.0 - 4.0 K/mcL Final   • Absolute Monocytes 01/23/2022 0.6  0.3 - 0.9 K/mcL Final   • Absolute Eosinophils  01/23/2022 0.4  0.0 - 0.5 K/mcL Final   • Absolute Basophils 01/23/2022 0.0  0.0 - 0.3 K/mcL Final   • Absolute Immmature Granulocytes 01/23/2022 0.0  0.0 - 0.2 K/mcL Final   • GLUCOSE, BEDSIDE - POINT OF CARE 01/24/2022 123 (A) 70 - 99 mg/dL Final   • GLUCOSE, BEDSIDE - POINT OF CARE 01/24/2022 127 (A) 70 - 99 mg/dL Final       Imaging  CTA CHEST PULMONARY EMBOLISM W CONTRAST   Final Result   Noted is moderate saddle nonocclusive filling defect in the   bilateral main pulmonary artery branches with extension into the right   lower lung /middle lobe pulmonary artery 
Acute on chronic HFrEF (heart failure with reduced ejection fraction)
and the proximal left upper lobe   pulmonary artery.  The heart size is borderline.  No pericardial effusion.    The thoracic aorta shows normal caliber.       The lungs are hyper aerated with moderate bleeding motion which limits the   evaluation.  There is no focal consolidations or mass.  No pneumothorax,   pleural effusion, or lymphadenopathy in the mediastinum are detected.       The visualized liver shows decreased attenuation without mass or abnormal   enhancement.  Noted is contrast reflux into the IVC and hepatic veins,   which may represent right heart abnormal function..  There is a small   hiatal hernia.  There is generalized osteopenia and mild spondylosis of   spine.        IMPRESSION:   1.  Moderate nonocclusive saddle PE in the bilateral main pulmonary   branches extending into the proximal aspect of the right middle and lower   lobe pulmonary artery and left upper lobe pulmonary artery.   2.  Emphysema with significant breathing motion.  There is no discrete   airspace consolidations.        Comment: Findings have been discussed with Dr. Kahn at the time of the   dictation.      Electronically Signed by: KAI DACOSTA MD    Signed on: 1/23/2022 3:49 PM          US VASC LOWER EXTREMITY VENOUS DUPLEX LEFT   Final Result   Acute occlusive/partially occlusive DVT in the left lower   extremity.      Electronically Signed by: KAI DACOSTA MD    Signed on: 1/23/2022 2:55 PM              Microbiology Results     None          Assessment and Plan    Assessment/Plan:  COVID-19 infection mild  Patient vaccinated with the 2 separate injections x2  DVT left leg   bilateral saddle PE  Hypertension  Hyperlipidemia  Diabetes mellitus  Continue Lovenox therapeutic dose  Continue home medications  PVC s check echo, cardiology consult      Code Status    Code Status: Full Resuscitation      [This note used Dragon technology. Transcription errors are not uncommon and may not have been corrected prior to electronically 
signing the note. Should you find these errors, please consult the clinician for interpretation (or apply common sense adjustment when safe and appropriate).]    Pernell Matias MD  1/25/2022 7:23 PM   
Acute on chronic HFrEF (heart failure with reduced ejection fraction)
Acute on chronic HFrEF (heart failure with reduced ejection fraction)

## 2025-01-19 NOTE — DISCHARGE NOTE PROVIDER - PROVIDER TOKENS
PROVIDER:[TOKEN:[[id number]]],FREE:[LAST:[follow up with your primary care doctor and cardiologist],PHONE:[(   )    -],FAX:[(   )    -]]

## 2025-01-19 NOTE — CONSULT NOTE ADULT - ASSESSMENT
49F HTN, DM, CKD, HFmrEF EF 45% who presented with acute on chronic CHF exacerbation.    TTE suggestive of non-obstructive HCM vs. infiltrative cardiomyopathy  No role for ICD or antiarrhythmics at this time  euvolemic on exam, at high risk for readmission due to multiple factors and as such will  make some changes to her home medications and recommend further evaluation of her cardiomyopathy    -cont carvedilol 25mg   -cont Entresto  -cont amlodipine  -start spironolactone 25mg daily (has f/u appt with her outpt cardiologist 1/23) and check both SCr and K as outpt  -on furosemide 40mg PO as outpt - recommend changing to 2mg PO bumex as maintenance diuretic  -recommend outpt ischemic eval as none done recently in addition to amyloid eval  -CHF precautions   49F HTN, DM, CKD, HFmrEF EF 45% who presented with acute on chronic CHF exacerbation.    TTE suggestive of non-obstructive HCM vs. infiltrative cardiomyopathy  No role for ICD or antiarrhythmics at this time  euvolemic on exam, at high risk for readmission due to multiple factors and as such will  make some changes to her home medications and recommend further evaluation of her cardiomyopathy    -cont carvedilol 25mg   -cont Entresto  -cont amlodipine  -start spironolactone 25mg daily (has f/u appt with her outpt cardiologist 1/23) and check both SCr and K as outpt  -on furosemide 40mg PO as outpt - recommend changing to 2mg PO bumex as maintenance diuretic  -recommend outpt ischemic eval as none done recently in addition to amyloid eval  -CHF precautions  -recommend follow-up with advanced heart failure team

## 2025-01-19 NOTE — PROGRESS NOTE ADULT - PROBLEM SELECTOR PLAN 2
- per my colleague's documentation "COVID positive  - Patient asymptomatic.  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   d- dimer elevated and now acute resp failure- with hypoxia - will check inflammatory markers start
- per my colleague's documentation "COVID positive  - Patient asymptomatic.  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
- per my colleague's documentation "COVID positive  - Patient asymptomatic.  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   d- dimer elevated and now acute resp failure- with hypoxia - will check inflammatory markers start

## 2025-01-19 NOTE — PROGRESS NOTE ADULT - PROBLEM SELECTOR PLAN 3
- Hold home PO meds  - ISS + FS achs

## 2025-01-19 NOTE — PROGRESS NOTE ADULT - PROBLEM SELECTOR PLAN 4
- P/w SCr 1.95, around baseline  - Avoid nephrotoxins

## 2025-01-19 NOTE — DISCHARGE NOTE NURSING/CASE MANAGEMENT/SOCIAL WORK - NSDCPEPT PROEDHF_GEN_ALL_CORE
Detail Level: Simple
Call primary care provider for follow up after discharge/Activities as tolerated/Low salt diet/Monitor weight daily/Report signs and symptoms to primary care provider
Include Location In Plan?: No
Detail Level: Detailed

## 2025-01-19 NOTE — DISCHARGE NOTE PROVIDER - CARE PROVIDERS DIRECT ADDRESSES
[email or web address]

## 2025-01-19 NOTE — PROGRESS NOTE ADULT - SUBJECTIVE AND OBJECTIVE BOX
Patient is a 49y old  Female who presents with a chief complaint of Acute on chronic HFrEF (2025 20:22)      OVERNIGHT EVENTS:   12 beats of nsvt  MEDICATIONS  (STANDING):  albuterol/ipratropium for Nebulization 3 milliLiter(s) Nebulizer every 6 hours  amLODIPine   Tablet 10 milliGRAM(s) Oral daily  aspirin enteric coated 81 milliGRAM(s) Oral daily  carvedilol 25 milliGRAM(s) Oral every 12 hours  dextrose 5%. 1000 milliLiter(s) (50 mL/Hr) IV Continuous <Continuous>  dextrose 5%. 1000 milliLiter(s) (100 mL/Hr) IV Continuous <Continuous>  dextrose 50% Injectable 25 Gram(s) IV Push once  dextrose 50% Injectable 12.5 Gram(s) IV Push once  dextrose 50% Injectable 25 Gram(s) IV Push once  fluticasone propionate 50 MICROgram(s)/spray Nasal Spray 1 Spray(s) Both Nostrils two times a day  furosemide   Injectable 40 milliGRAM(s) IV Push <User Schedule>  glucagon  Injectable 1 milliGRAM(s) IntraMuscular once  heparin   Injectable 5000 Unit(s) SubCutaneous every 12 hours  insulin lispro (ADMELOG) corrective regimen sliding scale   SubCutaneous three times a day before meals  sacubitril 97 mG/valsartan 103 mG 1 Tablet(s) Oral two times a day    MEDICATIONS  (PRN):  acetaminophen     Tablet .. 650 milliGRAM(s) Oral every 6 hours PRN Temp greater or equal to 38C (100.4F), Mild Pain (1 - 3)  aluminum hydroxide/magnesium hydroxide/simethicone Suspension 30 milliLiter(s) Oral every 4 hours PRN Dyspepsia  dextrose Oral Gel 15 Gram(s) Oral once PRN Blood Glucose LESS THAN 70 milliGRAM(s)/deciliter  guaiFENesin Oral Liquid (Sugar-Free) 200 milliGRAM(s) Oral every 6 hours PRN Cough  melatonin 3 milliGRAM(s) Oral at bedtime PRN Insomnia      Allergies    lisinopril (Other)    Intolerances        SUBJECTIVE: in bed in NAD, no acute events overnight     Vital Signs Last 24 Hrs  T(C): 36.6 (2025 05:07), Max: 37.3 (2025 15:32)  T(F): 97.9 (2025 05:07), Max: 99.2 (2025 15:32)  HR: 82 (2025 11:51) (77 - 95)  BP: 142/93 (2025 05:07) (139/87 - 152/90)  BP(mean): --  RR: 20 (2025 05:07) (18 - 20)  SpO2: 95% (2025 11:51) (95% - 100%)    Parameters below as of 2025 11:51  Patient On (Oxygen Delivery Method): nasal cannula, 2L      PHYSICAL EXAM:  GENERAL: NAD, well-groomed, well-developed  HEAD:  Atraumatic, Normocephalic  EYES: EOMI, PERRLA, conjunctiva and sclera clear  ENMT: No tonsillar erythema, exudates, or enlargement; Moist mucous membranes, Good dentition, No lesions  NECK: Supple,  CHEST/LUNG: coarse bs diffusely   HEART: Regular rate and rhythm; No murmurs, rubs, or gallops  ABDOMEN: Soft, Nontender, Nondistended; Bowel sounds present  EXTREMITIES:  2+ Peripheral Pulses, No clubbing, cyanosis,+ edema BL LE  SKIN: No rashes or lesions  NERVOUS SYSTEM:  Alert & Oriented X3, Good concentration; Motor Strength 5/5 B/L upper and lower extremities;   DTRs 2+ intact and symmetric, sensation intact BL    LABS:                                        10.4   5.45  )-----------( 176      ( 2025 07:25 )             31.1   -    141  |  107  |  34[H]  ----------------------------<  217[H]  4.1   |  26  |  2.20[H]    Ca    8.6      2025 07:25  Phos  3.9     -  Mg     2.1     -    TPro  7.8  /  Alb  3.1[L]  /  TBili  0.5  /  DBili  0.1  /  AST  13[L]  /  ALT  19  /  AlkPhos  111            Urinalysis Basic - ( 2025 09:30 )    Color: Yellow / Appearance: Clear / S.018 / pH: x  Gluc: x / Ketone: Negative mg/dL  / Bili: Negative / Urobili: 1.0 mg/dL   Blood: x / Protein: 30 mg/dL / Nitrite: Positive   Leuk Esterase: Negative / RBC: 2 /HPF / WBC 2 /HPF   Sq Epi: x / Non Sq Epi: x / Bacteria: Moderate /HPF      Cultures;   CAPILLARY BLOOD GLUCOSE        Lipid panel:           RADIOLOGY & ADDITIONAL TESTS:  < from: Xray Chest 1 View- PORTABLE-Urgent (25 @ 17:07) >    IMPRESSION: Mild CHF somewhat increased prior.      < end of copied text >  < from: TTE Echo Complete w/o Contrast w/ Doppler (24 @ 10:17) >  ___________________________________________________________________________  ____________    CONCLUSIONS:     1. Left ventricular cavity is mildly dilated. Left ventricular systolic   function is mildly decreased with an ejection fraction visually estimated   at 45 to 50 %.   2. Severe left ventricular hypertrophy.   3. There is severe (grade 3) left ventricular diastolic dysfunction.   4. Normal right ventricular cavity size and mildly increased wall   thickness,.   5. Left atrium is severely dilated.   6. Mildmitral valve leaflet calcification.   7. Mild mitral valve stenosis.   8. Mild to moderate mitral regurgitation.   9. Moderate tricuspid regurgitation.  10. Moderate aortic regurgitation.  11. Moderate pulmonic regurgitation.  12. Small pericardial effusion with no evidence of hemodynamic compromise   (or echocardiographic evidence of cardiac tamponade). There is some RA   collapse, but this lacks specificity and not diagnostic in itself.  13. Estimated pulmonary artery systolic pressure is 44 mmHg.    < end of copied text >  < from: TTE Echo Complete w/o Contrast w/ Doppler (24 @ 10:17) >  ___________________________________________________________________________  ____________    Imaging Personally Reviewed:  [ x] YES      Consultant(s) Notes Reviewed:  [x ] YES     Care Discussed with [x ] Consultants [X ] Patient [x ] Family  [x ]    [x ]  Other; RN Patient is a 49y old  Female who presents with a chief complaint of Acute on chronic HFrEF (2025 20:22)      OVERNIGHT EVENTS:   12 beats of nsvt  MEDICATIONS  (STANDING):  albuterol/ipratropium for Nebulization 3 milliLiter(s) Nebulizer every 6 hours  amLODIPine   Tablet 10 milliGRAM(s) Oral daily  aspirin enteric coated 81 milliGRAM(s) Oral daily  carvedilol 25 milliGRAM(s) Oral every 12 hours  dextrose 5%. 1000 milliLiter(s) (50 mL/Hr) IV Continuous <Continuous>  dextrose 5%. 1000 milliLiter(s) (100 mL/Hr) IV Continuous <Continuous>  dextrose 50% Injectable 25 Gram(s) IV Push once  dextrose 50% Injectable 12.5 Gram(s) IV Push once  dextrose 50% Injectable 25 Gram(s) IV Push once  fluticasone propionate 50 MICROgram(s)/spray Nasal Spray 1 Spray(s) Both Nostrils two times a day  furosemide   Injectable 40 milliGRAM(s) IV Push <User Schedule>  glucagon  Injectable 1 milliGRAM(s) IntraMuscular once  heparin   Injectable 5000 Unit(s) SubCutaneous every 12 hours  insulin lispro (ADMELOG) corrective regimen sliding scale   SubCutaneous three times a day before meals  sacubitril 97 mG/valsartan 103 mG 1 Tablet(s) Oral two times a day    MEDICATIONS  (PRN):  acetaminophen     Tablet .. 650 milliGRAM(s) Oral every 6 hours PRN Temp greater or equal to 38C (100.4F), Mild Pain (1 - 3)  aluminum hydroxide/magnesium hydroxide/simethicone Suspension 30 milliLiter(s) Oral every 4 hours PRN Dyspepsia  dextrose Oral Gel 15 Gram(s) Oral once PRN Blood Glucose LESS THAN 70 milliGRAM(s)/deciliter  guaiFENesin Oral Liquid (Sugar-Free) 200 milliGRAM(s) Oral every 6 hours PRN Cough  melatonin 3 milliGRAM(s) Oral at bedtime PRN Insomnia      Allergies    lisinopril (Other)    Intolerances        SUBJECTIVE: in bed in NAD, no acute events overnight     Vital Signs Last 24 Hrs  T(C): 36.6 (2025 05:07), Max: 37.3 (2025 15:32)  T(F): 97.9 (2025 05:07), Max: 99.2 (2025 15:32)  HR: 82 (2025 11:51) (77 - 95)  BP: 142/93 (2025 05:07) (139/87 - 152/90)  BP(mean): --  RR: 20 (2025 05:07) (18 - 20)  SpO2: 95% (2025 11:51) (95% - 100%)    Parameters below as of 2025 11:51  Patient On (Oxygen Delivery Method): nasal cannula, 2L      PHYSICAL EXAM:  GENERAL: NAD, well-groomed, well-developed  HEAD:  Atraumatic, Normocephalic  EYES: EOMI, PERRLA, conjunctiva and sclera clear  ENMT: No tonsillar erythema, exudates, or enlargement; Moist mucous membranes, Good dentition, No lesions  NECK: Supple,  CHEST/LUNG: improved air entry    HEART: Regular rate and rhythm; No murmurs, rubs, or gallops  ABDOMEN: Soft, Nontender, Nondistended; Bowel sounds present  EXTREMITIES:  2+ Peripheral Pulses, No clubbing, cyanosis,+ edema BL LE  SKIN: No rashes or lesions  NERVOUS SYSTEM:  Alert & Oriented X3, Good concentration; Motor Strength 5/5 B/L upper and lower extremities;   DTRs 2+ intact and symmetric, sensation intact BL    LABS:                                        10.4   5.45  )-----------( 176      ( 2025 07:25 )             31.1   -    141  |  107  |  34[H]  ----------------------------<  217[H]  4.1   |  26  |  2.20[H]    Ca    8.6      2025 07:25  Phos  3.9       Mg     2.1         TPro  7.8  /  Alb  3.1[L]  /  TBili  0.5  /  DBili  0.1  /  AST  13[L]  /  ALT  19  /  AlkPhos  111            Urinalysis Basic - ( 2025 09:30 )    Color: Yellow / Appearance: Clear / S.018 / pH: x  Gluc: x / Ketone: Negative mg/dL  / Bili: Negative / Urobili: 1.0 mg/dL   Blood: x / Protein: 30 mg/dL / Nitrite: Positive   Leuk Esterase: Negative / RBC: 2 /HPF / WBC 2 /HPF   Sq Epi: x / Non Sq Epi: x / Bacteria: Moderate /HPF      Cultures;   CAPILLARY BLOOD GLUCOSE        Lipid panel:           RADIOLOGY & ADDITIONAL TESTS:  < from: Xray Chest 1 View- PORTABLE-Urgent (25 @ 17:07) >    IMPRESSION: Mild CHF somewhat increased prior.      < end of copied text >  < from: TTE Echo Complete w/o Contrast w/ Doppler (24 @ 10:17) >  ___________________________________________________________________________  ____________    CONCLUSIONS:     1. Left ventricular cavity is mildly dilated. Left ventricular systolic   function is mildly decreased with an ejection fraction visually estimated   at 45 to 50 %.   2. Severe left ventricular hypertrophy.   3. There is severe (grade 3) left ventricular diastolic dysfunction.   4. Normal right ventricular cavity size and mildly increased wall   thickness,.   5. Left atrium is severely dilated.   6. Mildmitral valve leaflet calcification.   7. Mild mitral valve stenosis.   8. Mild to moderate mitral regurgitation.   9. Moderate tricuspid regurgitation.  10. Moderate aortic regurgitation.  11. Moderate pulmonic regurgitation.  12. Small pericardial effusion with no evidence of hemodynamic compromise   (or echocardiographic evidence of cardiac tamponade). There is some RA   collapse, but this lacks specificity and not diagnostic in itself.  13. Estimated pulmonary artery systolic pressure is 44 mmHg.    < end of copied text >  < from: TTE Echo Complete w/o Contrast w/ Doppler (24 @ 10:17) >  ___________________________________________________________________________  ____________    Imaging Personally Reviewed:  [ x] YES      Consultant(s) Notes Reviewed:  [x ] YES     Care Discussed with [x ] Consultants [X ] Patient [x ] Family  [x ]    [x ]  Other; RN

## 2025-01-19 NOTE — DISCHARGE NOTE PROVIDER - CARE PROVIDER_API CALL
Anabell Bagley  [facility]  [address] [place] [id number]  Phone: [phone]  Fax: [phone]  Follow Up Time:     follow up with your primary care doctor and cardiologist,   Phone: (   )    -  Fax: (   )    -  Follow Up Time:

## 2025-01-19 NOTE — PROGRESS NOTE ADULT - PROBLEM SELECTOR PLAN 1
- per my colleague's documentation "P/w shortness of breath and dyspnea on exertion  - CXR = Mild CHF somewhat increased prior  - Pro-BNP = 8736  - Trop 57.9  - S/p IV Lasix 40mg x2 in the ED  - Initially required Bipap -> Now on room air  - Start IV Lasix 40mg BID  - C/w home Entresto, carvedilol  - Admit to telemetry observation  - Fluid restriction 1.2L/day. Pt educated on importance of fluid restriction  - Strict I&O, daily weight"  acute on chonic combined CHF- continue with diuretics IV, fluid restriction , check ct chest to rule out pna/ vs effusion  1/18/2025 pt refuse ct scan due   to claustrophobia despite education and offering meds she still refused .  1/19/2025 last night had 12 beats nsvt- has ef 40%  and combined systolic and diastolic chf- will consult cardiology    electrolytes are wnl - per my colleague's documentation "P/w shortness of breath and dyspnea on exertion  - CXR = Mild CHF- somewhat increased prior  - Pro-BNP = 8736  - Trop 57.9  - S/p IV Lasix 40mg x2 in the ED  - Initially required Bipap -> Now on room air  - Start IV Lasix 40mg BID  - C/w home Entresto, carvedilol  - Admit to telemetry observation  - Fluid restriction 1.2L/day. Pt educated on importance of fluid restriction  - Strict I&O, daily weight"  acute on chonic combined CHF- continue with diuretics IV, fluid restriction , check ct chest to rule out pna/ vs effusion  1/18/2025 pt refuse ct scan due   to claustrophobia despite education and offering meds she still refused .  1/19/2025 last night had 12 beats nsvt- has ef 40%  and combined systolic and diastolic chf- will consult cardiology    electrolytes are wnl. cardiology reccs noted to change lasix to bumex and add aldactone. will refer to dr. lorene lopez chf-

## 2025-01-19 NOTE — DISCHARGE NOTE PROVIDER - NSDCCPCAREPLAN_GEN_ALL_CORE_FT
PRINCIPAL DISCHARGE DIAGNOSIS  Diagnosis: Acute on chronic combined systolic and diastolic congestive heart failure  Assessment and Plan of Treatment:       SECONDARY DISCHARGE DIAGNOSES  Diagnosis: DM (diabetes mellitus)  Assessment and Plan of Treatment:     Diagnosis: HTN (hypertension)  Assessment and Plan of Treatment:     Diagnosis: HLD (hyperlipidemia)  Assessment and Plan of Treatment:     Diagnosis: 2019 novel coronavirus disease (COVID-19)  Assessment and Plan of Treatment:     Diagnosis: Stage 3 chronic kidney disease  Assessment and Plan of Treatment:     Diagnosis: NSVT (nonsustained ventricular tachycardia)  Assessment and Plan of Treatment:      PRINCIPAL DISCHARGE DIAGNOSIS  Diagnosis: Acute on chronic combined systolic and diastolic congestive heart failure  Assessment and Plan of Treatment: Assessment and Plan:   · Assessment	  Patient is a 49F, with PMHx of HFrEF (EF 45-50%), HTN, CKD, DM T2 presented with 1 day of shortness of breath. Admitted for acute on chronic HFrEF.   Problem/Plan - 1:  ·  Problem: Acute on chronic HFrEF (heart failure with reduced ejection fraction).   ·  Plan: - per my colleague's documentation "P/w shortness of breath and dyspnea on exertion  - CXR = Mild CHF- somewhat increased prior  - Pro-BNP = 8736  - Trop 57.9  - S/p IV Lasix 40mg x2 in the ED  - Initially required Bipap -> Now on room air  - Start IV Lasix 40mg BID  - C/w home Entresto, carvedilol  - Admit to telemetry observation  - Fluid restriction 1.2L/day. Pt educated on importance of fluid restriction  - Strict I&O, daily weight"  acute on chonic combined CHF- continue with diuretics IV, fluid restriction , check ct chest to rule out pna/ vs effusion  1/18/2025 pt refuse ct scan due   to claustrophobia despite education and offering meds she still refused .  1/19/2025 last night had 12 beats nsvt- has ef 40%  and combined systolic and diastolic-.- will consult cardiology    electrolytes are wnl. cardiology reccs noted to change lasix to bumex and add aldactone. will refer to dr. lorene lopez Congestive Heart Failure-.   Problem/Plan - 2:  ·  Problem: 2019 novel coronavirus disease (COVID-19).   ·  Plan: - per my colleague's documentation "COVID positive  - Patient asymptomatic.  - Supportive care  - Isolation"  1/17/2025 check room air sat    check d- dimer  acute on chonic combined CHF- continue with diuretics IV, fluid restriction , check ct chest to rule out pna/ vs effusion  1/18/2025 pt refuse ct scan due   to claustrophobia despite education and offering meds she still refused     Problem/Plan - 3:  ·  Problem: DM (diabetes mellitus).   ·  Plan: - Hold home PO meds  - ISS + FS achs.   Problem/Plan - 4:  ·  Problem: CKD (chronic kidney disease).   ·  Plan: - P/w SCr 1.95, around baseline  - Avoid nephrotoxins.   Problem/Plan - 5:  ·  Problem: HTN (hypertension).   ·  Plan: - C/w home meds.      SECONDARY DISCHARGE DIAGNOSES  Diagnosis: DM (diabetes mellitus)  Assessment and Plan of Treatment:     Diagnosis: HTN (hypertension)  Assessment and Plan of Treatment:     Diagnosis: HLD (hyperlipidemia)  Assessment and Plan of Treatment:     Diagnosis: 2019 novel coronavirus disease (COVID-19)  Assessment and Plan of Treatment:     Diagnosis: Stage 3 chronic kidney disease  Assessment and Plan of Treatment:     Diagnosis: NSVT (nonsustained ventricular tachycardia)  Assessment and Plan of Treatment:

## 2025-01-19 NOTE — DISCHARGE NOTE NURSING/CASE MANAGEMENT/SOCIAL WORK - NSDCPEFALRISK_GEN_ALL_CORE
For information on Fall & Injury Prevention, visit: [email or web address] OR  [email or web address] OR  https://www.cdc.gov/steadi/patient.html

## 2025-01-19 NOTE — DISCHARGE NOTE NURSING/CASE MANAGEMENT/SOCIAL WORK - FINANCIAL ASSISTANCE
[facility] provides services at a reduced cost to those who are determined to be eligible through [facility]’s financial assistance program. Information regarding [facility]’s financial assistance program can be found by going to [email or web address] or by calling [phone].

## 2025-01-19 NOTE — CONSULT NOTE ADULT - SUBJECTIVE AND OBJECTIVE BOX
Cardiology Initial Consult    [facility] - Cardiology at [place]  [address] [place] [address], [place] [address]  Office: [phone]  Fax: [phone]    CHIEF COMPLAINT:  SOB    HISTORY OF PRESENT ILLNESS:  49F HTN, DM, CKD, HFmrEF EF 45% who presented with acute on chronic CHF exacerbation. Previously admitted 1/10/25-1/14/25 for same.      Noted to have 12 beats NSVT on tele    Feels much better after diuresis      Allergies    lisinopril (Other)    Intolerances    	    MEDICATIONS:  amLODIPine   Tablet 10 milliGRAM(s) Oral daily  aspirin enteric coated 81 milliGRAM(s) Oral daily  carvedilol 25 milliGRAM(s) Oral every 12 hours  furosemide   Injectable 40 milliGRAM(s) IV Push <User Schedule>  heparin   Injectable 5000 Unit(s) SubCutaneous every 12 hours  sacubitril 97 mG/valsartan 103 mG 1 Tablet(s) Oral two times a day  albuterol/ipratropium for Nebulization 3 milliLiter(s) Nebulizer every 6 hours  guaiFENesin Oral Liquid (Sugar-Free) 200 milliGRAM(s) Oral every 6 hours PRN  acetaminophen     Tablet .. 650 milliGRAM(s) Oral every 6 hours PRN  melatonin 3 milliGRAM(s) Oral at bedtime PRN  aluminum hydroxide/magnesium hydroxide/simethicone Suspension 30 milliLiter(s) Oral every 4 hours PRN  dexAMETHasone     Tablet 6 milliGRAM(s) Oral daily  dextrose 50% Injectable 25 Gram(s) IV Push once  dextrose 50% Injectable 12.5 Gram(s) IV Push once  dextrose 50% Injectable 25 Gram(s) IV Push once  dextrose Oral Gel 15 Gram(s) Oral once PRN  glucagon  Injectable 1 milliGRAM(s) IntraMuscular once  insulin lispro (ADMELOG) corrective regimen sliding scale   SubCutaneous three times a day before meals  dextrose 5%. 1000 milliLiter(s) IV Continuous <Continuous>  dextrose 5%. 1000 milliLiter(s) IV Continuous <Continuous>  fluticasone propionate 50 MICROgram(s)/spray Nasal Spray 1 Spray(s) Both Nostrils two times a day    PAST MEDICAL & SURGICAL HISTORY:  Hypertension  Diabetes  Peptic ulcer disease  Chronic systolic congestive heart failure    No significant past surgical history    FAMILY HISTORY:    SOCIAL HISTORY:    [x] Non-smoker  [ ] Smoker  [ ] Alcohol    Review of Systems:  Constitutional: [- ] Fever [- ] Chills [ ] Fatigue [ ] Weight change   HEENT: [ ] Blurred vision [ ] Eye pain [- ] Headache [ ] Runny nose [ ] Sore throat   Respiratory: [ ] Cough [ ] Wheezing [ -] Shortness of breath  Cardiovascular: [- ] Chest Pain [ ] Palpitations [ ] HAINES [ ] PND [ ] Orthopnea  Gastrointestinal: [- ] Abdominal Pain [ ] Diarrhea [ ] Constipation [ ] Hemorrhoids [ ] Nausea [ ] Vomiting  Genitourinary: [ ] Nocturia [- ] Dysuria [ ] Incontinence  Extremities: [- ] Swelling [ ] Joint Pain  Neurologic: [ ] Focal deficit [ ] Paresthesias [ -] Syncope  Skin: [ -] Rash [ ] Ecchymoses [ ] Wounds [ ] Lesions  Psychiatry: [- ] Depression [ ] Suicidal/Homicidal ideation [ ] Anxiety [ ] Sleep disturbances  [x ] 10 point review of systems is otherwise negative except as mentioned above            [ ]Unable to obtain    PHYSICAL EXAM:  T(C): 36.6 (01-19-25 @ 05:07), Max: 37.3 (01-18-25 @ 15:32)  HR: 82 (01-19-25 @ 11:51) (77 - 95)  BP: 142/93 (01-19-25 @ 05:07) (139/87 - 152/90)  RR: 20 (01-19-25 @ 05:07) (18 - 20)  SpO2: 95% (01-19-25 @ 11:51) (95% - 100%)  Wt(kg): --  I&O's Summary      Appearance: No acute distress  HEENT:   mmm  Cardiovascular: Normal S1 S2, no elevated JVP, no murmurs, trace edema  Respiratory: Lungs clear to auscultation, good air movement  Psychiatry: A & O x 3, Mood & affect appropriate  Gastrointestinal:  soft nt  Skin: No rashes, no ecchymoses, no cyanosis	  Neurologic: grossly non-focal  Extremities: Normal range of motion, no clubbing, cyanosis  Vascular: Peripheral pulses palpable bilaterally    LABS:	 	  CBC Full  -  ( 19 Jan 2025 07:25 )  WBC Count : 5.45 K/uL  Hemoglobin : 10.4 g/dL  Hematocrit : 31.1 %  Platelet Count - Automated : 176 K/uL      01-19  141  |  107  |  34[H]  ----------------------------<  217[H]  4.1   |  26  |  2.20[H]    01-18  141  |  107  |  34[H]  ----------------------------<  153[H]  3.9   |  26  |  2.23[H]    Ca    8.6      19 Jan 2025 07:25  Ca    8.1[L]      18 Jan 2025 17:39  Phos  3.9     01-19  Phos  3.9     01-18  Mg     2.1     01-19  Mg     2.2     01-18    TPro  7.8  /  Alb  3.1[L]  /  TBili  0.5  /  DBili  0.1  /  AST  13[L]  /  ALT  19  /  AlkPhos  111  01-19  TPro  7.2  /  Alb  3.1[L]  /  TBili  0.4  /  DBili  0.1  /  AST  20  /  ALT  20  /  AlkPhos  103  01-18      proBNP:   Lipid Profile:   HgA1c:   TSH:     CARDIAC MARKERS:      TELEMETRY: 	  SR, PVC's, NSVT  ECG:  	  RADIOLOGY:  OTHER: 	    PREVIOUS DIAGNOSTIC TESTING:    [x] Echocardiogram: < from: TTE Echo Complete w/o Contrast w/ Doppler (09.16.24 @ 10:17) >   1. Left ventricular cavity is mildly dilated. Left ventricular systolic   function is mildly decreased with an ejection fraction visually estimated   at 45 to 50 %.   2. Severe left ventricular hypertrophy.   3. There is severe (grade 3) left ventricular diastolic dysfunction.   4. Normal right ventricular cavity size and mildly increased wall   thickness,.   5. Left atrium is severely dilated.   6. Mildmitral valve leaflet calcification.   7. Mild mitral valve stenosis.   8. Mild to moderate mitral regurgitation.   9. Moderate tricuspid regurgitation.  10. Moderate aortic regurgitation.  11. Moderate pulmonic regurgitation.  12. Small pericardial effusion with no evidence of hemodynamic compromise   (or echocardiographic evidence of cardiac tamponade). There is some RA   collapse, but this lacks specificity and not diagnostic in itself.  13. Estimated pulmonary artery systolic pressure is 44 mmHg.    < end of copied text >    [ ] Catheterization:  [ ] Stress Test:

## 2025-01-19 NOTE — DISCHARGE NOTE PROVIDER - HOSPITAL COURSE
Assessment and Plan:   · Assessment	  Patient is a 49F, with PMHx of HFrEF (EF 45-50%), HTN, CKD, DM T2 presented with 1 day of shortness of breath. Admitted for acute on chronic HFrEF.       Problem/Plan - 1:  ·  Problem: Acute on chronic HFrEF (heart failure with reduced ejection fraction).   ·  Plan: - per my colleague's documentation "P/w shortness of breath and dyspnea on exertion  - CXR = Mild CHF somewhat increased prior  - Pro-BNP = 8736  - Trop 57.9  - S/p IV Lasix 40mg x2 in the ED  - Initially required Bipap -> Now on room air  - Start IV Lasix 40mg BID  - C/w home Entresto, carvedilol  - Admit to telemetry observation  - Fluid restriction 1.2L/day. Pt educated on importance of fluid restriction  - Strict I&O, daily weight"  acute on chonic combined CHF- continue with diuretics IV, fluid restriction , check ct chest to rule out pna/ vs effusion  1/18/2025 pt refuse ct scan due   to claustrophobia despite education and offering meds she still refused .  1/19/2025 last night had 12 beats nsvt- has ef 40%  and combined systolic and diastolic chf- will consult cardiology    electrolytes are wnl.     Problem/Plan - 2:  ·  Problem: 2019 novel coronavirus disease (COVID-19).   ·  Plan: - per my colleague's documentation "COVID positive  - Patient asymptomatic.  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   d- dimer elevated and now acute resp failure- with hypoxia - will check inflammatory markers start.     Problem/Plan - 3:  ·  Problem: DM (diabetes mellitus).   ·  Plan: - Hold home PO meds  - ISS + FS achs.     Problem/Plan - 4:  ·  Problem: CKD (chronic kidney disease).   ·  Plan: - P/w SCr 1.95, around baseline  - Avoid nephrotoxins.     Problem/Plan - 5:  ·  Problem: HTN (hypertension).   ·  Plan: - C/w home meds.     Problem/Plan - 6:  ·  Problem: HLD (hyperlipidemia).   ·  Plan: - Not on home med.     Problem/Plan - 7:  ·  Problem: Prophylactic measure.   ·  Plan: - Hep SC.

## 2025-01-19 NOTE — CHART NOTE - NSCHARTNOTEFT_GEN_A_CORE
Patient is a 49y old  Female who presents with a chief complaint of Acute on chronic HFrEF (18 Jan 2025 16:22)    Called by RN to evaluate pt. who had 12 beats NSVT patient was asymptomatic /87 HR 80  Patient is a 49F, with PMHx of HFrEF (EF 45-50%), HTN, CKD, DM T2 presented with 1 day of shortness of breath. She says she is compliant on her Furosemide, but at the same time, She says that she "drinks plenty of water at home". Patient denies headache, fever, chills, nausea, vomit, chest pain, cough, lightheadedness, abdominal pain, diarrhea, constipation, dark/bloody stool, hematuria, loss of strength, or loss of sensation. She says that she "drinks plenty of water at home".    Pt. seen and evaluated at bedside denies chest pain. SOB , lightheadedness, dizziness   Pt. is on furosemide 40mg IVP BID and Entresto     Patient labs earlier   01-18  Potassium 3.3  Phos 3.9  Magnesium 2.2 Creatinine 2.27  Patient potassium was replaced with Kdur 20meq PO x1  Repeat Potassium 3.9     Vital Signs Last 24 Hrs  T(C): 36.7 (18 Jan 2025 23:28), Max: 37.3 (18 Jan 2025 15:32)  T(F): 98.1 (18 Jan 2025 23:28), Max: 99.2 (18 Jan 2025 15:32)  HR: 90 (18 Jan 2025 23:28) (79 - 95)  BP: 152/90 (18 Jan 2025 23:28) (134/85 - 152/90)  BP(mean): --  RR: 19 (18 Jan 2025 23:28) (12 - 20)  SpO2: 95% (18 Jan 2025 23:28) (95% - 100%)    Parameters below as of 18 Jan 2025 23:28  Patient On (Oxygen Delivery Method): nasal cannula  O2 Flow (L/min): 3        PT/INR - ( 18 Jan 2025 17:39 )   PT: 12.6 sec;   INR: 1.12 ratio                                 10.0   5.63  )-----------( 159      ( 18 Jan 2025 06:55 )             29.6     01-18    141  |  107  |  34[H]  ----------------------------<  153[H]  3.9   |  26  |  2.23[H]    Ca    8.1[L]      18 Jan 2025 17:39  Phos  3.9     01-18  Mg     2.2     01-18    TPro  7.2  /  Alb  3.1[L]  /  TBili  0.4  /  DBili  0.1  /  AST  20  /  ALT  20  /  AlkPhos  103  01-18            Assessment:   Called by RN to evaluate pt. who had 12 beats NSVT patient was asymptomatic /87 HR 80  Patient is a 49F, with PMHx of HFrEF (EF 45-50%), HTN, CKD, DM T2 presented with 1 day of shortness of breath. She says she is compliant on her Furosemide, but at the same time, She says that she "drinks plenty of water at home". Pt. now being evaluated for 12beats NSVT            Plan:  -Will draw stat labs at 3am   - Continue current treatment   -Continue with Coreg/ lasix /Entresto  -Follow-up with attending

## 2025-01-19 NOTE — DISCHARGE NOTE PROVIDER - NSDCMRMEDTOKEN_GEN_ALL_CORE_FT
Albuterol (Eqv-ProAir HFA) 90 mcg/inh inhalation aerosol: 2 puff(s) inhaled every 6 hours as needed for  shortness of breath and/or wheezing  amLODIPine 10 mg oral tablet: 1 tab(s) orally once a day  aspirin 81 mg oral delayed release tablet: 1 tab(s) orally once a day  bumetanide 2 mg oral tablet: 1 tab(s) orally once a day  Coreg 25 mg oral tablet: 1 tab(s) orally 2 times a day  Entresto 97 mg-103 mg oral tablet: 1 tab(s) orally 2 times a day  fluticasone 50 mcg/inh nasal spray: 1 spray(s) nasal 2 times a day  ipratropium-albuterol 0.5 mg-2.5 mg/3 mL inhalation solution: 3 milliliter(s) by nebulizer every 6 hours as needed for  shortness of breath and/or wheezing  Jardiance 25 mg oral tablet: 1 tab(s) orally once a day  nebulizer machine diagnosis acute on chronic resp failure, covid pna, chf: use nebulizer machine q6 prn for sob  Tradjenta 5 mg oral tablet: 1 tab(s) orally once a day

## 2025-01-22 DIAGNOSIS — E11.65 TYPE 2 DIABETES MELLITUS WITH HYPERGLYCEMIA: ICD-10-CM

## 2025-01-22 DIAGNOSIS — J96.01 ACUTE RESPIRATORY FAILURE WITH HYPOXIA: ICD-10-CM

## 2025-01-22 DIAGNOSIS — K27.9 PEPTIC ULCER, SITE UNSPECIFIED, UNSPECIFIED AS ACUTE OR CHRONIC, WITHOUT HEMORRHAGE OR PERFORATION: ICD-10-CM

## 2025-01-22 DIAGNOSIS — R06.02 SHORTNESS OF BREATH: ICD-10-CM

## 2025-01-22 DIAGNOSIS — I50.43 ACUTE ON CHRONIC COMBINED SYSTOLIC (CONGESTIVE) AND DIASTOLIC (CONGESTIVE) HEART FAILURE: ICD-10-CM

## 2025-01-22 DIAGNOSIS — I24.89 OTHER FORMS OF ACUTE ISCHEMIC HEART DISEASE: ICD-10-CM

## 2025-01-22 DIAGNOSIS — E11.22 TYPE 2 DIABETES MELLITUS WITH DIABETIC CHRONIC KIDNEY DISEASE: ICD-10-CM

## 2025-01-22 DIAGNOSIS — R00.0 TACHYCARDIA, UNSPECIFIED: ICD-10-CM

## 2025-01-22 DIAGNOSIS — Z87.891 PERSONAL HISTORY OF NICOTINE DEPENDENCE: ICD-10-CM

## 2025-01-22 DIAGNOSIS — E87.6 HYPOKALEMIA: ICD-10-CM

## 2025-01-23 PROBLEM — Z00.00 ENCOUNTER FOR PREVENTIVE HEALTH EXAMINATION: Status: ACTIVE | Noted: 2025-01-23

## 2025-01-24 ENCOUNTER — INPATIENT (INPATIENT)
Facility: HOSPITAL | Age: 50
LOS: 4 days | Discharge: HOME HEALTH | End: 2025-01-29
Attending: INTERNAL MEDICINE | Admitting: INTERNAL MEDICINE
Payer: MEDICAID

## 2025-01-24 VITALS
SYSTOLIC BLOOD PRESSURE: 148 MMHG | TEMPERATURE: 98 F | WEIGHT: 227.08 LBS | OXYGEN SATURATION: 92 % | HEIGHT: 68 IN | RESPIRATION RATE: 18 BRPM | HEART RATE: 109 BPM | DIASTOLIC BLOOD PRESSURE: 123 MMHG

## 2025-01-24 DIAGNOSIS — U07.1 COVID-19: ICD-10-CM

## 2025-01-24 DIAGNOSIS — Z88.8 ALLERGY STATUS TO OTHER DRUGS, MEDICAMENTS AND BIOLOGICAL SUBSTANCES: ICD-10-CM

## 2025-01-24 DIAGNOSIS — Z79.84 LONG TERM (CURRENT) USE OF ORAL HYPOGLYCEMIC DRUGS: ICD-10-CM

## 2025-01-24 DIAGNOSIS — E78.5 HYPERLIPIDEMIA, UNSPECIFIED: ICD-10-CM

## 2025-01-24 DIAGNOSIS — I50.43 ACUTE ON CHRONIC COMBINED SYSTOLIC (CONGESTIVE) AND DIASTOLIC (CONGESTIVE) HEART FAILURE: ICD-10-CM

## 2025-01-24 DIAGNOSIS — R09.89 OTHER SPECIFIED SYMPTOMS AND SIGNS INVOLVING THE CIRCULATORY AND RESPIRATORY SYSTEMS: ICD-10-CM

## 2025-01-24 DIAGNOSIS — F40.240 CLAUSTROPHOBIA: ICD-10-CM

## 2025-01-24 DIAGNOSIS — J96.01 ACUTE RESPIRATORY FAILURE WITH HYPOXIA: ICD-10-CM

## 2025-01-24 DIAGNOSIS — Z87.891 PERSONAL HISTORY OF NICOTINE DEPENDENCE: ICD-10-CM

## 2025-01-24 DIAGNOSIS — N18.30 CHRONIC KIDNEY DISEASE, STAGE 3 UNSPECIFIED: ICD-10-CM

## 2025-01-24 DIAGNOSIS — Z91.198 PATIENT'S NONCOMPLIANCE WITH OTHER MEDICAL TREATMENT AND REGIMEN FOR OTHER REASON: ICD-10-CM

## 2025-01-24 DIAGNOSIS — Z79.82 LONG TERM (CURRENT) USE OF ASPIRIN: ICD-10-CM

## 2025-01-24 DIAGNOSIS — Z79.899 OTHER LONG TERM (CURRENT) DRUG THERAPY: ICD-10-CM

## 2025-01-24 DIAGNOSIS — E11.22 TYPE 2 DIABETES MELLITUS WITH DIABETIC CHRONIC KIDNEY DISEASE: ICD-10-CM

## 2025-01-24 DIAGNOSIS — I47.29 OTHER VENTRICULAR TACHYCARDIA: ICD-10-CM

## 2025-01-24 LAB
A1C WITH ESTIMATED AVERAGE GLUCOSE RESULT: 8.1 % — HIGH (ref 4–5.6)
ALBUMIN SERPL ELPH-MCNC: 3 G/DL — LOW (ref 3.3–5)
ALP SERPL-CCNC: 109 U/L — SIGNIFICANT CHANGE UP (ref 40–120)
ALT FLD-CCNC: 36 U/L — SIGNIFICANT CHANGE UP (ref 12–78)
ANION GAP SERPL CALC-SCNC: 2 MMOL/L — LOW (ref 5–17)
ANION GAP SERPL CALC-SCNC: 3 MMOL/L — LOW (ref 5–17)
AST SERPL-CCNC: 16 U/L — SIGNIFICANT CHANGE UP (ref 15–37)
BASE EXCESS BLDA CALC-SCNC: 2.4 MMOL/L — SIGNIFICANT CHANGE UP (ref -2–3)
BASOPHILS # BLD AUTO: 0.03 K/UL — SIGNIFICANT CHANGE UP (ref 0–0.2)
BASOPHILS NFR BLD AUTO: 0.2 % — SIGNIFICANT CHANGE UP (ref 0–2)
BILIRUB SERPL-MCNC: 0.6 MG/DL — SIGNIFICANT CHANGE UP (ref 0.2–1.2)
BLOOD GAS COMMENTS ARTERIAL: SIGNIFICANT CHANGE UP
BUN SERPL-MCNC: 31 MG/DL — HIGH (ref 7–23)
BUN SERPL-MCNC: 33 MG/DL — HIGH (ref 7–23)
CALCIUM SERPL-MCNC: 8.8 MG/DL — SIGNIFICANT CHANGE UP (ref 8.5–10.1)
CALCIUM SERPL-MCNC: 8.9 MG/DL — SIGNIFICANT CHANGE UP (ref 8.5–10.1)
CHLORIDE SERPL-SCNC: 109 MMOL/L — HIGH (ref 96–108)
CHLORIDE SERPL-SCNC: 109 MMOL/L — HIGH (ref 96–108)
CO2 BLDA-SCNC: 30 MMOL/L — HIGH (ref 19–24)
CO2 SERPL-SCNC: 31 MMOL/L — SIGNIFICANT CHANGE UP (ref 22–31)
CO2 SERPL-SCNC: 32 MMOL/L — HIGH (ref 22–31)
CREAT SERPL-MCNC: 1.84 MG/DL — HIGH (ref 0.5–1.3)
CREAT SERPL-MCNC: 1.9 MG/DL — HIGH (ref 0.5–1.3)
EGFR: 32 ML/MIN/1.73M2 — LOW
EGFR: 33 ML/MIN/1.73M2 — LOW
EOSINOPHIL # BLD AUTO: 0.11 K/UL — SIGNIFICANT CHANGE UP (ref 0–0.5)
EOSINOPHIL NFR BLD AUTO: 0.8 % — SIGNIFICANT CHANGE UP (ref 0–6)
ESTIMATED AVERAGE GLUCOSE: 186 MG/DL — HIGH (ref 68–114)
FLUAV AG NPH QL: SIGNIFICANT CHANGE UP
FLUBV AG NPH QL: SIGNIFICANT CHANGE UP
GAS PNL BLDA: SIGNIFICANT CHANGE UP
GLUCOSE SERPL-MCNC: 144 MG/DL — HIGH (ref 70–99)
GLUCOSE SERPL-MCNC: 269 MG/DL — HIGH (ref 70–99)
HCG SERPL-ACNC: 2 MIU/ML — SIGNIFICANT CHANGE UP
HCO3 BLDA-SCNC: 29 MMOL/L — HIGH (ref 21–28)
HCT VFR BLD CALC: 31.8 % — LOW (ref 34.5–45)
HGB BLD-MCNC: 10.6 G/DL — LOW (ref 11.5–15.5)
HOROWITZ INDEX BLDA+IHG-RTO: 40 — SIGNIFICANT CHANGE UP
IMM GRANULOCYTES NFR BLD AUTO: 0.5 % — SIGNIFICANT CHANGE UP (ref 0–0.9)
LYMPHOCYTES # BLD AUTO: 1.66 K/UL — SIGNIFICANT CHANGE UP (ref 1–3.3)
LYMPHOCYTES # BLD AUTO: 12.1 % — LOW (ref 13–44)
MAGNESIUM SERPL-MCNC: 2.1 MG/DL — SIGNIFICANT CHANGE UP (ref 1.6–2.6)
MAGNESIUM SERPL-MCNC: 2.1 MG/DL — SIGNIFICANT CHANGE UP (ref 1.6–2.6)
MCHC RBC-ENTMCNC: 25.3 PG — LOW (ref 27–34)
MCHC RBC-ENTMCNC: 33.3 G/DL — SIGNIFICANT CHANGE UP (ref 32–36)
MCV RBC AUTO: 75.9 FL — LOW (ref 80–100)
MONOCYTES # BLD AUTO: 0.43 K/UL — SIGNIFICANT CHANGE UP (ref 0–0.9)
MONOCYTES NFR BLD AUTO: 3.1 % — SIGNIFICANT CHANGE UP (ref 2–14)
NEUTROPHILS # BLD AUTO: 11.38 K/UL — HIGH (ref 1.8–7.4)
NEUTROPHILS NFR BLD AUTO: 83.3 % — HIGH (ref 43–77)
NRBC # BLD: 0 /100 WBCS — SIGNIFICANT CHANGE UP (ref 0–0)
NRBC BLD-RTO: 0 /100 WBCS — SIGNIFICANT CHANGE UP (ref 0–0)
NT-PROBNP SERPL-SCNC: HIGH PG/ML (ref 0–125)
PCO2 BLDA: 51 MMHG — HIGH (ref 32–46)
PH BLDA: 7.36 — SIGNIFICANT CHANGE UP (ref 7.35–7.45)
PLATELET # BLD AUTO: 212 K/UL — SIGNIFICANT CHANGE UP (ref 150–400)
PO2 BLDA: 69 MMHG — LOW (ref 83–108)
POTASSIUM SERPL-MCNC: 4.5 MMOL/L — SIGNIFICANT CHANGE UP (ref 3.5–5.3)
POTASSIUM SERPL-MCNC: 4.7 MMOL/L — SIGNIFICANT CHANGE UP (ref 3.5–5.3)
POTASSIUM SERPL-SCNC: 4.5 MMOL/L — SIGNIFICANT CHANGE UP (ref 3.5–5.3)
POTASSIUM SERPL-SCNC: 4.7 MMOL/L — SIGNIFICANT CHANGE UP (ref 3.5–5.3)
PROT SERPL-MCNC: 7.6 GM/DL — SIGNIFICANT CHANGE UP (ref 6–8.3)
RBC # BLD: 4.19 M/UL — SIGNIFICANT CHANGE UP (ref 3.8–5.2)
RBC # FLD: 15.9 % — HIGH (ref 10.3–14.5)
RSV RNA NPH QL NAA+NON-PROBE: SIGNIFICANT CHANGE UP
SAO2 % BLDA: 95.5 % — SIGNIFICANT CHANGE UP (ref 94–98)
SARS-COV-2 RNA SPEC QL NAA+PROBE: DETECTED
SODIUM SERPL-SCNC: 142 MMOL/L — SIGNIFICANT CHANGE UP (ref 135–145)
SODIUM SERPL-SCNC: 144 MMOL/L — SIGNIFICANT CHANGE UP (ref 135–145)
TROPONIN I, HIGH SENSITIVITY RESULT: 55.2 NG/L — HIGH
WBC # BLD: 13.68 K/UL — HIGH (ref 3.8–10.5)
WBC # FLD AUTO: 13.68 K/UL — HIGH (ref 3.8–10.5)

## 2025-01-24 PROCEDURE — 93010 ELECTROCARDIOGRAM REPORT: CPT

## 2025-01-24 PROCEDURE — 99223 1ST HOSP IP/OBS HIGH 75: CPT

## 2025-01-24 PROCEDURE — 99291 CRITICAL CARE FIRST HOUR: CPT | Mod: 25

## 2025-01-24 PROCEDURE — 71045 X-RAY EXAM CHEST 1 VIEW: CPT | Mod: 26,77

## 2025-01-24 PROCEDURE — 71045 X-RAY EXAM CHEST 1 VIEW: CPT | Mod: 26

## 2025-01-24 RX ORDER — CARVEDILOL 6.25 MG
25 TABLET ORAL EVERY 12 HOURS
Refills: 0 | Status: DISCONTINUED | OUTPATIENT
Start: 2025-01-24 | End: 2025-01-29

## 2025-01-24 RX ORDER — ACETAMINOPHEN 160 MG/5ML
650 SUSPENSION ORAL EVERY 6 HOURS
Refills: 0 | Status: DISCONTINUED | OUTPATIENT
Start: 2025-01-24 | End: 2025-01-29

## 2025-01-24 RX ORDER — SACUBITRIL AND VALSARTAN 49; 51 MG/1; MG/1
1 TABLET, FILM COATED ORAL
Refills: 0 | Status: DISCONTINUED | OUTPATIENT
Start: 2025-01-24 | End: 2025-01-29

## 2025-01-24 RX ORDER — AMLODIPINE BESYLATE 5 MG
10 TABLET ORAL DAILY
Refills: 0 | Status: DISCONTINUED | OUTPATIENT
Start: 2025-01-24 | End: 2025-01-29

## 2025-01-24 RX ORDER — MAGNESIUM, ALUMINUM HYDROXIDE 200-225/5
30 SUSPENSION, ORAL (FINAL DOSE FORM) ORAL EVERY 4 HOURS
Refills: 0 | Status: DISCONTINUED | OUTPATIENT
Start: 2025-01-24 | End: 2025-01-29

## 2025-01-24 RX ORDER — ACETAMINOPHEN, DIPHENHYDRAMINE HCL, PHENYLEPHRINE HCL 325; 25; 5 MG/1; MG/1; MG/1
3 TABLET ORAL AT BEDTIME
Refills: 0 | Status: DISCONTINUED | OUTPATIENT
Start: 2025-01-24 | End: 2025-01-29

## 2025-01-24 RX ORDER — ASPIRIN 81 MG/1
81 TABLET, COATED ORAL DAILY
Refills: 0 | Status: DISCONTINUED | OUTPATIENT
Start: 2025-01-24 | End: 2025-01-29

## 2025-01-24 RX ORDER — FAMOTIDINE 10 MG/ML
20 INJECTION INTRAVENOUS ONCE
Refills: 0 | Status: COMPLETED | OUTPATIENT
Start: 2025-01-24 | End: 2025-01-24

## 2025-01-24 RX ORDER — ONDANSETRON 4 MG/1
4 TABLET, ORALLY DISINTEGRATING ORAL EVERY 8 HOURS
Refills: 0 | Status: DISCONTINUED | OUTPATIENT
Start: 2025-01-24 | End: 2025-01-29

## 2025-01-24 RX ORDER — HEPARIN SODIUM,PORCINE 10000/ML
5000 VIAL (ML) INJECTION EVERY 8 HOURS
Refills: 0 | Status: DISCONTINUED | OUTPATIENT
Start: 2025-01-24 | End: 2025-01-29

## 2025-01-24 RX ADMIN — Medication 80 MILLIGRAM(S): at 11:50

## 2025-01-24 RX ADMIN — Medication 40 MILLIGRAM(S): at 06:54

## 2025-01-24 RX ADMIN — SACUBITRIL AND VALSARTAN 1 TABLET(S): 49; 51 TABLET, FILM COATED ORAL at 19:01

## 2025-01-24 RX ADMIN — Medication 25 MILLIGRAM(S): at 19:01

## 2025-01-24 RX ADMIN — Medication 30 MILLILITER(S): at 15:56

## 2025-01-24 RX ADMIN — FAMOTIDINE 20 MILLIGRAM(S): 10 INJECTION INTRAVENOUS at 15:56

## 2025-01-24 NOTE — ED PROVIDER NOTE - CRITICAL CARE ATTENDING CONTRIBUTION TO CARE
Pt. was critically ill and suffered potentially life threatening medical problems.  I personally consulted other physicians, performed emergent diagnostic procedures and lab work, performed procedures at bedside.

## 2025-01-24 NOTE — ED PROVIDER NOTE - PHYSICAL EXAMINATION
Gen: AAOx3, NAD, sitting uncomfortably in stretcher, speaking in short sentences, +retractions  Head: ncat, perrla, eomi b/l  Neck: supple, no lymphadenopathy, no midline deviation  Heart: rrr, no m/r/g  Lungs: +b/l rales   Abd: soft, nontender, non-distended, no rebound or guarding  Ext: no clubbing/cyanosis/edema  Neuro: sensation and muscle strength intact b/l, steady gait

## 2025-01-24 NOTE — ED PROVIDER NOTE - CLINICAL SUMMARY MEDICAL DECISION MAKING FREE TEXT BOX
50 yo F with likely chf exacerbation, sob, hypoxia, wet lungs, doubt pna, covid, acs  -cbc, cmp, abg, flu/covid, hcg, mag, bnp, trop, CXR, ekg, iv, lasix, monitor, bipap, abg,   -f/u results, reeval

## 2025-01-24 NOTE — H&P ADULT - NSHPPHYSICALEXAM_GEN_ALL_CORE
T(C): 36.8 (01-24-25 @ 08:20), Max: 36.8 (01-24-25 @ 05:42)  HR: 85 (01-24-25 @ 08:20) (85 - 109)  BP: 126/81 (01-24-25 @ 08:20) (109/79 - 158/92)  RR: 17 (01-24-25 @ 08:20) (17 - 25)  SpO2: 98% (01-24-25 @ 08:20) (92% - 100%)    CONSTITUTIONAL: Well groomed, no apparent distress  RESP: No respiratory distress, no use of accessory muscles; CTA b/l, no WRR  CV: RRR, +S1S2, no MRG; no JVD; no peripheral edema  GI: Soft, NT, ND, no rebound, no guarding; no palpable masses; no hepatosplenomegaly; no hernia palpated T(C): 36.8 (01-24-25 @ 08:20), Max: 36.8 (01-24-25 @ 05:42)  HR: 85 (01-24-25 @ 08:20) (85 - 109)  BP: 126/81 (01-24-25 @ 08:20) (109/79 - 158/92)  RR: 17 (01-24-25 @ 08:20) (17 - 25)  SpO2: 98% (01-24-25 @ 08:20) (92% - 100%)    CONSTITUTIONAL: Obese comfortable on bipap   RESP: No respiratory distress, no use of accessory muscles; Crackles throughout lung fields   CV: RRR, +S1S2, no MRG; no JVD; no peripheral edema  GI: Soft, NT, ND, no rebound, no guarding; no palpable masses; no hepatosplenomegaly; no hernia palpated

## 2025-01-24 NOTE — ED ADULT NURSE NOTE - ED STAT RN HANDOFF DETAILS 4
report given to fidencio, RN. pt aaox4 resting in stretcher on 2L NC. VSS, NADN att. safety measures in place. isolation precautions in place. all STAT meds given.

## 2025-01-24 NOTE — H&P ADULT - ASSESSMENT
49 years old female with h/o HTN, HLD, CKD, DM, HF ( systolic and diastolic) present to ED with complain of worsening SOB. Admit for Covid19, AE HFrEF     ##Acute respiratory failure with hypoxia.   ##Acute on chronic combined systolic and diastolic congestive heart failure.   Worsening SOB.  On exam, bilateral crackles. Clinically volume overloaded. Echo from 09/16/24 reviewed: Left ventricular cavity is mildly dilated. Left ventricular systolic function is mildly decreased with an ejection fraction visually estimated at 45 to 50 %. Severe left ventricular hypertrophy.There is severe (grade 3) left ventricular diastolic dysfunction.with reduced EF. BNP 12k+.   - C/w IV lasix 80mg bid; Strict I/Os   - GDMT: Entresto , Coreg     # Benign essential HTN.   ·  Plan: monitor BP and titrate BP med.    ##Hyperlipidemia, unspecified.   ·  Plan: continue statin.    ## Type 2 diabetes mellitus with unspecified complications.   - SSI  w/ hypoglycemia protocol     #Chronic kidney disease (CKD).   ·  Cr 1.9 around her BL.    - C/w Diuresis    #Obesity   BMI 32. encourage diet and exercise.     Diet: DASH  DVT prophylaxis: Lovenox   Dispo: Tele   Code Status: FC     I have spent a total of *** minutes to prepare to see the patient, obtaining and reviewing history, physical examination, explaining the diagnosis, prognosis and treatment plan with the patient/family/caregiver. I also have spent the time ordering studies and testing, interpreting results, medicine reconciliation, IDRs, subspecialty consultation and documentation as above. 49 years old female with h/o HTN, HLD, CKD, DM, HF ( systolic and diastolic) present to ED with complain of worsening SOB. Admit for Covid19, AE HFrEF     ##Acute respiratory failure with hypoxia.   ##Acute on chronic combined systolic and diastolic congestive heart failure.   Worsening SOB.  On exam, bilateral crackles. Clinically volume overloaded. Echo from 09/16/24 reviewed: Left ventricular cavity is mildly dilated. Left ventricular systolic function is mildly decreased with an ejection fraction visually estimated at 45 to 50 %. Severe left ventricular hypertrophy.There is severe (grade 3) left ventricular diastolic dysfunction.with reduced EF. BNP 12k+. Troponin slightly elevated. Attempted to get CTPE but patient refused. TTE suggestive of non-obstructive HCM vs. infiltrative cardiomyopathy. She was supposed to follow up with cardiologist for amyloid evaluation. - C/w IV lasix 80mg daily; Strict I/Os. Monitor electrolytes   - Check CXR   - Check V/Q scan to r/o PE   - Repeat Echo   - GDMT: Entresto , Coreg. Restart SGLT2 when off IV Lasix    - Titrate Bipap as tolerated   - Admit to Tele     ##COVID19+   - First positive on the 01/09/25. Suspect patient is shedding virus and thus still positive. Will hold off on remdesivir and Dexamethasone for now.  - Monitor     # Benign essential HTN.   ·  C/w Coreg, Entresto     ##Hyperlipidemia, unspecified.   ·  Not on statin? Will clarify with patient.     ## Type 2 diabetes mellitus with unspecified complications.   - Check A1c   - C/w  SSI  w/ hypoglycemia protocol     #Chronic kidney disease (CKD).   ·  Cr 1.9 around her BL.    - C/w Diuresis  - Avoid nephrotoxic agents     #Obesity   BMI 34.5. Encourage diet and exercise.     Diet: DASH  DVT prophylaxis: SQHeparin  Dispo: Tele   Code Status: FC     I have spent a total of 76 minutes to prepare to see the patient, obtaining and reviewing history, physical examination, explaining the diagnosis, prognosis and treatment plan with the patient/family/caregiver. I also have spent the time ordering studies and testing, interpreting results, medicine reconciliation, IDRs, subspecialty consultation and documentation as above.

## 2025-01-24 NOTE — ED ADULT NURSE REASSESSMENT NOTE - NS ED NURSE REASSESS COMMENT FT1
Safety checks compld + maintd. T+P Q encouraged. IV sites checked Q2+remains WDL. Meds given as ord with no s/s of adverse RXNs. Skin care and complete care rendered, linen changed as pt soiled herself. New primafit applied. Fall +skin precs in place. Call bell within reach and safety measures in place.

## 2025-01-24 NOTE — ED ADULT NURSE REASSESSMENT NOTE - NS ED NURSE REASSESS COMMENT FT1
pt c/o left rib/flank pain new onset. PO X96%-97%on 2LNC. Ate lunch and nabil. JUAN Marquis made aware and awaiting assessment and orders

## 2025-01-24 NOTE — ED ADULT NURSE NOTE - NSFALLUNIVINTERV_ED_ALL_ED
Bed/Stretcher in lowest position, wheels locked, appropriate side rails in place/Call bell, personal items and telephone in reach/Instruct patient to call for assistance before getting out of bed/chair/stretcher/Non-slip footwear applied when patient is off stretcher/[place] to call system/Physically safe environment - no spills, clutter or unnecessary equipment/Purposeful proactive rounding/Room/bathroom lighting operational, light cord in reach

## 2025-01-24 NOTE — ED ADULT NURSE NOTE - OBJECTIVE STATEMENT
pt aaox4, ambulatory independently and with cane. pt with PMH of CHF, DM, CKD, and HTN presents today with SOB and difficulty breathing that started after 0400 this morning while trying to sleep. Pt reports productive cough that started yesterday. As per EMS, pt o2, sat low 50s, on home o2 2L NS, advised to now use Oxygen at night. PT reports she was seen here three times in the last month for the same symptoms. Pt arrived with EMS on CPAP. PT denies CP, nvd, any known illness. Respiratory called to bedside. Dr. Gilmore called to bedside.    hx of CHF, DM, CKD, HTN.

## 2025-01-24 NOTE — ED PROVIDER NOTE - OBJECTIVE STATEMENT
48 yo F with sob since tonight.  Pt. states it feels like her heart failure acting up.  No chest pain, no other complaints.  BIBEMS as a med note with hypoxia, placed on capap with improvement.   ROS: negative for fever, headache, chest pain, abd pain, nausea, vomiting, diarrhea, rash, paresthesia, and weakness--all other systems reviewed are negative.   PMH: HFrEF (EF 45-50%), HTN, CKD, DM T2 ; Meds: See EMR for list; SH: Denies smoking/drinking/drug use

## 2025-01-24 NOTE — ED ADULT NURSE REASSESSMENT NOTE - NS ED NURSE REASSESS COMMENT FT1
Pt refuses to have CT scan done, pt educated about the importance of having CT scan done. JUAN Paz made aware of pt refusal. Pt remains connected to cardiac monitor, safety maintained, assessment ongoing.

## 2025-01-24 NOTE — ED ADULT NURSE REASSESSMENT NOTE - NS ED NURSE REASSESS COMMENT FT1
Pt taken off BIPAP and placed on 3 L NC by respiratpry pt a Pt taken off BIPAP and placed on 3 L NC by respiratory pt able to tolerate being on 02 NC. Pt remains connected to cardiac monitor, safety maintained assessment ongoing.

## 2025-01-24 NOTE — H&P ADULT - HISTORY OF PRESENT ILLNESS
49F, with PMHx of HFrEF (EF 45-50%), HTN, CKD, DM T2 presented with 1 day of shortness of breath.     She was recently admitted to [facility] from 01/16 - 01/19 for AE HF and  49F, with PMHx of HFrEF (EF 45-50%), HTN, CKD, DM T2 presented with 1 day of shortness of breath.     She was recently admitted to [facility] from 01/16 - 01/19 for AE HF and COVID.  49F, with PMHx of HFrEF (EF 45-50%), HTN, CKD, DM T2 presented with 1 day of shortness of breath.     She was recently admitted to [facility] from 01/16 - 01/19 for AE HF. Found to be COVID positive. Diuresed and discharged home. States she never returned to baseline and had worsening SOB overtime. Can been compliant with medications. There was concern for PE due to elevated ddimer but patient refused CTPE.     In the ED,  , /109, RR 18, SPO2 92% CPAP. Placed on CPAP upon arrival. Initial labs significant for BNP 12k+. No imaging done in ED.  Admitted to Tele

## 2025-01-24 NOTE — ED ADULT TRIAGE NOTE - CHIEF COMPLAINT QUOTE
bibems from home for sob tonight.  Per EMS, pt O2 sat low 50's, on home O2 2L NC, was seen here previously on 1/15 for same symptoms.   Pt arrived with CPAP, directed to shaq ORELLANA RT called, MD Gilmore at bedside.   hx of CHF, DM, CKD, HTN.

## 2025-01-24 NOTE — ED ADULT NURSE NOTE - ED STAT RN HANDOFF DETAILS
20-Nov-2019
report received from night shift RN,  pt stable and resting comfortably in bed; demonstrating no s/s of acute distress at this time. provider orders reviewed and addressed appropriately. RN inquired about pt current needs and addressed accordingly. pt safety maintained. awaiting results/additional orders and admission bed placement.

## 2025-01-25 LAB
ALBUMIN SERPL ELPH-MCNC: 2.8 G/DL — LOW (ref 3.3–5)
ALP SERPL-CCNC: 99 U/L — SIGNIFICANT CHANGE UP (ref 40–120)
ALT FLD-CCNC: 24 U/L — SIGNIFICANT CHANGE UP (ref 12–78)
ANION GAP SERPL CALC-SCNC: 2 MMOL/L — LOW (ref 5–17)
AST SERPL-CCNC: 9 U/L — LOW (ref 15–37)
BASOPHILS # BLD AUTO: 0.02 K/UL — SIGNIFICANT CHANGE UP (ref 0–0.2)
BASOPHILS NFR BLD AUTO: 0.2 % — SIGNIFICANT CHANGE UP (ref 0–2)
BILIRUB SERPL-MCNC: 0.4 MG/DL — SIGNIFICANT CHANGE UP (ref 0.2–1.2)
BUN SERPL-MCNC: 36 MG/DL — HIGH (ref 7–23)
CALCIUM SERPL-MCNC: 8.2 MG/DL — LOW (ref 8.5–10.1)
CHLORIDE SERPL-SCNC: 106 MMOL/L — SIGNIFICANT CHANGE UP (ref 96–108)
CO2 SERPL-SCNC: 33 MMOL/L — HIGH (ref 22–31)
CREAT SERPL-MCNC: 2.24 MG/DL — HIGH (ref 0.5–1.3)
EGFR: 26 ML/MIN/1.73M2 — LOW
EOSINOPHIL # BLD AUTO: 0.11 K/UL — SIGNIFICANT CHANGE UP (ref 0–0.5)
EOSINOPHIL NFR BLD AUTO: 1.3 % — SIGNIFICANT CHANGE UP (ref 0–6)
GLUCOSE SERPL-MCNC: 214 MG/DL — HIGH (ref 70–99)
HCT VFR BLD CALC: 29.5 % — LOW (ref 34.5–45)
HGB BLD-MCNC: 9.8 G/DL — LOW (ref 11.5–15.5)
IMM GRANULOCYTES NFR BLD AUTO: 0.4 % — SIGNIFICANT CHANGE UP (ref 0–0.9)
LYMPHOCYTES # BLD AUTO: 2.29 K/UL — SIGNIFICANT CHANGE UP (ref 1–3.3)
LYMPHOCYTES # BLD AUTO: 27.4 % — SIGNIFICANT CHANGE UP (ref 13–44)
MCHC RBC-ENTMCNC: 24.9 PG — LOW (ref 27–34)
MCHC RBC-ENTMCNC: 33.2 G/DL — SIGNIFICANT CHANGE UP (ref 32–36)
MCV RBC AUTO: 75.1 FL — LOW (ref 80–100)
MONOCYTES # BLD AUTO: 0.52 K/UL — SIGNIFICANT CHANGE UP (ref 0–0.9)
MONOCYTES NFR BLD AUTO: 6.2 % — SIGNIFICANT CHANGE UP (ref 2–14)
NEUTROPHILS # BLD AUTO: 5.39 K/UL — SIGNIFICANT CHANGE UP (ref 1.8–7.4)
NEUTROPHILS NFR BLD AUTO: 64.5 % — SIGNIFICANT CHANGE UP (ref 43–77)
NRBC # BLD: 0 /100 WBCS — SIGNIFICANT CHANGE UP (ref 0–0)
NRBC BLD-RTO: 0 /100 WBCS — SIGNIFICANT CHANGE UP (ref 0–0)
PHOSPHATE SERPL-MCNC: 3.2 MG/DL — SIGNIFICANT CHANGE UP (ref 2.5–4.5)
PLATELET # BLD AUTO: 196 K/UL — SIGNIFICANT CHANGE UP (ref 150–400)
POTASSIUM SERPL-MCNC: 3.8 MMOL/L — SIGNIFICANT CHANGE UP (ref 3.5–5.3)
POTASSIUM SERPL-SCNC: 3.8 MMOL/L — SIGNIFICANT CHANGE UP (ref 3.5–5.3)
PROT SERPL-MCNC: 6.9 GM/DL — SIGNIFICANT CHANGE UP (ref 6–8.3)
RBC # BLD: 3.93 M/UL — SIGNIFICANT CHANGE UP (ref 3.8–5.2)
RBC # FLD: 16.1 % — HIGH (ref 10.3–14.5)
SODIUM SERPL-SCNC: 141 MMOL/L — SIGNIFICANT CHANGE UP (ref 135–145)
WBC # BLD: 8.36 K/UL — SIGNIFICANT CHANGE UP (ref 3.8–10.5)
WBC # FLD AUTO: 8.36 K/UL — SIGNIFICANT CHANGE UP (ref 3.8–10.5)

## 2025-01-25 PROCEDURE — 99232 SBSQ HOSP IP/OBS MODERATE 35: CPT

## 2025-01-25 RX ORDER — LINAGLIPTIN 5 MG/1
5 TABLET, FILM COATED ORAL DAILY
Refills: 0 | Status: DISCONTINUED | OUTPATIENT
Start: 2025-01-25 | End: 2025-01-29

## 2025-01-25 RX ADMIN — Medication 80 MILLIGRAM(S): at 15:23

## 2025-01-25 RX ADMIN — Medication 5000 UNIT(S): at 15:23

## 2025-01-25 RX ADMIN — LINAGLIPTIN 5 MILLIGRAM(S): 5 TABLET, FILM COATED ORAL at 11:33

## 2025-01-25 RX ADMIN — Medication 25 MILLIGRAM(S): at 18:15

## 2025-01-25 RX ADMIN — Medication 10 MILLIGRAM(S): at 05:24

## 2025-01-25 RX ADMIN — Medication 80 MILLIGRAM(S): at 05:23

## 2025-01-25 RX ADMIN — SACUBITRIL AND VALSARTAN 1 TABLET(S): 49; 51 TABLET, FILM COATED ORAL at 05:23

## 2025-01-25 RX ADMIN — Medication 25 MILLIGRAM(S): at 05:24

## 2025-01-25 RX ADMIN — ASPIRIN 81 MILLIGRAM(S): 81 TABLET, COATED ORAL at 11:33

## 2025-01-25 RX ADMIN — SACUBITRIL AND VALSARTAN 1 TABLET(S): 49; 51 TABLET, FILM COATED ORAL at 18:15

## 2025-01-25 NOTE — PATIENT PROFILE ADULT - FUNCTIONAL ASSESSMENT - BASIC MOBILITY ASSESSMENT TYPE
fever x 5 days.. +vomiting +throat pain. here for poor PO intake. tylenol in AM but vomited. denies pmh, eye surgery, nkda. vaccines UTD.
Admission

## 2025-01-25 NOTE — PROGRESS NOTE ADULT - SUBJECTIVE AND OBJECTIVE BOX
Hospitalist Daily Progress Note     *SUBJECTIVE*    Interval Events:  NAEON, Resting comfortably. HD Stable.      *OBJECTIVE*    PHYSICAL EXAM:    CONSTITUTIONAL: Obese comfortable on bipap   RESP: No respiratory distress, no use of accessory muscles; Crackles throughout lung fields   CV: RRR, +S1S2, no MRG; no JVD; no peripheral edema  GI: Soft, NT, ND, no rebound, no guarding; no palpable masses; no hepatosplenomegaly; no hernia palpated    OBJECTIVE DATA:   Vital Signs Last 24 Hrs  T(C): 36.9 (2025 04:32), Max: 36.9 (2025 11:00)  T(F): 98.4 (2025 04:32), Max: 98.5 (2025 14:30)  HR: 94 (2025 04:32) (73 - 94)  BP: 155/95 (2025 04:32) (127/77 - 179/69)  BP(mean): --  RR: 18 (2025 04:32) (15 - 23)  SpO2: 98% (2025 04:32) (94% - 100%)    Parameters below as of 2025 04:32  Patient On (Oxygen Delivery Method): nasal cannula  O2 Flow (L/min): 2             Daily Height in cm: 167.64 (2025 22:45)    Daily Weight in k.6 (2025 04:32)    Labs, Interval Radiology studies, Medications reviewed by me         Hospitalist Daily Progress Note     *SUBJECTIVE*    Interval Events:  NAEON, Resting comfortably. HD Stable.  Now on 2LNC     *OBJECTIVE*    PHYSICAL EXAM:    CONSTITUTIONAL: Obese, NAD   RESP: No respiratory distress, no use of accessory muscles; Crackles throughout lung fields   CV: RRR, +S1S2, no MRG; no JVD; no peripheral edema  GI: Soft, NT, ND, no rebound, no guarding; no palpable masses; no hepatosplenomegaly; no hernia palpated    OBJECTIVE DATA:   Vital Signs Last 24 Hrs  T(C): 36.9 (2025 04:32), Max: 36.9 (2025 11:00)  T(F): 98.4 (2025 04:32), Max: 98.5 (2025 14:30)  HR: 94 (2025 04:32) (73 - 94)  BP: 155/95 (2025 04:32) (127/77 - 179/69)  BP(mean): --  RR: 18 (2025 04:32) (15 - 23)  SpO2: 98% (2025 04:32) (94% - 100%)    Parameters below as of 2025 04:32  Patient On (Oxygen Delivery Method): nasal cannula  O2 Flow (L/min): 2             Daily Height in cm: 167.64 (2025 22:45)    Daily Weight in k.6 (2025 04:32)    Labs, Interval Radiology studies, Medications reviewed by me

## 2025-01-25 NOTE — PATIENT PROFILE ADULT - FALL HARM RISK - HARM RISK INTERVENTIONS

## 2025-01-25 NOTE — PROGRESS NOTE ADULT - ASSESSMENT
49 years old female with h/o HTN, HLD, CKD, DM, HF ( systolic and diastolic) present to ED with complain of worsening SOB. Admit for Covid19, AE HFrEF     ##Acute respiratory failure with hypoxia.   ##Acute on chronic combined systolic and diastolic congestive heart failure.   Worsening SOB.  On exam, bilateral crackles. Clinically volume overloaded. Echo from 09/16/24 reviewed: Left ventricular cavity is mildly dilated. Left ventricular systolic function is mildly decreased with an ejection fraction visually estimated at 45 to 50 %. Severe left ventricular hypertrophy.There is severe (grade 3) left ventricular diastolic dysfunction.with reduced EF. BNP 12k+. Troponin slightly elevated. Attempted to get CTPE but patient refused. TTE suggestive of non-obstructive HCM vs. infiltrative cardiomyopathy. She was supposed to follow up with cardiologist for amyloid evaluation. CXR some increase in the infiltrative patterns in the   lungs. Now off BIpap  - C/w IV lasix 80mg daily; Strict I/Os. Monitor electrolytes   - Check V/Q scan to r/o PE   - Repeat Echo   - GDMT: Entresto, Coreg. Restart SGLT2 when off IV Lasix    - C/w Tele     ##COVID19+   - First positive on the 01/09/25. Suspect patient is shedding virus and thus still positive. Will hold off on remdesivir and Dexamethasone for now.  - Monitor     # Benign essential HTN.   ·  C/w Coreg, Entresto     ##Hyperlipidemia, unspecified.   ·  Not on statin? Will clarify with patient.     ## Type 2 diabetes mellitus with unspecified complications.   - Check A1c   - C/w  SSI  w/ hypoglycemia protocol     #Chronic kidney disease (CKD).   ·  Cr 1.9 around her BL.    - C/w Diuresis  - Avoid nephrotoxic agents     #Obesity   BMI 34.5. Encourage diet and exercise.     Diet: DASH  DVT prophylaxis: SQHeparin  Dispo: Tele   Code Status: FC     I have spent a total of *** minutes to prepare to see the patient, obtaining and reviewing history, physical examination, explaining the diagnosis, prognosis and treatment plan with the patient/family/caregiver. I also have spent the time ordering studies and testing, interpreting results, medicine reconciliation, IDRs, subspecialty consultation and documentation as above.       49 years old female with h/o HTN, HLD, CKD, DM, HF ( systolic and diastolic) present to ED with complain of worsening SOB. Admit for Covid19, AE HFrEF     ##Acute respiratory failure with hypoxia.   ##Acute on chronic combined systolic and diastolic congestive heart failure.   Worsening SOB.  On exam, bilateral crackles. Clinically volume overloaded. Echo from 09/16/24 reviewed: Left ventricular cavity is mildly dilated. Left ventricular systolic function is mildly decreased with an ejection fraction visually estimated at 45 to 50 %. Severe left ventricular hypertrophy.There is severe (grade 3) left ventricular diastolic dysfunction.with reduced EF. BNP 12k+. Troponin slightly elevated. Attempted to get CTPE but patient refused. TTE suggestive of non-obstructive HCM vs. infiltrative cardiomyopathy. She was supposed to follow up with cardiologist for amyloid evaluation. CXR some increase in the infiltrative patterns in the   lungs. Now off Bipap  - C/w IV lasix 80mg daily; Strict I/Os. Monitor electrolytes   - Refusing CTA chest to r/oPE -> Check V/Q scan to r/o PE   - Repeat Echo   - GDMT: Entresto, Coreg. Restart SGLT2 when off IV Lasix    - C/w Tele     #COVID19+   - First positive on the 01/09/25. Suspect patient is shedding virus and thus still positive. Will hold off on remdesivir and Dexamethasone for now.  - Monitor     # Benign essential HTN.   ·  C/w Coreg, Entresto     #Hyperlipidemia, unspecified.   ·  Not on statin? Will clarify with patient.     # Type 2 diabetes mellitus with unspecified complications.   - A1c 8.1; Refusing insulin. Will restart her home Tradjenta 5/d     #Chronic kidney disease (CKD).   ·  Cr around her BL    - C/w Diuresis  - Avoid nephrotoxic agents     #Obesity   BMI 34.5. Encourage diet and exercise.     Diet: DASH  DVT prophylaxis: SQHeparin(she is refusing)   Dispo: Tele   Code Status: FC     I have spent a total of *** minutes to prepare to see the patient, obtaining and reviewing history, physical examination, explaining the diagnosis, prognosis and treatment plan with the patient/family/caregiver. I also have spent the time ordering studies and testing, interpreting results, medicine reconciliation, IDRs, subspecialty consultation and documentation as above.       49 years old female with h/o HTN, HLD, CKD, DM, HF ( systolic and diastolic) present to ED with complain of worsening SOB. Admit for Covid19, AE HFrEF     ##Acute respiratory failure with hypoxia.   ##Acute on chronic combined systolic and diastolic congestive heart failure.   Worsening SOB.  On exam, bilateral crackles. Clinically volume overloaded. Echo from 09/16/24 reviewed: Left ventricular cavity is mildly dilated. Left ventricular systolic function is mildly decreased with an ejection fraction visually estimated at 45 to 50 %. Severe left ventricular hypertrophy.There is severe (grade 3) left ventricular diastolic dysfunction.with reduced EF. BNP 12k+. Troponin slightly elevated. Attempted to get CTPE but patient refused. TTE suggestive of non-obstructive HCM vs. infiltrative cardiomyopathy. She was supposed to follow up with cardiologist for amyloid evaluation. CXR some increase in the infiltrative patterns in the   lungs. Now off Bipap  - C/w IV lasix 80mg daily; Strict I/Os. Monitor electrolytes   - Refusing CTA chest to r/o PE -> Check V/Q scan to r/o PE   - Repeat Echo   - GDMT: Entresto, Coreg. Restart SGLT2 when off IV Lasix    - C/w Tele     #COVID19+   - First positive on the 01/09/25. Suspect patient is shedding virus and thus still positive. Will hold off on remdesivir and Dexamethasone for now.  - Monitor     # Benign essential HTN.   ·  C/w Coreg, Entresto     #Hyperlipidemia, unspecified.   ·  Not on statin? Adament that she does not have high cholesterol. Educated and Encouraged her to f/u with her PCP.     # Type 2 diabetes mellitus with unspecified complications.   - A1c 8.1; Refusing insulin. Will restart her home Tradjenta 5/d     #Chronic kidney disease (CKD).   ·  Cr around her BL    - C/w Diuresis  - Avoid nephrotoxic agents     #Obesity   BMI 34.5. Encourage diet and exercise.     Diet: DASH  DVT prophylaxis: SQHeparin(she is refusing)   Dispo: Tele   Code Status: FC     I have spent a total of 48 minutes to prepare to see the patient, obtaining and reviewing history, physical examination, explaining the diagnosis, prognosis and treatment plan with the patient/family/caregiver. I also have spent the time ordering studies and testing, interpreting results, medicine reconciliation, IDRs, subspecialty consultation and documentation as above.

## 2025-01-26 LAB
A1C WITH ESTIMATED AVERAGE GLUCOSE RESULT: 8 % — HIGH (ref 4–5.6)
ANION GAP SERPL CALC-SCNC: 3 MMOL/L — LOW (ref 5–17)
BASOPHILS # BLD AUTO: 0.02 K/UL — SIGNIFICANT CHANGE UP (ref 0–0.2)
BASOPHILS NFR BLD AUTO: 0.2 % — SIGNIFICANT CHANGE UP (ref 0–2)
BUN SERPL-MCNC: 36 MG/DL — HIGH (ref 7–23)
CALCIUM SERPL-MCNC: 8.8 MG/DL — SIGNIFICANT CHANGE UP (ref 8.5–10.1)
CHLORIDE SERPL-SCNC: 104 MMOL/L — SIGNIFICANT CHANGE UP (ref 96–108)
CHOLEST SERPL-MCNC: 154 MG/DL — SIGNIFICANT CHANGE UP
CO2 SERPL-SCNC: 33 MMOL/L — HIGH (ref 22–31)
CREAT SERPL-MCNC: 2.19 MG/DL — HIGH (ref 0.5–1.3)
EGFR: 27 ML/MIN/1.73M2 — LOW
EOSINOPHIL # BLD AUTO: 0.16 K/UL — SIGNIFICANT CHANGE UP (ref 0–0.5)
EOSINOPHIL NFR BLD AUTO: 1.6 % — SIGNIFICANT CHANGE UP (ref 0–6)
ESTIMATED AVERAGE GLUCOSE: 183 MG/DL — HIGH (ref 68–114)
FLUAV AG NPH QL: SIGNIFICANT CHANGE UP
FLUBV AG NPH QL: SIGNIFICANT CHANGE UP
GLUCOSE SERPL-MCNC: 192 MG/DL — HIGH (ref 70–99)
HCT VFR BLD CALC: 29.1 % — LOW (ref 34.5–45)
HDLC SERPL-MCNC: 61 MG/DL — SIGNIFICANT CHANGE UP
HGB BLD-MCNC: 9.8 G/DL — LOW (ref 11.5–15.5)
IMM GRANULOCYTES NFR BLD AUTO: 0.3 % — SIGNIFICANT CHANGE UP (ref 0–0.9)
LIPID PNL WITH DIRECT LDL SERPL: 80 MG/DL — SIGNIFICANT CHANGE UP
LYMPHOCYTES # BLD AUTO: 2.54 K/UL — SIGNIFICANT CHANGE UP (ref 1–3.3)
LYMPHOCYTES # BLD AUTO: 25.9 % — SIGNIFICANT CHANGE UP (ref 13–44)
MAGNESIUM SERPL-MCNC: 2.3 MG/DL — SIGNIFICANT CHANGE UP (ref 1.6–2.6)
MCHC RBC-ENTMCNC: 25.3 PG — LOW (ref 27–34)
MCHC RBC-ENTMCNC: 33.7 G/DL — SIGNIFICANT CHANGE UP (ref 32–36)
MCV RBC AUTO: 75 FL — LOW (ref 80–100)
MONOCYTES # BLD AUTO: 0.55 K/UL — SIGNIFICANT CHANGE UP (ref 0–0.9)
MONOCYTES NFR BLD AUTO: 5.6 % — SIGNIFICANT CHANGE UP (ref 2–14)
NEUTROPHILS # BLD AUTO: 6.5 K/UL — SIGNIFICANT CHANGE UP (ref 1.8–7.4)
NEUTROPHILS NFR BLD AUTO: 66.4 % — SIGNIFICANT CHANGE UP (ref 43–77)
NON HDL CHOLESTEROL: 93 MG/DL — SIGNIFICANT CHANGE UP
NRBC # BLD: 0 /100 WBCS — SIGNIFICANT CHANGE UP (ref 0–0)
NRBC BLD-RTO: 0 /100 WBCS — SIGNIFICANT CHANGE UP (ref 0–0)
PLATELET # BLD AUTO: 207 K/UL — SIGNIFICANT CHANGE UP (ref 150–400)
POTASSIUM SERPL-MCNC: 3.9 MMOL/L — SIGNIFICANT CHANGE UP (ref 3.5–5.3)
POTASSIUM SERPL-SCNC: 3.9 MMOL/L — SIGNIFICANT CHANGE UP (ref 3.5–5.3)
RBC # BLD: 3.88 M/UL — SIGNIFICANT CHANGE UP (ref 3.8–5.2)
RBC # FLD: 15.9 % — HIGH (ref 10.3–14.5)
RSV RNA NPH QL NAA+NON-PROBE: SIGNIFICANT CHANGE UP
SARS-COV-2 RNA SPEC QL NAA+PROBE: SIGNIFICANT CHANGE UP
SODIUM SERPL-SCNC: 140 MMOL/L — SIGNIFICANT CHANGE UP (ref 135–145)
TRIGL SERPL-MCNC: 66 MG/DL — SIGNIFICANT CHANGE UP
WBC # BLD: 9.8 K/UL — SIGNIFICANT CHANGE UP (ref 3.8–10.5)
WBC # FLD AUTO: 9.8 K/UL — SIGNIFICANT CHANGE UP (ref 3.8–10.5)

## 2025-01-26 PROCEDURE — 93321 DOPPLER ECHO F-UP/LMTD STD: CPT | Mod: 26

## 2025-01-26 PROCEDURE — 93308 TTE F-UP OR LMTD: CPT | Mod: 26

## 2025-01-26 PROCEDURE — 93325 DOPPLER ECHO COLOR FLOW MAPG: CPT | Mod: 26

## 2025-01-26 PROCEDURE — 99232 SBSQ HOSP IP/OBS MODERATE 35: CPT

## 2025-01-26 RX ADMIN — Medication 80 MILLIGRAM(S): at 05:50

## 2025-01-26 RX ADMIN — Medication 25 MILLIGRAM(S): at 17:34

## 2025-01-26 RX ADMIN — ASPIRIN 81 MILLIGRAM(S): 81 TABLET, COATED ORAL at 12:06

## 2025-01-26 RX ADMIN — SACUBITRIL AND VALSARTAN 1 TABLET(S): 49; 51 TABLET, FILM COATED ORAL at 17:34

## 2025-01-26 RX ADMIN — Medication 10 MILLIGRAM(S): at 05:50

## 2025-01-26 RX ADMIN — SACUBITRIL AND VALSARTAN 1 TABLET(S): 49; 51 TABLET, FILM COATED ORAL at 05:50

## 2025-01-26 RX ADMIN — Medication 25 MILLIGRAM(S): at 05:50

## 2025-01-26 RX ADMIN — Medication 80 MILLIGRAM(S): at 14:33

## 2025-01-26 RX ADMIN — LINAGLIPTIN 5 MILLIGRAM(S): 5 TABLET, FILM COATED ORAL at 12:06

## 2025-01-26 NOTE — PROGRESS NOTE ADULT - ASSESSMENT
49 years old female with h/o HTN, HLD, CKD, DM, HF ( systolic and diastolic) present to ED with complain of worsening SOB. Admit for Covid19, AE HFrEF     ##Acute respiratory failure with hypoxia.   ##Acute on chronic combined systolic and diastolic congestive heart failure.   Worsening SOB.  On exam, bilateral crackles. Clinically volume overloaded. Echo from 09/16/24 reviewed: Left ventricular cavity is mildly dilated. Left ventricular systolic function is mildly decreased with an ejection fraction visually estimated at 45 to 50 %. Severe left ventricular hypertrophy.There is severe (grade 3) left ventricular diastolic dysfunction.with reduced EF. BNP 12k+. Troponin slightly elevated. Attempted to get CTPE but patient refused. TTE suggestive of non-obstructive HCM vs. infiltrative cardiomyopathy. She was supposed to follow up with cardiologist for amyloid evaluation. CXR some increase in the infiltrative patterns in the   lungs. Now off Bipap  - C/w IV lasix 80mg daily; Strict I/Os. Monitor electrolytes   - Refusing CTA chest to r/o PE -> Check V/Q scan to r/o PE   - Repeat Echo pending   - GDMT: Entresto, Coreg. Restart SGLT2 when off IV Lasix    - C/w Tele     #COVID19+   - First positive on the 01/09/25. Suspect patient is shedding virus and thus still positive. Will hold off on remdesivir and Dexamethasone for now.  - Monitor     # Benign essential HTN.   ·  C/w Coreg, Entresto     #Hyperlipidemia, unspecified.   ·  Not on statin? Adament that she does not have high cholesterol. Educated and Encouraged her to f/u with her PCP.     # Type 2 diabetes mellitus with unspecified complications.   - A1c 8.1; Refusing insulin. C/w  home Tradjenta 5/d     #Chronic kidney disease (CKD).   ·  Cr slightly higher than baseline   - C/w Diuresis  - Avoid nephrotoxic agents     #Obesity   BMI 34.5. Encourage diet and exercise.     Diet: DASH  DVT prophylaxis: SQHeparin(she is refusing)   Dispo: Tele   Code Status: FC     I have spent a total of 37 minutes to prepare to see the patient, obtaining and reviewing history, physical examination, explaining the diagnosis, prognosis and treatment plan with the patient/family/caregiver. I also have spent the time ordering studies and testing, interpreting results, medicine reconciliation, IDRs, subspecialty consultation and documentation as above.

## 2025-01-26 NOTE — PROGRESS NOTE ADULT - SUBJECTIVE AND OBJECTIVE BOX
Hospitalist Daily Progress Note     *SUBJECTIVE*    Interval Events:  NAEON, Resting comfortably. HD Stable.      *OBJECTIVE*    PHYSICAL EXAM:  CONSTITUTIONAL: Obese, NAD   RESP: No respiratory distress, no use of accessory muscles; Crackles throughout lung fields   CV: RRR, +S1S2, no MRG; no JVD; no peripheral edema  GI: Soft, NT, ND, no rebound, no guarding; no palpable masses; no hepatosplenomegaly; no hernia palpated    OBJECTIVE DATA:   Vital Signs Last 24 Hrs  T(C): 36.7 (2025 10:33), Max: 36.8 (2025 17:06)  T(F): 98 (2025 10:33), Max: 98.2 (2025 17:06)  HR: 78 (2025 10:33) (78 - 93)  BP: 129/87 (2025 10:33) (129/87 - 147/92)  BP(mean): --  RR: 20 (2025 10:33) (19 - 20)  SpO2: 98% (2025 10:33) (95% - 100%)    Parameters below as of 2025 10:33  Patient On (Oxygen Delivery Method): nasal cannula w/ humidification               Daily     Daily Weight in k.8 (2025 10:33)    Labs, Interval Radiology studies, Medications reviewed by me

## 2025-01-27 LAB
ANION GAP SERPL CALC-SCNC: 6 MMOL/L — SIGNIFICANT CHANGE UP (ref 5–17)
BUN SERPL-MCNC: 38 MG/DL — HIGH (ref 7–23)
CALCIUM SERPL-MCNC: 8.3 MG/DL — LOW (ref 8.5–10.1)
CHLORIDE SERPL-SCNC: 104 MMOL/L — SIGNIFICANT CHANGE UP (ref 96–108)
CO2 SERPL-SCNC: 30 MMOL/L — SIGNIFICANT CHANGE UP (ref 22–31)
CREAT SERPL-MCNC: 2.31 MG/DL — HIGH (ref 0.5–1.3)
EGFR: 25 ML/MIN/1.73M2 — LOW
GLUCOSE BLDC GLUCOMTR-MCNC: 124 MG/DL — HIGH (ref 70–99)
GLUCOSE SERPL-MCNC: 209 MG/DL — HIGH (ref 70–99)
MAGNESIUM SERPL-MCNC: 2.3 MG/DL — SIGNIFICANT CHANGE UP (ref 1.6–2.6)
PHOSPHATE SERPL-MCNC: 4.3 MG/DL — SIGNIFICANT CHANGE UP (ref 2.5–4.5)
POTASSIUM SERPL-MCNC: 3.8 MMOL/L — SIGNIFICANT CHANGE UP (ref 3.5–5.3)
POTASSIUM SERPL-SCNC: 3.8 MMOL/L — SIGNIFICANT CHANGE UP (ref 3.5–5.3)
SODIUM SERPL-SCNC: 140 MMOL/L — SIGNIFICANT CHANGE UP (ref 135–145)

## 2025-01-27 PROCEDURE — 71045 X-RAY EXAM CHEST 1 VIEW: CPT | Mod: 26

## 2025-01-27 PROCEDURE — 99232 SBSQ HOSP IP/OBS MODERATE 35: CPT

## 2025-01-27 RX ADMIN — ASPIRIN 81 MILLIGRAM(S): 81 TABLET, COATED ORAL at 12:38

## 2025-01-27 RX ADMIN — Medication 80 MILLIGRAM(S): at 05:17

## 2025-01-27 RX ADMIN — LINAGLIPTIN 5 MILLIGRAM(S): 5 TABLET, FILM COATED ORAL at 12:38

## 2025-01-27 RX ADMIN — Medication 80 MILLIGRAM(S): at 14:13

## 2025-01-27 RX ADMIN — SACUBITRIL AND VALSARTAN 1 TABLET(S): 49; 51 TABLET, FILM COATED ORAL at 06:54

## 2025-01-27 RX ADMIN — SACUBITRIL AND VALSARTAN 1 TABLET(S): 49; 51 TABLET, FILM COATED ORAL at 17:27

## 2025-01-27 RX ADMIN — Medication 10 MILLIGRAM(S): at 12:38

## 2025-01-27 RX ADMIN — Medication 25 MILLIGRAM(S): at 17:27

## 2025-01-27 RX ADMIN — Medication 25 MILLIGRAM(S): at 06:54

## 2025-01-27 NOTE — PROGRESS NOTE ADULT - SUBJECTIVE AND OBJECTIVE BOX
Hospitalist Daily Progress Note     *SUBJECTIVE*    Interval Events:  NAEON, Resting comfortably. HD Stable.      *OBJECTIVE*    PHYSICAL EXAM:  CONSTITUTIONAL: Obese, NAD   RESP: No respiratory distress, no use of accessory muscles; Crackles throughout lung fields   CV: RRR, +S1S2, no MRG; no JVD; no peripheral edema  GI: Soft, NT, ND, no rebound, no guarding; no palpable masses; no hepatosplenomegaly; no hernia palpated    OBJECTIVE DATA:   Vital Signs Last 24 Hrs  T(C): 36.9 (2025 05:07), Max: 36.9 (2025 05:07)  T(F): 98.4 (2025 05:07), Max: 98.4 (2025 05:07)  HR: 79 (2025 06:51) (71 - 96)  BP: 126/76 (2025 06:51) (104/71 - 135/83)  BP(mean): --  RR: 18 (2025 05:07) (16 - 20)  SpO2: 97% (2025 05:07) (97% - 100%)    Parameters below as of 2025 05:07  Patient On (Oxygen Delivery Method): nasal cannula  O2 Flow (L/min): 2             Daily     Daily Weight in k.8 (2025 10:33)    Labs, Interval Radiology studies, Medications reviewed by me         Hospitalist Daily Progress Note     *SUBJECTIVE*    Interval Events:  NAEON, Resting comfortably. HD Stable.      *OBJECTIVE*    PHYSICAL EXAM:  CONSTITUTIONAL: Obese, NAD   RESP: No respiratory distress, no use of accessory muscles CTAB  CV: RRR, +S1S2, no MRG; no JVD; no peripheral edema  GI: Soft, NT, ND, no rebound, no guarding; no palpable masses; no hepatosplenomegaly; no hernia palpated    OBJECTIVE DATA:   Vital Signs Last 24 Hrs  T(C): 36.9 (2025 05:07), Max: 36.9 (2025 05:07)  T(F): 98.4 (2025 05:07), Max: 98.4 (2025 05:07)  HR: 79 (2025 06:51) (71 - 96)  BP: 126/76 (2025 06:51) (104/71 - 135/83)  BP(mean): --  RR: 18 (2025 05:07) (16 - 20)  SpO2: 97% (2025 05:07) (97% - 100%)    Parameters below as of 2025 05:07  Patient On (Oxygen Delivery Method): nasal cannula  O2 Flow (L/min): 2             Daily     Daily Weight in k.8 (2025 10:33)    Labs, Interval Radiology studies, Medications reviewed by me

## 2025-01-27 NOTE — DIETITIAN INITIAL EVALUATION ADULT - ORAL INTAKE PTA/DIET HISTORY
Pt reports fair appetite and PO intake PTA. States she does not cook with salt at home and has made "changes" to her diet recently due to diabetes- would not elaborate on changes.

## 2025-01-27 NOTE — PROGRESS NOTE ADULT - ASSESSMENT
49 years old female with h/o HTN, HLD, CKD, DM, HF ( systolic and diastolic) present to ED with complain of worsening SOB. Admit for Covid19, AE HFrEF     #Acute respiratory failure with hypoxia  #Acute on chronic combined systolic and diastolic congestive heart failure.   Worsening SOB.  On exam, bilateral crackles. Clinically volume overloaded. Echo from 09/16/24 reviewed: Left ventricular cavity is mildly dilated. Left ventricular systolic function is mildly decreased with an ejection fraction visually estimated at 45 to 50 %. Severe left ventricular hypertrophy.There is severe (grade 3) left ventricular diastolic dysfunction.with reduced EF. BNP 12k+. Troponin slightly elevated. Attempted to get CTPE but patient refused. TTE suggestive of non-obstructive HCM vs. infiltrative cardiomyopathy. She was supposed to follow up with cardiologist for amyloid evaluation. CXR some increase in the infiltrative patterns in the   lungs. Now off Bipap  - C/w IV lasix 80mg daily; Strict I/Os. Monitor electrolytes   - Refusing CTA chest to r/o PE -> Check V/Q scan to r/o PE   - Repeat Echo pending   - GDMT: Entresto, Coreg. Restart SGLT2 when off IV Lasix    - C/w Tele     #COVID19+   - First positive on the 01/09/25. Suspect patient is shedding virus and thus still positive. Will hold off on remdesivir and Dexamethasone for now.  - Monitor     # Benign essential HTN.   ·  C/w Coreg, Entresto     #Hyperlipidemia, unspecified.   ·  Not on statin? Adament that she does not have high cholesterol. Educated and Encouraged her to f/u with her PCP.     # Type 2 diabetes mellitus with unspecified complications.   - A1c 8.1; Refusing insulin. C/w  home Tradjenta 5/d     #Chronic kidney disease (CKD).   ·  Cr slightly higher than baseline   - C/w Diuresis  - Avoid nephrotoxic agents     #Obesity   BMI 34.5. Encourage diet and exercise.     Diet: DASH  DVT prophylaxis: SQHeparin(she is refusing)   Dispo: Tele   Code Status: FC     I have spent a total of *** minutes to prepare to see the patient, obtaining and reviewing history, physical examination, explaining the diagnosis, prognosis and treatment plan with the patient/family/caregiver. I also have spent the time ordering studies and testing, interpreting results, medicine reconciliation, IDRs, subspecialty consultation and documentation as above.       49 years old female with h/o HTN, HLD, CKD, DM, HF ( systolic and diastolic) present to ED with complain of worsening SOB. Admit for Covid19, AE HFrEF     #Acute respiratory failure with hypoxia  #Acute on chronic combined systolic and diastolic congestive heart failure.   Worsening SOB.  On exam, bilateral crackles. Clinically volume overloaded. Echo from 09/16/24 reviewed: Left ventricular cavity is mildly dilated. Left ventricular systolic function is mildly decreased with an ejection fraction visually estimated at 45 to 50 %. Severe left ventricular hypertrophy.There is severe (grade 3) left ventricular diastolic dysfunction.with reduced EF. BNP 12k+. Troponin slightly elevated. Attempted to get CTPE but patient refused. TTE suggestive of non-obstructive HCM vs. infiltrative cardiomyopathy. She was supposed to follow up with cardiologist for amyloid evaluation -> has appointment on the 14th. CXR some increase in the infiltrative patterns in the lungs. Now off Bipap. Repeat echo with EF 43%.   - C/w IV lasix 80mg daily; Strict I/Os. Monitor electrolytes   - Refusing CTA chest to r/o PE -> Check V/Q scan to r/o PE   - GDMT: Entresto, Coreg. Restart SGLT2 when off IV Lasix    - C/w Tele     #COVID19+   - First positive on the 01/09/25. Suspect patient is shedding virus and thus still positive. Will hold off on remdesivir and Dexamethasone for now. Retest negative.     # Benign essential HTN.   ·  C/w Coreg, Entresto     #Hyperlipidemia, unspecified.   ·  Not on statin? Adamant that she does not have high cholesterol. Educated and Encouraged her to f/u with her PCP.     # Type 2 diabetes mellitus with unspecified complications.   - A1c 8.1; Refusing insulin. C/w  home Tradjenta 5/d     #Chronic kidney disease (CKD).   ·  Cr slightly higher than baseline   - C/w Diuresis  - Avoid nephrotoxic agents     #Obesity   BMI 34.5. Encourage diet and exercise.     Diet: DASH  DVT prophylaxis: SQHeparin(she is refusing)   Dispo: Tele   Code Status: FC     I have spent a total of 45 minutes to prepare to see the patient, obtaining and reviewing history, physical examination, explaining the diagnosis, prognosis and treatment plan with the patient/family/caregiver. I also have spent the time ordering studies and testing, interpreting results, medicine reconciliation, IDRs, subspecialty consultation and documentation as above.

## 2025-01-27 NOTE — DIETITIAN INITIAL EVALUATION ADULT - DIET TYPE
if Finger Sticks become uncontrolled consider consistent carbohydrate diet and add low sodium if pt continues to retain fluids

## 2025-01-27 NOTE — DIETITIAN INITIAL EVALUATION ADULT - ENERGY INTAKE
Pt with variable appetite and intake during LOS as per pt report and flow sheets. Observed pt did not consume lunch today- requested substitute item; diet office made aware.

## 2025-01-27 NOTE — DIETITIAN INITIAL EVALUATION ADULT - PERTINENT LABORATORY DATA
01-27    140  |  104  |  38[H]  ----------------------------<  209[H]  3.8   |  30  |  2.31[H]    Ca    8.3[L]      27 Jan 2025 11:10  Phos  4.3     01-27  Mg     2.3     01-27    TPro  6.9  /  Alb  2.8[L]  /  TBili  0.4  /  DBili  x   /  AST  9[L]  /  ALT  24  /  AlkPhos  99  01-25  CAPILLARY BLOOD GLUCOSE    POCT Blood Glucose.: 124 mg/dL (27 Jan 2025 07:34)    A1C with Estimated Average Glucose Result: 8.0 % (01-25-25 @ 14:45)  A1C with Estimated Average Glucose Result: 8.1 % (01-24-25 @ 05:45)  A1C with Estimated Average Glucose Result: 8.0 % (01-11-25 @ 09:50)

## 2025-01-27 NOTE — DIETITIAN INITIAL EVALUATION ADULT - PERTINENT MEDS FT
MEDICATIONS  (STANDING):  amLODIPine   Tablet 10 milliGRAM(s) Oral daily  aspirin  chewable 81 milliGRAM(s) Oral daily  carvedilol 25 milliGRAM(s) Oral every 12 hours  furosemide   Injectable 80 milliGRAM(s) IV Push two times a day  heparin   Injectable 5000 Unit(s) SubCutaneous every 8 hours  influenza   Vaccine 0.5 milliLiter(s) IntraMuscular once  linagliptin 5 milliGRAM(s) Oral daily  sacubitril 97 mG/valsartan 103 mG 1 Tablet(s) Oral two times a day    MEDICATIONS  (PRN):  acetaminophen     Tablet .. 650 milliGRAM(s) Oral every 6 hours PRN Temp greater or equal to 38C (100.4F), Mild Pain (1 - 3)  aluminum hydroxide/magnesium hydroxide/simethicone Suspension 30 milliLiter(s) Oral every 4 hours PRN Dyspepsia  melatonin 3 milliGRAM(s) Oral at bedtime PRN Insomnia  ondansetron Injectable 4 milliGRAM(s) IV Push every 8 hours PRN Nausea and/or Vomiting

## 2025-01-27 NOTE — DIETITIAN INITIAL EVALUATION ADULT - OTHER INFO
Denies difficulty chewing/swallowing. Reports weight fluctuates due to fluid status; unsure of UBW. Per previous RD note (01/13/25) pt with stable weight.  Pt with T2DM; reports Tradjenta and Jardiance PTA. Pt reports she has recently stopped monitoring blood glucose levels at home. Hba1c 8.0% indicates fair blood glucose management.  Pt refused any diet restrictions at this time; educated on importance of consistent carbohydrate and low salt diet- continues to refuse.  Made aware RD remains available.

## 2025-01-28 PROCEDURE — 99232 SBSQ HOSP IP/OBS MODERATE 35: CPT

## 2025-01-28 RX ADMIN — Medication 10 MILLIGRAM(S): at 06:18

## 2025-01-28 RX ADMIN — SACUBITRIL AND VALSARTAN 1 TABLET(S): 49; 51 TABLET, FILM COATED ORAL at 17:36

## 2025-01-28 RX ADMIN — Medication 25 MILLIGRAM(S): at 17:36

## 2025-01-28 RX ADMIN — LINAGLIPTIN 5 MILLIGRAM(S): 5 TABLET, FILM COATED ORAL at 11:37

## 2025-01-28 RX ADMIN — ASPIRIN 81 MILLIGRAM(S): 81 TABLET, COATED ORAL at 11:38

## 2025-01-28 RX ADMIN — SACUBITRIL AND VALSARTAN 1 TABLET(S): 49; 51 TABLET, FILM COATED ORAL at 06:18

## 2025-01-28 RX ADMIN — Medication 80 MILLIGRAM(S): at 05:20

## 2025-01-28 RX ADMIN — Medication 80 MILLIGRAM(S): at 13:33

## 2025-01-28 RX ADMIN — Medication 25 MILLIGRAM(S): at 05:20

## 2025-01-28 NOTE — PROGRESS NOTE ADULT - SUBJECTIVE AND OBJECTIVE BOX
Hospitalist Daily Progress Note     *SUBJECTIVE*    Interval Events:  NAEON, Resting comfortably. HD Stable.      *OBJECTIVE*    PHYSICAL EXAM:  CONSTITUTIONAL: Obese, NAD   RESP: No respiratory distress, no use of accessory muscles CTAB  CV: RRR, +S1S2, no MRG; no JVD; no peripheral edema  GI: Soft, NT, ND, no rebound, no guarding; no palpable masses; no hepatosplenomegaly; no hernia palpated    OBJECTIVE DATA:   Vital Signs Last 24 Hrs  T(C): 36.6 (2025 10:22), Max: 36.8 (2025 23:50)  T(F): 97.9 (2025 10:22), Max: 98.3 (2025 23:50)  HR: 85 (2025 10:22) (85 - 88)  BP: 111/71 (2025 10:22) (111/71 - 143/85)  BP(mean): --  RR: 18 (2025 10:22) (18 - 18)  SpO2: 98% (2025 13:54) (94% - 98%)    Parameters below as of 2025 13:54  Patient On (Oxygen Delivery Method): room air               Daily     Daily Weight in k (2025 05:05)    Labs, Interval Radiology studies, Medications reviewed by me

## 2025-01-29 ENCOUNTER — TRANSCRIPTION ENCOUNTER (OUTPATIENT)
Age: 50
End: 2025-01-29

## 2025-01-29 VITALS
TEMPERATURE: 98 F | SYSTOLIC BLOOD PRESSURE: 136 MMHG | RESPIRATION RATE: 17 BRPM | DIASTOLIC BLOOD PRESSURE: 69 MMHG | HEART RATE: 80 BPM | OXYGEN SATURATION: 94 %

## 2025-01-29 PROCEDURE — 99221 1ST HOSP IP/OBS SF/LOW 40: CPT

## 2025-01-29 PROCEDURE — 99239 HOSP IP/OBS DSCHRG MGMT >30: CPT

## 2025-01-29 RX ORDER — BUMETANIDE 2 MG/1
1 TABLET ORAL
Qty: 60 | Refills: 0
Start: 2025-01-29 | End: 2025-02-27

## 2025-01-29 RX ADMIN — ASPIRIN 81 MILLIGRAM(S): 81 TABLET, COATED ORAL at 12:05

## 2025-01-29 RX ADMIN — LINAGLIPTIN 5 MILLIGRAM(S): 5 TABLET, FILM COATED ORAL at 12:05

## 2025-01-29 RX ADMIN — Medication 25 MILLIGRAM(S): at 18:00

## 2025-01-29 RX ADMIN — Medication 25 MILLIGRAM(S): at 05:46

## 2025-01-29 RX ADMIN — SACUBITRIL AND VALSARTAN 1 TABLET(S): 49; 51 TABLET, FILM COATED ORAL at 18:00

## 2025-01-29 RX ADMIN — Medication 10 MILLIGRAM(S): at 05:46

## 2025-01-29 RX ADMIN — Medication 80 MILLIGRAM(S): at 05:45

## 2025-01-29 RX ADMIN — Medication 80 MILLIGRAM(S): at 13:50

## 2025-01-29 RX ADMIN — SACUBITRIL AND VALSARTAN 1 TABLET(S): 49; 51 TABLET, FILM COATED ORAL at 05:46

## 2025-01-29 NOTE — CONSULT NOTE ADULT - SUBJECTIVE AND OBJECTIVE BOX
Date of Admission:2025    CHIEF COMPLAINT:  Shortness of breath    HISTORY OF PRESENT ILLNESS:  Briefly, Mrs. Chay Hubbard is a  pleasant 49 y.o. woman with history of HTN, CKD, non-ischemic cardiomyopathy diagnosed 8 years ago, s/p recent COVID 19 infection, who was admitted with HF exacerbation.  She responded well to diuresis, but was noted to have episodes of NSVT on telemetry.   She reports she was told of these episodes during past hospitalization in 2024.   Denies any chest pain, palpitations, dizziness, syncope.  Has s/sx of MIRZA, was planned for outpatient sleep medicine evaluation.         Allergies  lisinopril (Other)    Intolerances    	    MEDICATIONS:  amLODIPine   Tablet 10 milliGRAM(s) Oral daily  aspirin  chewable 81 milliGRAM(s) Oral daily  carvedilol 25 milliGRAM(s) Oral every 12 hours  furosemide   Injectable 80 milliGRAM(s) IV Push two times a day  heparin   Injectable 5000 Unit(s) SubCutaneous every 8 hours  sacubitril 97 mG/valsartan 103 mG 1 Tablet(s) Oral two times a day  acetaminophen     Tablet .. 650 milliGRAM(s) Oral every 6 hours PRN  melatonin 3 milliGRAM(s) Oral at bedtime PRN  ondansetron Injectable 4 milliGRAM(s) IV Push every 8 hours PRN  aluminum hydroxide/magnesium hydroxide/simethicone Suspension 30 milliLiter(s) Oral every 4 hours PRN  linagliptin 5 milliGRAM(s) Oral daily  influenza   Vaccine 0.5 milliLiter(s) IntraMuscular once      PAST MEDICAL & SURGICAL HISTORY:  Hypertension  Diabetes  Peptic ulcer disease  Chronic systolic congestive heart failure  No significant past surgical history          FAMILY HISTORY:      SOCIAL HISTORY:    [x ] Non-smoker         REVIEW OF SYSTEMS:  General: no fatigue/malaise, weight loss/gain.  Skin: no rashes.  Ophthalmologic: no blurred vision, no loss of vision. 	  ENT: no sore throat, rhinorrhea, sinus congestion.  Respiratory: recent COVID 19 hospitalization  Gastrointestinal:  no N/V/D, no melena/hematemesis/hematochezia.  Genitourinary: no dysuria/hesitancy or hematuria.  Musculoskeletal: no myalgias or arthralgias.  Neurological: no changes in vision or hearing, no lightheadedness/dizziness, no syncope/near syncope	  Psychiatric: no unusual stress/anxiety.   Hematology/Lymphatics: no unusual bleeding, bruising and no lymphadenopathy.  Endocrine: DMII  All others negative except as stated above and in HPI.    PHYSICAL EXAM:  T(C): 36.8 (25 @ 10:14), Max: 37.1 (25 @ 22:55)  HR: 79 (25 @ 10:14) (74 - 85)  BP: 106/63 (25 @ 10:14) (106/63 - 133/87)  RR: 18 (25 @ 10:14) (18 - 18)  SpO2: 95% (25 @ 10:14) (95% - 97%)  Wt(kg): --  I&O's Summary    2025 07:  -  2025 07:00  --------------------------------------------------------  IN: 0 mL / OUT: 3400 mL / NET: -3400 mL    2025 07:  -  2025 15:18  --------------------------------------------------------  IN: 0 mL / OUT: 800 mL / NET: -800 mL        Appearance: Normal	  HEENT:   Normal oral mucosa, PERRL, EOMI	  Lymphatic: No lymphadenopathy  Cardiovascular: Normal S1 S2, No JVD, No murmurs, No edema  Respiratory: Lungs clear to auscultation	  Psychiatry: A & O x 3, Mood & affect appropriate  Gastrointestinal:  Soft, Non-tender, + BS	  Skin: No rashes, No ecchymoses, No cyanosis	  Neurologic: Non-focal  Extremities: Normal range of motion, No clubbing, cyanosis or edema  Vascular: Peripheral pulses palpable 2+ bilaterally        LABS:	2025  H/H 9.8/29    2025 Na 140, K 3.8, , BUN/Creat 38/2.3   	              proBNP: 68674 on 2025  Lipid Profile: , HDL 61, TG 63, LDL 80  HgA1c: 8.0        CARDIAC MARKERS:  tropnin 2025 55    TELEMETRY:  SR, brief episodes of NSVT 4-9 beats at 150-170 bpm    EC2025 ST at 105 bpm, NSSTTWA	   	    PREVIOUS DIAGNOSTIC TESTING:    [ x] Echocardiogram 2025:    1. Left ventricular endocardium is not well visualized; however, the   left ventricular systolic function appears moderately reduced. Left   ventricular systolic function is moderately decreased with an ejection   fraction of 43 % by Juarez's method of disks. There is severe left   ventricular hypertrophy.   2. There is moderate (grade 2) left ventricular diastolic dysfunction.   3. Mild to moderate mitral regurgitation.   4. Aortic valve was not well visualized. Probably trileaflet with normal   systolic excursion. Moderate aortic regurgitation.   5. Pulmonary artery systolic pressure could not be estimated.   6. Aortic root at the sinuses of Valsalva is normal in size, measuring   3.10 cm (indexed 1.45 cm/m²). Ascending aorta is dilated, measuring 3.78   cm (indexed 1.76 cm/m²).   7. Small pericardial effusion with no echocardiographic evidence of   tamponade physiology.   8. The inferior vena cava is normal in size (normal <2.1cm) with normal   inspiratory collapse (normal >50%) consistent with normal right atrial   pressure (  3, range 0-5mmHg).      ASSESSMENT/PLAN:     NSVT episodes, asymptomatic  Cardiomyopathy, several hospitalizations over last year for decompensation, currently euvolemic  HTN, controlled	    Recommend:  Continue current GDMT regimen  EP evaluation outpatient  Advanced HF clinic evaluation - reports she has appointment scheduled  Sleep medicine evaluation outpatient

## 2025-01-29 NOTE — DISCHARGE NOTE PROVIDER - NSDCMRMEDTOKEN_GEN_ALL_CORE_FT
Albuterol (Eqv-ProAir HFA) 90 mcg/inh inhalation aerosol: 2 puff(s) inhaled every 6 hours as needed for  shortness of breath and/or wheezing  amLODIPine 10 mg oral tablet: 1 tab(s) orally once a day  aspirin 81 mg oral delayed release tablet: 1 tab(s) orally once a day  bumetanide 2 mg oral tablet: 1 tab(s) orally 2 times a day  Coreg 25 mg oral tablet: 1 tab(s) orally 2 times a day  Entresto 97 mg-103 mg oral tablet: 1 tab(s) orally 2 times a day  ipratropium-albuterol 0.5 mg-2.5 mg/3 mL inhalation solution: 3 milliliter(s) by nebulizer every 6 hours as needed for  shortness of breath and/or wheezing  Jardiance 25 mg oral tablet: 1 tab(s) orally once a day  nebulizer machine diagnosis acute on chronic resp failure, covid pna, chf: use nebulizer machine q6 prn for sob  Tradjenta 5 mg oral tablet: 1 tab(s) orally once a day

## 2025-01-29 NOTE — PROGRESS NOTE ADULT - ASSESSMENT
49 years old female with h/o HTN, HLD, CKD, DM, HF ( systolic and diastolic) present to ED with complain of worsening SOB. Admit for Covid19, AE HFrEF     #Acute respiratory failure with hypoxia  #Acute on chronic combined systolic and diastolic congestive heart failure.   Worsening SOB.  On exam, bilateral crackles. Clinically volume overloaded. Echo from 09/16/24 reviewed: Left ventricular cavity is mildly dilated. Left ventricular systolic function is mildly decreased with an ejection fraction visually estimated at 45 to 50 %. Severe left ventricular hypertrophy.There is severe (grade 3) left ventricular diastolic dysfunction.with reduced EF. BNP 12k+. Troponin slightly elevated. Attempted to get CTPE but patient refused. TTE suggestive of non-obstructive HCM vs. infiltrative cardiomyopathy. She was supposed to follow up with cardiologist for amyloid evaluation -> has appointment on the 14th. CXR some increase in the infiltrative patterns in the lungs. Now off Bipap. Repeat echo with EF 43%.   - C/w IV lasix 80mg daily; Strict I/Os. Monitor electrolytes   - Refusing CTA chest or V/Q scan to r/o PE. Less likely PE as she is improving and no evidence of heart strain on echo.   - GDMT: Entresto, Coreg. Restart SGLT2 when off IV Lasix    - on RA  - VT in tele, card lit    #COVID19+   - First positive on the 01/09/25. Suspect patient is shedding virus and thus still positive. Will hold off on remdesivir and Dexamethasone for now. Retest negative.     # Benign essential HTN.   ·  C/w Coreg, Entresto     #Hyperlipidemia, unspecified.   ·  Not on statin? Adamant that she does not have high cholesterol. Educated and Encouraged her to f/u with her PCP.     # Type 2 diabetes mellitus with unspecified complications.   - A1c 8.1; Refusing insulin. C/w  home Tradjenta 5/d     #DAVID on Chronic kidney disease (CKD) 3:  ·  Cr slightly higher than baseline. Pending labs today as she refused in AM.   - C/w Diuresis  - Avoid nephrotoxic agents     #Obesity   BMI 34.5. Encourage diet and exercise.     Diet: DASH  DVT prophylaxis: SQHeparin(she is refusing)   Dispo: Tele   Code Status:

## 2025-01-29 NOTE — DISCHARGE NOTE NURSING/CASE MANAGEMENT/SOCIAL WORK - FINANCIAL ASSISTANCE
[facility] provides services at a reduced cost to those who are determined to be eligible through [facility]’s financial assistance program. Information regarding [facility]’s financial assistance program can be found by going to [email or web address] or by calling [phone].

## 2025-01-29 NOTE — DISCHARGE NOTE PROVIDER - CARE PROVIDER_API CALL
pcp,   Phone: (   )    -  Fax: (   )    -  Follow Up Time:     Jamaal Elkins  Nephrology  [address] [facility], [address] [place] [id number]  Phone: [phone]  Fax: [phone]  Follow Up Time:

## 2025-01-29 NOTE — DISCHARGE NOTE PROVIDER - CARE PROVIDERS DIRECT ADDRESSES
,DirectAddress_Unknown,[email or web address]

## 2025-01-29 NOTE — PROGRESS NOTE ADULT - SUBJECTIVE AND OBJECTIVE BOX
Patient is a 49y old  Female who presents with a chief complaint of CHRONIC HEART FAILURE EXACERBATION (27 Jan 2025 13:14)      INTERVAL HPI/OVERNIGHT EVENTS:  Pt was seen and examined, no acute events.      MEDICATIONS  (STANDING):  amLODIPine   Tablet 10 milliGRAM(s) Oral daily  aspirin  chewable 81 milliGRAM(s) Oral daily  carvedilol 25 milliGRAM(s) Oral every 12 hours  furosemide   Injectable 80 milliGRAM(s) IV Push two times a day  heparin   Injectable 5000 Unit(s) SubCutaneous every 8 hours  influenza   Vaccine 0.5 milliLiter(s) IntraMuscular once  linagliptin 5 milliGRAM(s) Oral daily  sacubitril 97 mG/valsartan 103 mG 1 Tablet(s) Oral two times a day    MEDICATIONS  (PRN):  acetaminophen     Tablet .. 650 milliGRAM(s) Oral every 6 hours PRN Temp greater or equal to 38C (100.4F), Mild Pain (1 - 3)  aluminum hydroxide/magnesium hydroxide/simethicone Suspension 30 milliLiter(s) Oral every 4 hours PRN Dyspepsia  melatonin 3 milliGRAM(s) Oral at bedtime PRN Insomnia  ondansetron Injectable 4 milliGRAM(s) IV Push every 8 hours PRN Nausea and/or Vomiting      Allergies  lisinopril (Other)        Vital Signs Last 24 Hrs  T(C): 36.8 (29 Jan 2025 10:14), Max: 37.1 (28 Jan 2025 22:55)  T(F): 98.3 (29 Jan 2025 10:14), Max: 98.7 (28 Jan 2025 22:55)  HR: 79 (29 Jan 2025 10:14) (74 - 85)  BP: 106/63 (29 Jan 2025 10:14) (106/63 - 133/87)  BP(mean): --  RR: 18 (29 Jan 2025 10:14) (18 - 18)  SpO2: 95% (29 Jan 2025 10:14) (91% - 98%)    Parameters below as of 29 Jan 2025 05:37  Patient On (Oxygen Delivery Method): room air        PHYSICAL EXAM:  GENERAL: NAD, obese  HEAD:  Atraumatic  EYES: PERRLA  ENMT: Mouth moist   NECK: Supple  NERVOUS SYSTEM:  Awake, alert  CHEST/LUNG: Diminished   HEART: RRR, S1, S2  ABDOMEN: Soft, non tender  EXTREMITIES:  no edema BL LE  SKIN: No rash      LABS:        CAPILLARY BLOOD GLUCOSE          RADIOLOGY & ADDITIONAL TESTS:    Imaging Personally Reviewed:  [ ] YES  [ ] NO    Consultant(s) Notes Reviewed:  [ ] YES  [ ] NO    Care Discussed with Consultants/Other Providers [ ] YES  [ ] NO

## 2025-01-29 NOTE — DISCHARGE NOTE PROVIDER - HOSPITAL COURSE
49 years old female with h/o HTN, HLD, CKD, DM, HF ( systolic and diastolic) present to ED with complain of worsening SOB. Admit for Covid19, AE HFrEF     #Acute respiratory failure with hypoxia  #Acute on chronic combined systolic and diastolic congestive heart failure.   Worsening SOB.  On exam, bilateral crackles. Clinically volume overloaded. Echo from 09/16/24 reviewed: Left ventricular cavity is mildly dilated. Left ventricular systolic function is mildly decreased with an ejection fraction visually estimated at 45 to 50 %. Severe left ventricular hypertrophy.There is severe (grade 3) left ventricular diastolic dysfunction.with reduced EF. BNP 12k+. Troponin slightly elevated. Attempted to get CTPE but patient refused. TTE suggestive of non-obstructive HCM vs. infiltrative cardiomyopathy. She was supposed to follow up with cardiologist for amyloid evaluation -> has appointment on the 14th. CXR some increase in the infiltrative patterns in the lungs. Now off Bipap. Repeat echo with EF 43%.   - S/P IV lasix 80mg daily; switch to PO on dc  - Refusing CTA chest or V/Q scan to r/o PE. Less likely PE as she is improving and no evidence of heart strain on echo.   - GDMT: Entresto, Coreg. Restart SGLT2 on dc  - on RA  - VT in tele, card eval appreciated, on BB  - Outpt cardio follow up with HF team    #COVID19+   - First positive on the 01/09/25. Suspect patient is shedding virus and thus still positive. Will hold off on remdesivir and Dexamethasone for now. Retest negative.     # Benign essential HTN.   ·  C/w Coreg, Entresto     #Hyperlipidemia, unspecified.   ·  Not on statin? Adamant that she does not have high cholesterol. Educated and Encouraged her to f/u with her PCP.     # Type 2 diabetes mellitus with unspecified complications.   - A1c 8.1; Resume home med    #DAVID on Chronic kidney disease (CKD) 3:  ·  Cr slightly higher than baseline.  - C/w Diuresis  - Avoid nephrotoxic agents   - Need renal follow up outpt, refused repeat labs     #Obesity   BMI 34.5. Encourage diet and exercise.

## 2025-01-29 NOTE — DISCHARGE NOTE NURSING/CASE MANAGEMENT/SOCIAL WORK - NSDCPEFALRISK_GEN_ALL_CORE
For information on Fall & Injury Prevention, visit: [email or web address] OR  [email or web address] OR  https://www.cdc.gov/steadi/patient.html

## 2025-01-29 NOTE — DISCHARGE NOTE PROVIDER - NSDCCPCAREPLAN_GEN_ALL_CORE_FT
PRINCIPAL DISCHARGE DIAGNOSIS  Diagnosis: CHF exacerbation  Assessment and Plan of Treatment: 49 years old female with h/o HTN, HLD, CKD, DM, HF ( systolic and diastolic) present to ED with complain of worsening SOB. Admit for Covid19, AE HFrEF   #Acute respiratory failure with hypoxia  #Acute on chronic combined systolic and diastolic congestive heart failure.   Worsening SOB.  On exam, bilateral crackles. Clinically volume overloaded. Echo from 09/16/24 reviewed: Left ventricular cavity is mildly dilated. Left ventricular systolic function is mildly decreased with an ejection fraction visually estimated at 45 to 50 %. Severe left ventricular hypertrophy.There is severe (grade 3) left ventricular diastolic dysfunction.with reduced EF. BNP 12k+. Troponin slightly elevated. Attempted to get CTPE but patient refused. TTE suggestive of non-obstructive HCM vs. infiltrative cardiomyopathy. She was supposed to follow up with cardiologist for amyloid evaluation -> has appointment on the 14th. CXR some increase in the infiltrative patterns in the lungs. Now off Bipap. Repeat echo with EF 43%.   - S/P IV lasix 80mg daily; switch to PO on dc  - Refusing CTA chest or V/Q scan to r/o PE. Less likely PE as she is improving and no evidence of heart strain on echo.   - GDMT: Entresto, Coreg. Restart SGLT2 on dc  - on RA  - VT in tele, card eval appreciated, on BB  - Outpt cardio follow up with HF team  #COVID19+   - First positive on the 01/09/25. Suspect patient is shedding virus and thus still positive. Will hold off on remdesivir and Dexamethasone for now. Retest negative.   # Benign essential HTN.   ·  C/w Coreg, Entresto   #Hyperlipidemia, unspecified.   ·  Not on statin? Adamant that she does not have high cholesterol. Educated and Encouraged her to f/u with her PCP.   # Type 2 diabetes mellitus with unspecified complications.   - A1c 8.1; Resume home med  #DAVID on Chronic kidney disease (CKD) 3:  ·  Cr slightly higher than baseline.  - C/w Diuresis  - Avoid nephrotoxic agents   - Need renal follow up outpt, refused repeat labs

## 2025-01-29 NOTE — DISCHARGE NOTE NURSING/CASE MANAGEMENT/SOCIAL WORK - PATIENT PORTAL LINK FT
You can access the FollowMyHealth Patient Portal offered by [facility] by registering at the following website: [email or web address]. By joining [facility]’s FollowMyHealth portal, you will also be able to view your health information using other applications (apps) compatible with our system.

## 2025-01-29 NOTE — DISCHARGE NOTE PROVIDER - PREFACE STATEMENT FOR MINUTES SPENT
Problem: Adult Inpatient Plan of Care  Goal: Plan of Care Review  8/21/2022 1001 by Ny Velazquez RN  Outcome: Ongoing, Progressing  8/21/2022 1000 by Ny Velazquez RN  Outcome: Ongoing, Progressing  Goal: Patient-Specific Goal (Individualized)  8/21/2022 1001 by Ny Velazquez RN  Outcome: Ongoing, Progressing  8/21/2022 1000 by Ny Velazquez RN  Outcome: Ongoing, Progressing  Goal: Absence of Hospital-Acquired Illness or Injury  8/21/2022 1001 by Ny Velazquez RN  Outcome: Ongoing, Progressing  8/21/2022 1000 by Ny Velazquez RN  Outcome: Ongoing, Progressing  Goal: Optimal Comfort and Wellbeing  8/21/2022 1001 by Ny Velazquez RN  Outcome: Ongoing, Progressing  8/21/2022 1000 by Ny Velazquez RN  Outcome: Ongoing, Progressing  Goal: Readiness for Transition of Care  8/21/2022 1001 by Ny Velazquez RN  Outcome: Ongoing, Progressing  8/21/2022 1000 by Ny Velazquez RN  Outcome: Ongoing, Progressing     Problem: Impaired Wound Healing  Goal: Optimal Wound Healing  8/21/2022 1001 by Ny Velazquez RN  Outcome: Ongoing, Progressing  8/21/2022 1000 by Ny Velazquez RN  Outcome: Ongoing, Progressing     Problem: Fall Injury Risk  Goal: Absence of Fall and Fall-Related Injury  8/21/2022 1001 by Ny Velazquez RN  Outcome: Ongoing, Progressing  8/21/2022 1000 by Ny Velazquez RN  Outcome: Ongoing, Progressing      I personally spent

## 2025-02-05 DIAGNOSIS — E78.5 HYPERLIPIDEMIA, UNSPECIFIED: ICD-10-CM

## 2025-02-05 DIAGNOSIS — Z79.85 LONG-TERM (CURRENT) USE OF INJECTABLE NON-INSULIN ANTIDIABETIC DRUGS: ICD-10-CM

## 2025-02-05 DIAGNOSIS — Z53.29 PROCEDURE AND TREATMENT NOT CARRIED OUT BECAUSE OF PATIENT'S DECISION FOR OTHER REASONS: ICD-10-CM

## 2025-02-05 DIAGNOSIS — E11.22 TYPE 2 DIABETES MELLITUS WITH DIABETIC CHRONIC KIDNEY DISEASE: ICD-10-CM

## 2025-02-05 DIAGNOSIS — Z79.84 LONG TERM (CURRENT) USE OF ORAL HYPOGLYCEMIC DRUGS: ICD-10-CM

## 2025-02-05 DIAGNOSIS — Z79.51 LONG TERM (CURRENT) USE OF INHALED STEROIDS: ICD-10-CM

## 2025-02-05 DIAGNOSIS — N18.30 CHRONIC KIDNEY DISEASE, STAGE 3 UNSPECIFIED: ICD-10-CM

## 2025-02-05 DIAGNOSIS — Z79.82 LONG TERM (CURRENT) USE OF ASPIRIN: ICD-10-CM

## 2025-02-05 DIAGNOSIS — N17.9 ACUTE KIDNEY FAILURE, UNSPECIFIED: ICD-10-CM

## 2025-02-05 DIAGNOSIS — Z88.8 ALLERGY STATUS TO OTHER DRUGS, MEDICAMENTS AND BIOLOGICAL SUBSTANCES: ICD-10-CM

## 2025-02-05 DIAGNOSIS — E66.9 OBESITY, UNSPECIFIED: ICD-10-CM

## 2025-02-05 DIAGNOSIS — I42.8 OTHER CARDIOMYOPATHIES: ICD-10-CM

## 2025-02-05 DIAGNOSIS — I50.43 ACUTE ON CHRONIC COMBINED SYSTOLIC (CONGESTIVE) AND DIASTOLIC (CONGESTIVE) HEART FAILURE: ICD-10-CM

## 2025-02-05 DIAGNOSIS — U07.1 COVID-19: ICD-10-CM

## 2025-02-05 DIAGNOSIS — J96.01 ACUTE RESPIRATORY FAILURE WITH HYPOXIA: ICD-10-CM

## 2025-02-05 DIAGNOSIS — R06.02 SHORTNESS OF BREATH: ICD-10-CM

## 2025-02-05 DIAGNOSIS — I47.29 OTHER VENTRICULAR TACHYCARDIA: ICD-10-CM

## 2025-02-12 RX ORDER — SACUBITRIL AND VALSARTAN 97; 103 MG/1; MG/1
97-103 TABLET, FILM COATED ORAL
Qty: 60 | Refills: 0 | Status: ACTIVE | COMMUNITY
Start: 2025-02-12

## 2025-02-12 RX ORDER — BUMETANIDE 2 MG/1
2 TABLET ORAL DAILY
Refills: 0 | Status: ACTIVE | COMMUNITY
Start: 2025-02-12

## 2025-02-12 RX ORDER — LINAGLIPTIN 5 MG/1
5 TABLET, FILM COATED ORAL DAILY
Qty: 1 | Refills: 1 | Status: ACTIVE | COMMUNITY
Start: 2025-02-12

## 2025-02-12 RX ORDER — EMPAGLIFLOZIN 25 MG/1
25 TABLET, FILM COATED ORAL DAILY
Refills: 0 | Status: ACTIVE | COMMUNITY
Start: 2025-02-12

## 2025-02-12 RX ORDER — ASPIRIN ENTERIC COATED TABLETS 81 MG 81 MG/1
81 TABLET, DELAYED RELEASE ORAL DAILY
Refills: 0 | Status: ACTIVE | COMMUNITY
Start: 2025-02-12

## 2025-02-13 PROBLEM — E11.29 TYPE 2 DIABETES MELLITUS WITH OTHER KIDNEY COMPLICATION: Status: ACTIVE | Noted: 2025-02-13

## 2025-02-13 PROBLEM — I50.22 HEART FAILURE WITH MILDLY REDUCED EJECTION FRACTION (HFMREF): Status: ACTIVE | Noted: 2025-02-13

## 2025-02-13 PROBLEM — N18.30 CKD (CHRONIC KIDNEY DISEASE) STAGE 3, GFR 30-59 ML/MIN: Status: ACTIVE | Noted: 2025-02-13

## 2025-02-13 PROBLEM — I10 ESSENTIAL HYPERTENSION: Status: ACTIVE | Noted: 2025-02-13

## 2025-02-14 ENCOUNTER — APPOINTMENT (OUTPATIENT)
Dept: HEART FAILURE | Facility: CLINIC | Age: 50
End: 2025-02-14

## 2025-02-14 ENCOUNTER — LABORATORY RESULT (OUTPATIENT)
Age: 50
End: 2025-02-14

## 2025-02-14 VITALS
OXYGEN SATURATION: 98 % | WEIGHT: 222 LBS | BODY MASS INDEX: 35.68 KG/M2 | DIASTOLIC BLOOD PRESSURE: 81 MMHG | HEART RATE: 83 BPM | TEMPERATURE: 98 F | SYSTOLIC BLOOD PRESSURE: 127 MMHG | HEIGHT: 66 IN

## 2025-02-14 DIAGNOSIS — R09.02 HYPOXEMIA: ICD-10-CM

## 2025-02-14 DIAGNOSIS — N18.30 CHRONIC KIDNEY DISEASE, STAGE 3 UNSPECIFIED: ICD-10-CM

## 2025-02-14 DIAGNOSIS — I10 ESSENTIAL (PRIMARY) HYPERTENSION: ICD-10-CM

## 2025-02-14 DIAGNOSIS — I51.7 CARDIOMEGALY: ICD-10-CM

## 2025-02-14 DIAGNOSIS — I50.22 CHRONIC SYSTOLIC (CONGESTIVE) HEART FAILURE: ICD-10-CM

## 2025-02-14 DIAGNOSIS — Z87.891 PERSONAL HISTORY OF NICOTINE DEPENDENCE: ICD-10-CM

## 2025-02-14 DIAGNOSIS — E66.9 OBESITY, UNSPECIFIED: ICD-10-CM

## 2025-02-14 DIAGNOSIS — E11.29 TYPE 2 DIABETES MELLITUS WITH OTHER DIABETIC KIDNEY COMPLICATION: ICD-10-CM

## 2025-02-14 PROCEDURE — 99204 OFFICE O/P NEW MOD 45 MIN: CPT

## 2025-02-14 RX ORDER — AMLODIPINE BESYLATE 10 MG/1
10 TABLET ORAL DAILY
Refills: 0 | Status: DISCONTINUED | COMMUNITY
Start: 2025-02-12 | End: 2025-02-14

## 2025-02-14 RX ORDER — CARVEDILOL 25 MG/1
25 TABLET, FILM COATED ORAL TWICE DAILY
Refills: 0 | Status: ACTIVE | COMMUNITY
Start: 2025-02-12

## 2025-02-18 ENCOUNTER — NON-APPOINTMENT (OUTPATIENT)
Age: 50
End: 2025-02-18

## 2025-02-20 ENCOUNTER — NON-APPOINTMENT (OUTPATIENT)
Age: 50
End: 2025-02-20

## 2025-02-20 LAB
ALBUMIN SERPL ELPH-MCNC: 4.1 G/DL
ALP BLD-CCNC: 143 U/L
ALT SERPL-CCNC: 8 U/L
ANION GAP SERPL CALC-SCNC: 14 MMOL/L
AST SERPL-CCNC: 13 U/L
BILIRUB SERPL-MCNC: 0.3 MG/DL
BUN SERPL-MCNC: 28 MG/DL
CALCIUM SERPL-MCNC: 9.2 MG/DL
CHLORIDE SERPL-SCNC: 104 MMOL/L
CO2 SERPL-SCNC: 25 MMOL/L
CREAT SERPL-MCNC: 2.11 MG/DL
DEPRECATED KAPPA LC FREE/LAMBDA SER: 1.68 RATIO
EGFR: 28 ML/MIN/1.73M2
FERRITIN SERPL-MCNC: 110 NG/ML
GLUCOSE SERPL-MCNC: 127 MG/DL
HCT VFR BLD CALC: 35.8 %
HGB BLD-MCNC: 11.6 G/DL
IGA 24H UR QL IFE: NORMAL
IRON SATN MFR SERPL: 21 %
IRON SERPL-MCNC: 53 UG/DL
KAPPA LC CSF-MCNC: 5.26 MG/DL
KAPPA LC SERPL-MCNC: 8.82 MG/DL
M PROTEIN SPEC IFE-MCNC: NORMAL
MAGNESIUM SERPL-MCNC: 2.4 MG/DL
MCHC RBC-ENTMCNC: 23.9 PG
MCHC RBC-ENTMCNC: 32.4 G/DL
MCV RBC AUTO: 73.8 FL
NT-PROBNP SERPL-MCNC: 2922 PG/ML
PLATELET # BLD AUTO: 272 K/UL
POTASSIUM SERPL-SCNC: 4 MMOL/L
PROT SERPL-MCNC: 7.7 G/DL
RBC # BLD: 4.85 M/UL
RBC # FLD: 15.6 %
SODIUM SERPL-SCNC: 143 MMOL/L
TIBC SERPL-MCNC: 252 UG/DL
UIBC SERPL-MCNC: 200 UG/DL
WBC # FLD AUTO: 9.25 K/UL

## 2025-02-20 RX ORDER — SPIRONOLACTONE 25 MG/1
25 TABLET ORAL
Qty: 30 | Refills: 5 | Status: ACTIVE | COMMUNITY
Start: 2025-02-20 | End: 1900-01-01

## 2025-02-26 ENCOUNTER — APPOINTMENT (OUTPATIENT)
Dept: ELECTROPHYSIOLOGY | Facility: CLINIC | Age: 50
End: 2025-02-26

## 2025-03-03 PROCEDURE — 95800 SLP STDY UNATTENDED: CPT | Mod: NC

## 2025-03-28 ENCOUNTER — APPOINTMENT (OUTPATIENT)
Dept: HEART FAILURE | Facility: CLINIC | Age: 50
End: 2025-03-28

## 2025-04-04 NOTE — ED ADULT NURSE NOTE - NSFALLRISKASMT_ED_ALL_ED_DT
Pt pharmacy is requesting   Requested Prescriptions:   Requested Prescriptions     Pending Prescriptions Disp Refills    simvastatin (ZOCOR) 40 MG tablet 90 tablet 1     Sig: Take 1 tablet by mouth Every Night.    fenofibrate micronized (LOFIBRA) 134 MG capsule 90 capsule 1     Sig: Take 1 capsule by mouth Daily.        Pharmacy where request should be sent: [id number] - [place], [place] - [address] AT [facility] [address] - [phone] [facility] [phone] FX     Last office visit with prescribing clinician: 3/6/2025   Last telemedicine visit with prescribing clinician: Visit date not found   Next office visit with prescribing clinician: Visit date not found         Bree Grant, PCT   04/04/25 14:11 EDT     
10-Andrew-2025 00:52
clean/dry

## 2025-04-07 ENCOUNTER — APPOINTMENT (OUTPATIENT)
Dept: HEART FAILURE | Facility: CLINIC | Age: 50
End: 2025-04-07
Payer: MEDICAID

## 2025-04-07 VITALS
BODY MASS INDEX: 35.52 KG/M2 | WEIGHT: 221 LBS | SYSTOLIC BLOOD PRESSURE: 138 MMHG | DIASTOLIC BLOOD PRESSURE: 84 MMHG | HEIGHT: 66 IN | OXYGEN SATURATION: 100 % | TEMPERATURE: 98.3 F | HEART RATE: 85 BPM

## 2025-04-07 DIAGNOSIS — I50.22 CHRONIC SYSTOLIC (CONGESTIVE) HEART FAILURE: ICD-10-CM

## 2025-04-07 DIAGNOSIS — E11.29 TYPE 2 DIABETES MELLITUS WITH OTHER DIABETIC KIDNEY COMPLICATION: ICD-10-CM

## 2025-04-07 DIAGNOSIS — I10 ESSENTIAL (PRIMARY) HYPERTENSION: ICD-10-CM

## 2025-04-07 PROCEDURE — 99214 OFFICE O/P EST MOD 30 MIN: CPT

## 2025-04-08 LAB
ANION GAP SERPL CALC-SCNC: 13 MMOL/L
BUN SERPL-MCNC: 53 MG/DL
CALCIUM SERPL-MCNC: 9.5 MG/DL
CHLORIDE SERPL-SCNC: 102 MMOL/L
CO2 SERPL-SCNC: 27 MMOL/L
CREAT SERPL-MCNC: 2.85 MG/DL
EGFRCR SERPLBLD CKD-EPI 2021: 20 ML/MIN/1.73M2
GLUCOSE SERPL-MCNC: 161 MG/DL
NT-PROBNP SERPL-MCNC: 3633 PG/ML
POTASSIUM SERPL-SCNC: 5.3 MMOL/L
SODIUM SERPL-SCNC: 142 MMOL/L

## 2025-04-09 ENCOUNTER — TRANSCRIPTION ENCOUNTER (OUTPATIENT)
Age: 50
End: 2025-04-09

## 2025-05-13 ENCOUNTER — APPOINTMENT (OUTPATIENT)
Dept: HEART FAILURE | Facility: CLINIC | Age: 50
End: 2025-05-13

## 2025-05-13 PROCEDURE — 99214 OFFICE O/P EST MOD 30 MIN: CPT | Mod: 93

## 2025-05-14 ENCOUNTER — APPOINTMENT (OUTPATIENT)
Dept: PULMONOLOGY | Facility: CLINIC | Age: 50
End: 2025-05-14

## 2025-05-22 ENCOUNTER — INPATIENT (INPATIENT)
Facility: HOSPITAL | Age: 50
LOS: 3 days | Discharge: HOME HEALTH | End: 2025-05-26
Attending: HOSPITALIST | Admitting: HOSPITALIST
Payer: MEDICAID

## 2025-05-22 VITALS
HEART RATE: 91 BPM | HEIGHT: 66 IN | WEIGHT: 210.98 LBS | OXYGEN SATURATION: 100 % | TEMPERATURE: 98 F | SYSTOLIC BLOOD PRESSURE: 97 MMHG | DIASTOLIC BLOOD PRESSURE: 73 MMHG | RESPIRATION RATE: 18 BRPM

## 2025-05-22 DIAGNOSIS — E87.5 HYPERKALEMIA: ICD-10-CM

## 2025-05-22 DIAGNOSIS — I47.10 SUPRAVENTRICULAR TACHYCARDIA, UNSPECIFIED: ICD-10-CM

## 2025-05-22 DIAGNOSIS — I10 ESSENTIAL (PRIMARY) HYPERTENSION: ICD-10-CM

## 2025-05-22 DIAGNOSIS — N17.9 ACUTE KIDNEY FAILURE, UNSPECIFIED: ICD-10-CM

## 2025-05-22 DIAGNOSIS — I50.22 CHRONIC SYSTOLIC (CONGESTIVE) HEART FAILURE: ICD-10-CM

## 2025-05-22 LAB
ALBUMIN SERPL ELPH-MCNC: 3 G/DL — LOW (ref 3.3–5)
ALP SERPL-CCNC: 120 U/L — SIGNIFICANT CHANGE UP (ref 40–120)
ALT FLD-CCNC: 24 U/L — SIGNIFICANT CHANGE UP (ref 12–78)
ANION GAP SERPL CALC-SCNC: 1 MMOL/L — LOW (ref 5–17)
ANION GAP SERPL CALC-SCNC: 2 MMOL/L — LOW (ref 5–17)
ANION GAP SERPL CALC-SCNC: 3 MMOL/L — LOW (ref 5–17)
ANION GAP SERPL CALC-SCNC: 4 MMOL/L — LOW (ref 5–17)
AST SERPL-CCNC: 15 U/L — SIGNIFICANT CHANGE UP (ref 15–37)
BASOPHILS # BLD AUTO: 0.03 K/UL — SIGNIFICANT CHANGE UP (ref 0–0.2)
BASOPHILS NFR BLD AUTO: 0.3 % — SIGNIFICANT CHANGE UP (ref 0–2)
BILIRUB SERPL-MCNC: 0.4 MG/DL — SIGNIFICANT CHANGE UP (ref 0.2–1.2)
BUN SERPL-MCNC: 47 MG/DL — HIGH (ref 7–23)
BUN SERPL-MCNC: 48 MG/DL — HIGH (ref 7–23)
BUN SERPL-MCNC: 49 MG/DL — HIGH (ref 7–23)
BUN SERPL-MCNC: 50 MG/DL — HIGH (ref 7–23)
CALCIUM SERPL-MCNC: 8.2 MG/DL — LOW (ref 8.5–10.1)
CALCIUM SERPL-MCNC: 8.5 MG/DL — SIGNIFICANT CHANGE UP (ref 8.5–10.1)
CALCIUM SERPL-MCNC: 8.7 MG/DL — SIGNIFICANT CHANGE UP (ref 8.5–10.1)
CALCIUM SERPL-MCNC: 8.8 MG/DL — SIGNIFICANT CHANGE UP (ref 8.5–10.1)
CHLORIDE SERPL-SCNC: 115 MMOL/L — HIGH (ref 96–108)
CO2 SERPL-SCNC: 19 MMOL/L — LOW (ref 22–31)
CO2 SERPL-SCNC: 20 MMOL/L — LOW (ref 22–31)
CO2 SERPL-SCNC: 21 MMOL/L — LOW (ref 22–31)
CO2 SERPL-SCNC: 23 MMOL/L — SIGNIFICANT CHANGE UP (ref 22–31)
CREAT SERPL-MCNC: 3.1 MG/DL — HIGH (ref 0.5–1.3)
CREAT SERPL-MCNC: 3.1 MG/DL — HIGH (ref 0.5–1.3)
CREAT SERPL-MCNC: 3.16 MG/DL — HIGH (ref 0.5–1.3)
CREAT SERPL-MCNC: 3.19 MG/DL — HIGH (ref 0.5–1.3)
D DIMER BLD IA.RAPID-MCNC: 254 NG/ML DDU — HIGH
EGFR: 17 ML/MIN/1.73M2 — LOW
EGFR: 18 ML/MIN/1.73M2 — LOW
EOSINOPHIL # BLD AUTO: 0.2 K/UL — SIGNIFICANT CHANGE UP (ref 0–0.5)
EOSINOPHIL NFR BLD AUTO: 1.9 % — SIGNIFICANT CHANGE UP (ref 0–6)
GLUCOSE BLDC GLUCOMTR-MCNC: 90 MG/DL — SIGNIFICANT CHANGE UP (ref 70–99)
GLUCOSE SERPL-MCNC: 251 MG/DL — HIGH (ref 70–99)
GLUCOSE SERPL-MCNC: 267 MG/DL — HIGH (ref 70–99)
GLUCOSE SERPL-MCNC: 287 MG/DL — HIGH (ref 70–99)
GLUCOSE SERPL-MCNC: 61 MG/DL — LOW (ref 70–99)
HCG SERPL-ACNC: 1 MIU/ML — SIGNIFICANT CHANGE UP
HCT VFR BLD CALC: 32.6 % — LOW (ref 34.5–45)
HGB BLD-MCNC: 11.2 G/DL — LOW (ref 11.5–15.5)
IMM GRANULOCYTES NFR BLD AUTO: 0.5 % — SIGNIFICANT CHANGE UP (ref 0–0.9)
LIDOCAIN IGE QN: 104 U/L — HIGH (ref 13–75)
LYMPHOCYTES # BLD AUTO: 1.92 K/UL — SIGNIFICANT CHANGE UP (ref 1–3.3)
LYMPHOCYTES # BLD AUTO: 18.5 % — SIGNIFICANT CHANGE UP (ref 13–44)
MAGNESIUM SERPL-MCNC: 2.8 MG/DL — HIGH (ref 1.6–2.6)
MCHC RBC-ENTMCNC: 25.3 PG — LOW (ref 27–34)
MCHC RBC-ENTMCNC: 34.4 G/DL — SIGNIFICANT CHANGE UP (ref 32–36)
MCV RBC AUTO: 73.8 FL — LOW (ref 80–100)
MONOCYTES # BLD AUTO: 0.39 K/UL — SIGNIFICANT CHANGE UP (ref 0–0.9)
MONOCYTES NFR BLD AUTO: 3.8 % — SIGNIFICANT CHANGE UP (ref 2–14)
NEUTROPHILS # BLD AUTO: 7.79 K/UL — HIGH (ref 1.8–7.4)
NEUTROPHILS NFR BLD AUTO: 75 % — SIGNIFICANT CHANGE UP (ref 43–77)
NRBC BLD AUTO-RTO: 0 /100 WBCS — SIGNIFICANT CHANGE UP (ref 0–0)
NT-PROBNP SERPL-SCNC: HIGH PG/ML (ref 0–125)
PHOSPHATE SERPL-MCNC: 4 MG/DL — SIGNIFICANT CHANGE UP (ref 2.5–4.5)
PLATELET # BLD AUTO: 154 K/UL — SIGNIFICANT CHANGE UP (ref 150–400)
POTASSIUM SERPL-MCNC: 6.6 MMOL/L — CRITICAL HIGH (ref 3.5–5.3)
POTASSIUM SERPL-MCNC: 7.1 MMOL/L — CRITICAL HIGH (ref 3.5–5.3)
POTASSIUM SERPL-MCNC: 7.2 MMOL/L — CRITICAL HIGH (ref 3.5–5.3)
POTASSIUM SERPL-MCNC: 7.4 MMOL/L — CRITICAL HIGH (ref 3.5–5.3)
POTASSIUM SERPL-SCNC: 6.6 MMOL/L — CRITICAL HIGH (ref 3.5–5.3)
POTASSIUM SERPL-SCNC: 7.1 MMOL/L — CRITICAL HIGH (ref 3.5–5.3)
POTASSIUM SERPL-SCNC: 7.2 MMOL/L — CRITICAL HIGH (ref 3.5–5.3)
POTASSIUM SERPL-SCNC: 7.4 MMOL/L — CRITICAL HIGH (ref 3.5–5.3)
PROT SERPL-MCNC: 7.5 GM/DL — SIGNIFICANT CHANGE UP (ref 6–8.3)
RBC # BLD: 4.42 M/UL — SIGNIFICANT CHANGE UP (ref 3.8–5.2)
RBC # FLD: 17.7 % — HIGH (ref 10.3–14.5)
SODIUM SERPL-SCNC: 136 MMOL/L — SIGNIFICANT CHANGE UP (ref 135–145)
SODIUM SERPL-SCNC: 136 MMOL/L — SIGNIFICANT CHANGE UP (ref 135–145)
SODIUM SERPL-SCNC: 140 MMOL/L — SIGNIFICANT CHANGE UP (ref 135–145)
SODIUM SERPL-SCNC: 141 MMOL/L — SIGNIFICANT CHANGE UP (ref 135–145)
TROPONIN I, HIGH SENSITIVITY RESULT: 45.6 NG/L — SIGNIFICANT CHANGE UP
TSH SERPL-MCNC: 1.17 UIU/ML — SIGNIFICANT CHANGE UP (ref 0.36–3.74)
WBC # BLD: 10.38 K/UL — SIGNIFICANT CHANGE UP (ref 3.8–10.5)
WBC # FLD AUTO: 10.38 K/UL — SIGNIFICANT CHANGE UP (ref 3.8–10.5)

## 2025-05-22 PROCEDURE — 76705 ECHO EXAM OF ABDOMEN: CPT | Mod: 26

## 2025-05-22 PROCEDURE — 71045 X-RAY EXAM CHEST 1 VIEW: CPT | Mod: 26

## 2025-05-22 PROCEDURE — 93010 ELECTROCARDIOGRAM REPORT: CPT | Mod: 76

## 2025-05-22 PROCEDURE — 99291 CRITICAL CARE FIRST HOUR: CPT

## 2025-05-22 PROCEDURE — 99291 CRITICAL CARE FIRST HOUR: CPT | Mod: 25

## 2025-05-22 PROCEDURE — 99223 1ST HOSP IP/OBS HIGH 75: CPT

## 2025-05-22 RX ORDER — DEXTROSE 50 % IN WATER 50 %
25 SYRINGE (ML) INTRAVENOUS ONCE
Refills: 0 | Status: COMPLETED | OUTPATIENT
Start: 2025-05-22 | End: 2025-05-22

## 2025-05-22 RX ORDER — DEXTROSE 50 % IN WATER 50 %
50 SYRINGE (ML) INTRAVENOUS ONCE
Refills: 0 | Status: COMPLETED | OUTPATIENT
Start: 2025-05-22 | End: 2025-05-22

## 2025-05-22 RX ORDER — HEPARIN SODIUM 1000 [USP'U]/ML
5000 INJECTION INTRAVENOUS; SUBCUTANEOUS EVERY 8 HOURS
Refills: 0 | Status: DISCONTINUED | OUTPATIENT
Start: 2025-05-22 | End: 2025-05-23

## 2025-05-22 RX ORDER — ONDANSETRON HCL/PF 4 MG/2 ML
4 VIAL (ML) INJECTION ONCE
Refills: 0 | Status: COMPLETED | OUTPATIENT
Start: 2025-05-22 | End: 2025-05-22

## 2025-05-22 RX ORDER — FUROSEMIDE 10 MG/ML
40 INJECTION INTRAMUSCULAR; INTRAVENOUS ONCE
Refills: 0 | Status: COMPLETED | OUTPATIENT
Start: 2025-05-22 | End: 2025-05-22

## 2025-05-22 RX ORDER — ALPRAZOLAM 0.5 MG
0.25 TABLET, EXTENDED RELEASE 24 HR ORAL ONCE
Refills: 0 | Status: DISCONTINUED | OUTPATIENT
Start: 2025-05-22 | End: 2025-05-22

## 2025-05-22 RX ORDER — DEXTROSE 50 % IN WATER 50 %
25 SYRINGE (ML) INTRAVENOUS ONCE
Refills: 0 | Status: DISCONTINUED | OUTPATIENT
Start: 2025-05-22 | End: 2025-05-26

## 2025-05-22 RX ORDER — ADENOSINE 3 MG/ML
12 INJECTION, SOLUTION INTRAVENOUS ONCE
Refills: 0 | Status: COMPLETED | OUTPATIENT
Start: 2025-05-22 | End: 2025-05-22

## 2025-05-22 RX ORDER — BUMETANIDE 1 MG/1
1 TABLET ORAL ONCE
Refills: 0 | Status: DISCONTINUED | OUTPATIENT
Start: 2025-05-22 | End: 2025-05-22

## 2025-05-22 RX ORDER — IPRATROPIUM BROMIDE AND ALBUTEROL SULFATE .5; 2.5 MG/3ML; MG/3ML
3 SOLUTION RESPIRATORY (INHALATION) ONCE
Refills: 0 | Status: COMPLETED | OUTPATIENT
Start: 2025-05-22 | End: 2025-05-22

## 2025-05-22 RX ORDER — SODIUM CHLORIDE 9 G/1000ML
1000 INJECTION, SOLUTION INTRAVENOUS
Refills: 0 | Status: DISCONTINUED | OUTPATIENT
Start: 2025-05-22 | End: 2025-05-26

## 2025-05-22 RX ORDER — CALCIUM GLUCONATE 20 MG/ML
1 INJECTION, SOLUTION INTRAVENOUS ONCE
Refills: 0 | Status: DISCONTINUED | OUTPATIENT
Start: 2025-05-22 | End: 2025-05-22

## 2025-05-22 RX ORDER — CALCIUM GLUCONATE 20 MG/ML
1 INJECTION, SOLUTION INTRAVENOUS ONCE
Refills: 0 | Status: COMPLETED | OUTPATIENT
Start: 2025-05-22 | End: 2025-05-22

## 2025-05-22 RX ORDER — AMLODIPINE BESYLATE 10 MG/1
10 TABLET ORAL DAILY
Refills: 0 | Status: DISCONTINUED | OUTPATIENT
Start: 2025-05-22 | End: 2025-05-26

## 2025-05-22 RX ORDER — FUROSEMIDE 10 MG/ML
40 INJECTION INTRAMUSCULAR; INTRAVENOUS ONCE
Refills: 0 | Status: DISCONTINUED | OUTPATIENT
Start: 2025-05-22 | End: 2025-05-22

## 2025-05-22 RX ORDER — SODIUM ZIRCONIUM CYCLOSILICATE 5 G/5G
10 POWDER, FOR SUSPENSION ORAL ONCE
Refills: 0 | Status: COMPLETED | OUTPATIENT
Start: 2025-05-22 | End: 2025-05-22

## 2025-05-22 RX ORDER — INSULIN LISPRO 100 U/ML
INJECTION, SOLUTION INTRAVENOUS; SUBCUTANEOUS
Refills: 0 | Status: DISCONTINUED | OUTPATIENT
Start: 2025-05-22 | End: 2025-05-26

## 2025-05-22 RX ORDER — ADENOSINE 3 MG/ML
6 INJECTION, SOLUTION INTRAVENOUS ONCE
Refills: 0 | Status: COMPLETED | OUTPATIENT
Start: 2025-05-22 | End: 2025-05-22

## 2025-05-22 RX ORDER — MELATONIN 5 MG
3 TABLET ORAL AT BEDTIME
Refills: 0 | Status: DISCONTINUED | OUTPATIENT
Start: 2025-05-22 | End: 2025-05-26

## 2025-05-22 RX ORDER — SODIUM BICARBONATE 1 MEQ/ML
0.16 SYRINGE (ML) INTRAVENOUS
Qty: 150 | Refills: 0 | Status: DISCONTINUED | OUTPATIENT
Start: 2025-05-22 | End: 2025-05-23

## 2025-05-22 RX ORDER — SODIUM ZIRCONIUM CYCLOSILICATE 5 G/5G
10 POWDER, FOR SUSPENSION ORAL EVERY 8 HOURS
Refills: 0 | Status: COMPLETED | OUTPATIENT
Start: 2025-05-22 | End: 2025-05-23

## 2025-05-22 RX ORDER — BUMETANIDE 1 MG/1
1 TABLET ORAL ONCE
Refills: 0 | Status: COMPLETED | OUTPATIENT
Start: 2025-05-22 | End: 2025-05-22

## 2025-05-22 RX ORDER — GLUCAGON 3 MG/1
1 POWDER NASAL ONCE
Refills: 0 | Status: DISCONTINUED | OUTPATIENT
Start: 2025-05-22 | End: 2025-05-26

## 2025-05-22 RX ORDER — SPIRONOLACTONE 25 MG
1 TABLET ORAL
Refills: 0 | DISCHARGE

## 2025-05-22 RX ORDER — ASPIRIN 325 MG
81 TABLET ORAL DAILY
Refills: 0 | Status: DISCONTINUED | OUTPATIENT
Start: 2025-05-22 | End: 2025-05-26

## 2025-05-22 RX ORDER — ACETAMINOPHEN 500 MG/5ML
1000 LIQUID (ML) ORAL ONCE
Refills: 0 | Status: COMPLETED | OUTPATIENT
Start: 2025-05-22 | End: 2025-05-22

## 2025-05-22 RX ORDER — INSULIN LISPRO 100 U/ML
INJECTION, SOLUTION INTRAVENOUS; SUBCUTANEOUS AT BEDTIME
Refills: 0 | Status: DISCONTINUED | OUTPATIENT
Start: 2025-05-22 | End: 2025-05-26

## 2025-05-22 RX ORDER — DEXTROSE 50 % IN WATER 50 %
50 SYRINGE (ML) INTRAVENOUS ONCE
Refills: 0 | Status: DISCONTINUED | OUTPATIENT
Start: 2025-05-22 | End: 2025-05-22

## 2025-05-22 RX ORDER — DEXTROSE 50 % IN WATER 50 %
12.5 SYRINGE (ML) INTRAVENOUS ONCE
Refills: 0 | Status: DISCONTINUED | OUTPATIENT
Start: 2025-05-22 | End: 2025-05-26

## 2025-05-22 RX ORDER — DEXTROSE 50 % IN WATER 50 %
15 SYRINGE (ML) INTRAVENOUS ONCE
Refills: 0 | Status: DISCONTINUED | OUTPATIENT
Start: 2025-05-22 | End: 2025-05-26

## 2025-05-22 RX ORDER — SODIUM POLYSTYRENE SULFONATE 15 G/60ML
30 SUSPENSION ORAL; RECTAL ONCE
Refills: 0 | Status: DISCONTINUED | OUTPATIENT
Start: 2025-05-22 | End: 2025-05-22

## 2025-05-22 RX ORDER — CARVEDILOL 3.12 MG/1
25 TABLET, FILM COATED ORAL EVERY 12 HOURS
Refills: 0 | Status: DISCONTINUED | OUTPATIENT
Start: 2025-05-22 | End: 2025-05-26

## 2025-05-22 RX ORDER — ACETAMINOPHEN 500 MG/5ML
650 LIQUID (ML) ORAL EVERY 6 HOURS
Refills: 0 | Status: DISCONTINUED | OUTPATIENT
Start: 2025-05-22 | End: 2025-05-26

## 2025-05-22 RX ADMIN — Medication 4 MILLIGRAM(S): at 08:36

## 2025-05-22 RX ADMIN — Medication 125 MILLILITER(S): at 16:06

## 2025-05-22 RX ADMIN — Medication 400 MILLIGRAM(S): at 08:36

## 2025-05-22 RX ADMIN — CALCIUM GLUCONATE 1 GRAM(S): 20 INJECTION, SOLUTION INTRAVENOUS at 11:00

## 2025-05-22 RX ADMIN — Medication 5 UNIT(S): at 19:52

## 2025-05-22 RX ADMIN — BUMETANIDE 1 MILLIGRAM(S): 1 TABLET ORAL at 11:44

## 2025-05-22 RX ADMIN — Medication 50 MILLILITER(S): at 19:51

## 2025-05-22 RX ADMIN — CALCIUM GLUCONATE 100 GRAM(S): 20 INJECTION, SOLUTION INTRAVENOUS at 09:57

## 2025-05-22 RX ADMIN — Medication 10 UNIT(S): at 09:57

## 2025-05-22 RX ADMIN — Medication 25 MILLILITER(S): at 09:56

## 2025-05-22 RX ADMIN — Medication 1000 MILLIGRAM(S): at 09:18

## 2025-05-22 RX ADMIN — ADENOSINE 6 MILLIGRAM(S): 3 INJECTION, SOLUTION INTRAVENOUS at 12:10

## 2025-05-22 RX ADMIN — IPRATROPIUM BROMIDE AND ALBUTEROL SULFATE 3 MILLILITER(S): .5; 2.5 SOLUTION RESPIRATORY (INHALATION) at 19:43

## 2025-05-22 RX ADMIN — Medication 100 MEQ/KG/HR: at 16:06

## 2025-05-22 RX ADMIN — SODIUM ZIRCONIUM CYCLOSILICATE 10 GRAM(S): 5 POWDER, FOR SUSPENSION ORAL at 16:07

## 2025-05-22 RX ADMIN — ADENOSINE 12 MILLIGRAM(S): 3 INJECTION, SOLUTION INTRAVENOUS at 12:19

## 2025-05-22 RX ADMIN — SODIUM ZIRCONIUM CYCLOSILICATE 10 GRAM(S): 5 POWDER, FOR SUSPENSION ORAL at 09:57

## 2025-05-22 RX ADMIN — ADENOSINE 12 MILLIGRAM(S): 3 INJECTION, SOLUTION INTRAVENOUS at 12:15

## 2025-05-22 RX ADMIN — Medication 1000 MILLIGRAM(S): at 09:22

## 2025-05-22 RX ADMIN — SODIUM ZIRCONIUM CYCLOSILICATE 10 GRAM(S): 5 POWDER, FOR SUSPENSION ORAL at 19:22

## 2025-05-22 NOTE — CONSULT NOTE ADULT - SUBJECTIVE AND OBJECTIVE BOX
Patient chart reviewed, full consult to follow.     Discussed with Dr. Brown and JUAN Lofton, medical rx K;   IVF with sodium bicarbonate; follow K trend.       Thank you for the courtesy of this consultation. [facility], [facility]  NEPHROLOGY AND HYPERTENSION  [address]  [address]  [place] [address]  [phone]    MD RENEE VITAL MD YELENA ROSENBERG, MD BINNY KOSHY, MD CHRISTOPHER CAPUTO, MD EDWARD BOVER, MD      Information from chart:  "Patient is a 49y old  Female who presents with a chief complaint of SVT, DAVID on CKD, hyperkalemia (22 May 2025 14:35)    HPI:  49 years old female with h/o HTN, HLD, DM, CKD, HF with midrange EF, chronic hypoxic respiratory failure on 2L NC at home, obesity  present to ED with complain of episode of abdominal discomfort. Patient reported today AM, she had episode of abdominal discomfort, tightness of chest and lightheadedness. Denied any chest pain, diaphoresis or LOC. Reported low BP at home.  Noted to be in SVT in ED with HR in 140s, broke with adenosine, afebrile, sat well at RA. No leukocytosis, plt 154, K 7.2---> 7.1, Cr 3.1  ( 2.3 in 01/2025). RUQ ultrasound with no acute pathology. CXR with no focal consolidation.   (22 May 2025 14:19)   "    no distress  Denies muscle weakness;   Recently started on Spironolactone and Entresto;   Cardiology managing HF medications   Hyperkalemia noted on admission with SVT, managed with adenosine.      PAST MEDICAL & SURGICAL HISTORY:  Hypertension      Diabetes      Peptic ulcer disease      Chronic systolic congestive heart failure      No significant past surgical history        FAMILY HISTORY:    Allergies    lisinopril (Other)    Intolerances      Home Medications:  Aldactone 25 mg oral tablet: 1 tab(s) orally once a day (22 May 2025 13:33)  amLODIPine 10 mg oral tablet: 1 tab(s) orally once a day (13 Jan 2025 11:24)  aspirin 81 mg oral delayed release tablet: 1 tab(s) orally once a day (13 Jan 2025 11:24)  Coreg 25 mg oral tablet: 1 tab(s) orally 2 times a day (10 Andrew 2025 06:03)  Entresto 97 mg-103 mg oral tablet: 1 tab(s) orally 2 times a day (10 Andrew 2025 06:03)  Jardiance 25 mg oral tablet: 1 tab(s) orally once a day (10 Andrew 2025 06:03)  Tradjenta 5 mg oral tablet: 1 tab(s) orally once a day (10 Andrew 2025 06:03)    MEDICATIONS  (STANDING):  albuterol/ipratropium for Nebulization. 3 milliLiter(s) Nebulizer once  amLODIPine   Tablet 10 milliGRAM(s) Oral daily  aspirin enteric coated 81 milliGRAM(s) Oral daily  carvedilol 25 milliGRAM(s) Oral every 12 hours  dextrose 5%. 1000 milliLiter(s) (50 mL/Hr) IV Continuous <Continuous>  dextrose 5%. 1000 milliLiter(s) (100 mL/Hr) IV Continuous <Continuous>  dextrose 50% Injectable 25 Gram(s) IV Push once  dextrose 50% Injectable 12.5 Gram(s) IV Push once  dextrose 50% Injectable 25 Gram(s) IV Push once  dextrose 50% Injectable 50 milliLiter(s) IV Push once  glucagon  Injectable 1 milliGRAM(s) IntraMuscular once  heparin   Injectable 5000 Unit(s) SubCutaneous every 8 hours  insulin lispro (ADMELOG) corrective regimen sliding scale   SubCutaneous three times a day before meals  insulin lispro (ADMELOG) corrective regimen sliding scale   SubCutaneous at bedtime  insulin regular  human recombinant 5 Unit(s) IV Push once  sodium bicarbonate  Infusion 0.157 mEq/kG/Hr (100 mL/Hr) IV Continuous <Continuous>  sodium chloride 0.9%. 1000 milliLiter(s) (125 mL/Hr) IV Continuous <Continuous>  sodium zirconium cyclosilicate 10 Gram(s) Oral every 8 hours    MEDICATIONS  (PRN):  acetaminophen     Tablet .. 650 milliGRAM(s) Oral every 6 hours PRN Mild Pain (1 - 3), Moderate Pain (4 - 6)  dextrose Oral Gel 15 Gram(s) Oral once PRN Blood Glucose LESS THAN 70 milliGRAM(s)/deciliter  melatonin 3 milliGRAM(s) Oral at bedtime PRN Insomnia    Vital Signs Last 24 Hrs  T(C): 36.6 (22 May 2025 12:15), Max: 36.6 (22 May 2025 12:15)  T(F): 97.8 (22 May 2025 12:15), Max: 97.8 (22 May 2025 12:15)  HR: 149 (22 May 2025 12:15) (91 - 149)  BP: 116/80 (22 May 2025 12:15) (97/73 - 145/109)  BP(mean): --  RR: 20 (22 May 2025 12:15) (14 - 20)  SpO2: 100% (22 May 2025 12:15) (100% - 100%)    Parameters below as of 22 May 2025 12:15  Patient On (Oxygen Delivery Method): nasal cannula  O2 Flow (L/min): 2      Daily Height in cm: 167.64 (22 May 2025 07:47)    Daily     CAPILLARY BLOOD GLUCOSE      POCT Blood Glucose.: 90 mg/dL (22 May 2025 18:23)  POCT Blood Glucose.: 282 mg/dL (22 May 2025 07:54)    PHYSICAL EXAM:      T(C): 36.6 (05-22-25 @ 12:15), Max: 36.6 (05-22-25 @ 12:15)  HR: 149 (05-22-25 @ 12:15) (91 - 149)  BP: 116/80 (05-22-25 @ 12:15) (97/73 - 145/109)  RR: 20 (05-22-25 @ 12:15) (14 - 20)  SpO2: 100% (05-22-25 @ 12:15) (100% - 100%)  Wt(kg): --  Lungs clear  Heart S1S2  Abd soft NT ND  Extremities:   tr edema              05-22    141  |  115[H]  |  49[H]  ----------------------------<  61[L]  7.4[HH]   |  23  |  3.16[H]    Ca    8.7      22 May 2025 17:45  Phos  4.0     05-22  Mg     2.8     05-22    TPro  7.5  /  Alb  3.0[L]  /  TBili  0.4  /  DBili  x   /  AST  15  /  ALT  24  /  AlkPhos  120  05-22                          11.2   10.38 )-----------( 154      ( 22 May 2025 08:35 )             32.6     Creatinine Trend: 3.16<--, 3.10<--, 3.10<--, 3.19<--  Urinalysis Basic - ( 22 May 2025 17:45 )    Color: x / Appearance: x / SG: x / pH: x  Gluc: 61 mg/dL / Ketone: x  / Bili: x / Urobili: x   Blood: x / Protein: x / Nitrite: x   Leuk Esterase: x / RBC: x / WBC x   Sq Epi: x / Non Sq Epi: x / Bacteria: x      Basic Metabolic Panel (01.27.25 @ 11:10)    Creatinine: 2.31 mg/dL      05-22-25 @ 17:45   -  7.4[HH]  05-22-25 @ 11:25   -  7.1[HH]  05-22-25 @ 09:45   -  7.2[HH]  05-22-25 @ 08:35   -  6.6[HH]    Assessment   DAVID CKD 3-4; cardiorenal syndrome; hyperkalemia with DAVID, spironolactone and Entresto     Plan  Medical rx K with insulin/d50/ bicarbonate/Lokelma/ Lasix  Follow K trend 11 pm melody Elkins MD

## 2025-05-22 NOTE — H&P ADULT - NSHPPHYSICALEXAM_GEN_ALL_CORE
CONSTITUTIONAL: alert and cooperative, no acute distress.  EYES: PERRL,  no scleral icterus  ENT: Mucosa moist, tongue normal  NECK: Neck supple, trachea midline, non-tender  CARDIAC: Normal S1 and S2. Regular rate and rhythms. No Pedal edema  LUNGS: Equal air entry both lungs. No rales, rhonchi, wheezing. Normal respiratory effort.   ABDOMEN: Soft, nondistended, nontender. No guarding or rebound tenderness. No hepatomegaly or splenomegaly. Bowel sound normal.   MUSCULOSKELETAL: Normocephalic, atraumatic. No significant deformity or joint abnormality  NEUROLOGICAL: No gross motor or sensory deficits  SKIN: no lesions or eruptions. Normal turgor  PSYCHIATRIC: A&O x 3, appropriate mood and affect

## 2025-05-22 NOTE — ED PROCEDURE NOTE - CRITICAL CARE ATTENDING CONTRIBUTION TO CARE
This patient presents with evidence of a high probability of an imminent life or limb threatening condition requiring rapid intervention to prevent deterioration in the patient’s condition.  High complexity decision making to assess, manipulate, and support this patient is documented below.  It is my clinical judgement that failure to initiate these interventions on an urgent basis would likely result in sudden, clinically significant or life threatening deterioration in the patient's condition.      The total time spent evaluating, managing, and providing care to a critically ill patient was: 33 minutes.  This includes direct patient care at the bedside as well as time spent reviewing test results, discussing the case with consultants or family members, and documenting in the patient's chart.   It was exclusive of separately billable procedures and any teaching time related to this case.    Please see the rest of the note for further information on patient assessment and treatment.

## 2025-05-22 NOTE — H&P ADULT - NSHPLABSRESULTS_GEN_ALL_CORE
ECHO 01/26/25  1. Left ventricular endocardium is not well visualized; however, the left ventricular systolic function appears moderately reduced. Left   ventricular systolic function is moderately decreased with an ejection fraction of 43 % by Juarez's method of disks. There is severe left ventricular hypertrophy.   2. There is moderate (grade 2) left ventricular diastolic dysfunction.   3. Mild to moderate mitral regurgitation.   4. Aortic valve was not well visualized. Probably trileaflet with normal systolic excursion. Moderate aortic regurgitation.   5. Pulmonary artery systolic pressure could not be estimated.   6. Aortic root at the sinuses of Valsalva is normal in size, measuring 3.10 cm (indexed 1.45 cm/m²). Ascending aorta is dilated, measuring 3.78 cm (indexed 1.76 cm/m²).   7. Small pericardial effusion with no echocardiographic evidence of tamponade physiology.   8. The inferior vena cava is normal in size (normal <2.1cm) with normal inspiratory collapse (normal >50%) consistent with normal right atrial pressure (3, range 0-5mmHg).

## 2025-05-22 NOTE — ED ADULT TRIAGE NOTE - CHIEF COMPLAINT QUOTE
BIBEMS from home AAO x 3 c/o dizziness SOB abdominal pain and pins and needles feeling to her right foot x this am. pt reports she has had dizziness and low blood pressure x  for 2 weeks , then it resolved for 1 week and returned today with the additional symptoms. pt reports returning from a trip to [place] on may 12th. pt with hx of HTN DM CHF  denies injury trauma fevers NS chills N/v/D or bleeding at present time. BIBEMS from home AAO x 3 c/o dizziness SOB abdominal pain and pins and needles feeling to her right foot x this am. pt reports she has had dizziness and low blood pressure x  for 2 weeks , then it resolved for 1 week and returned today with the additional symptoms. pt reports returning from a trip to [place] on may 12th. pt with hx of HTN DM CHF  denies injury trauma fevers NS chills N/v/D or bleeding at present time.

## 2025-05-22 NOTE — ED PROVIDER NOTE - OBJECTIVE STATEMENT
This patient is a 49-year-old female presenting to the emergency department today with complaints of abdominal pain.  She has a past medical history of diabetes, hypertension, and CHF.  She chronically wears 2 L of oxygen via nasal cannula at home.  She states that she was out of the country several weeks ago started feeling light headed.  Prior to going out of the country her cardiologist had decreased her antihypertensive medications.  When she returned in the country roughly 10 days ago she spoke with her cardiologist and had the antihypertensive medications decreased.  They were not decreased back down to her normal.  After the decrease of the medications, her symptoms resolved.  Today at 0500 she started feeling lightheaded and started experiencing abdominal pain in the epigastric region of the right upper quadrant.  She states that increasing her home oxygen helped her feel better, however she did not feel short of breath.  She has no chest pain.  Is endorsing nausea.  No vomiting.  No constipation or diarrhea.  No change to her vision or hearing.  No dysuria or hematuria.  She has no other complaints at this time.

## 2025-05-22 NOTE — ED PROVIDER NOTE - PHYSICAL EXAMINATION
GENERAL: The patient appears uncomfortable.     EYES: Pupils equal and reactive.  Extraocular eye movements are intact.    ENT: Head is atraumatic.  Posterior oropharynx is unremarkable.      RESPIRATORY: Lungs are clear to auscultation bilaterally.  The patient has no significant wheezing, rhonchi, or rales.    CARDIOVASCULAR: The patient has a regular rate and rhythm with no significant murmurs, rubs, or gallops.    ABDOMEN: Abdomen is soft, nondistended, and non-peritoneal.  Tender to palpation in the right upper quadrant and epigastric region.    SKIN: Skin is intact without evidence of significant lacerations or sores.    MUSCULOSKELETAL: Patient has good range of motion of all extremities.  The patient has good distal cap refill.  The patient has palpable distal pulses.  No obvious edema is noted.    NEUROLOGIC: Cranial nerves II through XII are grossly within normal limits.  Sensory and motor examination is unremarkable.    PSYCHIATRIC: Patient is awake, alert, and oriented ×4.

## 2025-05-22 NOTE — ED PROVIDER NOTE - CRITICAL CARE ATTENDING CONTRIBUTION TO CARE
Patient is a 49-year-old female presenting to the emergency department today for abdominal pain.  CBC shows no leukocytosis.  No acute anemia.  No thrombocytopenia.  D-dimer elevated at 254.  Patient's initial potassium was elevated at 6.6.  Repeat potassium was taken noted to be 7.2.  Patient was given insulin, dextrose, Lokelma, calcium gluconate, and Bumex.  Repeat potassium noted to be 7.1.  Patient noted have an DAVID with creatinine of 3.10 baseline creatinine noted to be roughly 2.3 for this patient.  No elevated LFTs.  Troponin negative.  BNP elevated roughly 16,000.  She has concomitant CHF exacerbation.  Chest x-ray was performed showing some evidence of possible vascular congestion.    CT angio was ordered to evaluate for PE as the patient has an elevated D-dimer.  CT of the abdomen pelvis was also ordered to evaluate for her right upper quadrant pain.  Patient refused to have a CT exam done.    Ultrasound was ordered, however has not been completed at this time.    Patient noted to be in SVT while in our emergency department.  Required chemical cardioversion.  Please see procedure note for more information.  Patient did chemically cardioverted and is now in normal sinus rhythm.  She is not complaining of any chest pain at this time.    I discussed this case with the intensive care unit as well as nephrology.  Both came to evaluate the patient.  Per the recommendation, the patient should be admitted to the hospitalist service on the floors discussed this case with the hospitalist service to except admission for the patient.  Patient expressed understanding and was amenable for admission at this time.    This patient presents with evidence of a high probability of an imminent life or limb threatening condition requiring rapid intervention to prevent deterioration in the patient’s condition.  High complexity decision making to assess, manipulate, and support this patient is documented below.  It is my clinical judgement that failure to initiate these interventions on an urgent basis would likely result in sudden, clinically significant or life threatening deterioration in the patient's condition.      The total time spent evaluating, managing, and providing care to a critically ill patient was: 33 minutes.  This includes direct patient care at the bedside as well as time spent reviewing test results, discussing the case with consultants or family members, and documenting in the patient's chart.   It was exclusive of separately billable procedures and any teaching time related to this case.    Please see the rest of the note for further information on patient assessment and treatment.

## 2025-05-22 NOTE — ED PROVIDER NOTE - NS ED ROS FT
CONSTITUTIONAL: No weight loss, fever, chills, weakness or fatigue.  Positive for lightheadedness.      HEENT:    Eyes: No visual loss, blurred vision, double vision or yellow sclerae.  Ears, Nose, Throat: No hearing loss, sneezing, congestion, runny nose or sore throat.    CARDIOVASCULAR: No chest pain, chest pressure or chest discomfort. No palpitations or edema.    RESPIRATORY: No shortness of breath, cough or sputum.    GASTROINTESTINAL: No anorexia, vomiting or diarrhea. Positive for abdominal pain and nausea.  No bleeding.    SKIN: No rash or itching.    GENITOURINARY: Patient denies any changes to bowel or bladder function.    NEUROLOGICAL: No headache, dizziness, syncope, paralysis, ataxia, numbness or tingling in the extremities. No change in bowel or bladder control.    MUSCULOSKELETAL: No muscle, back pain, joint pain or stiffness.

## 2025-05-22 NOTE — CONSULT NOTE ADULT - ASSESSMENT
50yo F hx NICM vs HOCM (EF 43% with severe LVH), CKD III, chronic HRF on 2LNC from CHF, HTN, HLD, ex smoker presents to the ED with abd pain. PT found to be hyperkalemic with DAVID and in SVT. Pt converted to NSR with adenosine x1 and shifted for hyperK. ICU consulted for further eval.    recs:  - converted to NSR from SVT with adenosine x1  - no over signs of volume overload and POCUS with dry lungs and LV fx around her baseline   - hyperkalemia and worsening DAVID (crn 2.3-->3.1) likely from overdiuresis with her bumex and initiation of spirolactone   - EKG without peaked T waves and s/p shifting K in the ED   - discussed with dr alonso and agreed no urgent need for HD. Plan to start on isotonic bicarb drip x4 hrs, lokelma, and repeat cmp afterward  - f/u renal and cards recs  - pt has no icu needs. please reconsult with any questions     d/w dr walsh, dr alonso, and dr ramos

## 2025-05-22 NOTE — CONSULT NOTE ADULT - CRITICAL CARE ATTENDING COMMENT
49F w/ NICM vs HOCM, CKD, chronic hypoxemic respiratory failure on 2LNC, HTN, ex-smoker. Has had multiple prior admissions for heart failure. Presents w/ abdominal pain and palpitations. In ED, found to be in SVT w. stable BP and asymptomatic. Labs revealed DAVID on CKD, hyperkalemia. pt given adenosine with successful conversion to NSR. ICU consulted.     Of note, pt has been taking her meds consistently. Since her last admission she was switched to bumex with increasing doses recently w/ good UOP and was started on spirinolactone ~10 days ago.     - suspect pt has been a bit over-diuresed and combined with K sparing diuresis, developed hyperK  - hold off on further diuresis for now, as pt is likely dry, which is consistent with POCUS on exam  - EKG without acute changes  - per discussion w/ renal, no plan for emergent HD- give Lokelma and bicarb IVF and repeat BMP after   - at this time, pt does not require ICU level of care; please call back with any further questions or if clinical status changes

## 2025-05-22 NOTE — H&P ADULT - HISTORY OF PRESENT ILLNESS
49 years old female with h/o HTN, HLD, DM, CKD, HF with midrange EF, chronic hypoxic respiratory failure on 2L NC at home, obesity  present to ED with complain of episode of abdominal discomfort. Patient reported today AM, she had episode of abdominal discomfort, tightness of chest and lightheadedness. Denied any chest pain, diaphoresis or LOC. Reported low BP at home.  Noted to be in SVT in ED with HR in 140s, broke with adenosine, afebrile, sat well at RA. No leukocytosis, plt 154, K 7.2---> 7.1, Cr 3.1  ( 2.3 in 01/2025). RUQ ultrasound with no acute pathology. CXR with no focal consolidation.

## 2025-05-22 NOTE — ED ADULT NURSE NOTE - NSFALLUNIVINTERV_ED_ALL_ED
Bed/Stretcher in lowest position, wheels locked, appropriate side rails in place/Call bell, personal items and telephone in reach/Instruct patient to call for assistance before getting out of bed/chair/stretcher/Non-slip footwear applied when patient is off stretcher/[place] to call system/Physically safe environment - no spills, clutter or unnecessary equipment/Purposeful proactive rounding/Room/bathroom lighting operational, light cord in reach

## 2025-05-22 NOTE — ED ADULT NURSE NOTE - CHIEF COMPLAINT QUOTE
BIBEMS from home AAO x 3 c/o dizziness SOB abdominal pain and pins and needles feeling to her right foot x this am. pt reports she has had dizziness and low blood pressure x  for 2 weeks , then it resolved for 1 week and returned today with the additional symptoms. pt reports returning from a trip to [place] on may 12th. pt with hx of HTN DM CHF  denies injury trauma fevers NS chills N/v/D or bleeding at present time.

## 2025-05-22 NOTE — ED ADULT NURSE REASSESSMENT NOTE - NS ED NURSE REASSESS COMMENT FT1
pt refused ct scan x3. pt escorted by this writer/RN to see the ct scan vs MRI scan. pt offered meds to reduce anxiety for ct scan imaging, pt slamming hand down on bed yelling " I am not doing the Cat Scan! I don't care, I am not doing it!". MD aware. pt returned to unit, pt scrolling through phone with no grimace on face.  pt placed on cardiac monitor. N/C 2L in use. will continue to monitor.
pt awake and compliant in bed. pt is asymptomatic. HR rhythmic SVT. pt escorted via stretcher to recess room for possible cardioversion. pt placed on zoll for monitoring and cardiac monitor. n/c 2Lpm. iv access patent. pt verbalized understanding of bedside procedure performing. pt medicated as per MD orders 12-6-12 pt converted back to sinus rhythm on 3 dose. pt is cheerful and talking in complete sentences. tolerated  medication well. pt remains on cardiac monitor. pt in bed with eyes opened. pt is in no respiratory distress. pt using cellular phone at this time. will continue to monitor.

## 2025-05-22 NOTE — ED PROVIDER NOTE - CLINICAL SUMMARY MEDICAL DECISION MAKING FREE TEXT BOX
Patient is a 49-year-old female presenting to the emergency department today for abdominal pain.  CBC shows no leukocytosis.  No acute anemia.  No thrombocytopenia.  D-dimer elevated at 254.  Patient's initial potassium was elevated at 6.6.  Repeat potassium was taken noted to be 7.2.  Patient was given insulin, dextrose, Lokelma, calcium gluconate, and Bumex.  Repeat potassium noted to be 7.1.  Patient noted have an DAVID with creatinine of 3.10 baseline creatinine noted to be roughly 2.3 for this patient.  No elevated LFTs.  Troponin negative.  BNP elevated roughly 16,000.  She has concomitant CHF exacerbation.  Chest x-ray was performed showing some evidence of possible vascular congestion.    CT angio was ordered to evaluate for PE as the patient has an elevated D-dimer.  CT of the abdomen pelvis was also ordered to evaluate for her right upper quadrant pain.  Patient refused to have a CT exam done.    Ultrasound was ordered, however has not been completed at this time.    Patient noted to be in SVT while in our emergency department.  Required chemical cardioversion.  Please see procedure note for more information.  Patient did chemically cardioverted and is now in normal sinus rhythm.  She is not complaining of any chest pain at this time.    I discussed this case with the intensive care unit as well as nephrology.  Both came to evaluate the patient.  Per the recommendation, the patient should be admitted to the hospitalist service on the floors discussed this case with the hospitalist service to except admission for the patient.  Patient expressed understanding and was amenable for admission at this time.    This patient presents with evidence of a high probability of an imminent life or limb threatening condition requiring rapid intervention to prevent deterioration in the patient’s condition.  High complexity decision making to assess, manipulate, and support this patient is documented below.  It is my clinical judgement that failure to initiate these interventions on an urgent basis would likely result in sudden, clinically significant or life threatening deterioration in the patient's condition.      The total time spent evaluating, managing, and providing care to a critically ill patient was: 33minutes.  This includes direct patient care at the bedside as well as time spent reviewing test results, discussing the case with consultants or family members, and documenting in the patient's chart.   It was exclusive of separately billable procedures and any teaching time related to this case.    Please see the rest of the note for further information on patient assessment and treatment.

## 2025-05-22 NOTE — H&P ADULT - PROBLEM SELECTOR PLAN 1
labs with significant hyperkalemia K 7.2---> 7.1  Received insulin, calcium gluconate and lokelma  due to DAVID on CKD  ICU consulted, no need for ICU care  Give additional Lokelma 10g  started on Na bicarb drip  Repeat BMP ( ordered)  telemetry monitoring  Nephrology consulted  Closely monitor serum  K

## 2025-05-22 NOTE — H&P ADULT - PROBLEM SELECTOR PLAN 2
Cr 3.1  ( 2.3 in 01/2025)  Clinically appear dry  Likely due to overdiuresis  trial of gentle IV hydration  hold off diuretics and entresto  Monitor renal function, avoid nephrotoxic substances  nephrology consulted

## 2025-05-22 NOTE — CONSULT NOTE ADULT - SUBJECTIVE AND OBJECTIVE BOX
CHIEF COMPLAINT: abd pain    HPI:  48yo F hx NICM vs HOCM (EF 43% with severe LVH), CKD III, chronic HRF on 2LNC from CHF, HTN, HLD, ex smoker presents to the ED with abd pain. Pt has had multiple prior admissions for chf exacerbation requiring IV lasix and NSVT.   After her last hospitalization this year she was switched from lasix to bumex. Pt last saw her HF doc Dr Bagley back in march and was started on spironolactone as well. Since her last visit pt endorses vigorous UO and over the last 2 weeks she has had increasing dizziness and abd discomfort. This AM pt had worsening palpitations and so she came to the ED. In the ED pt was found to be in SVT with stable BPs and asymptomatic. Pt given adenosine x1 with sucessful conversion to NSR. BMP with K of 7.2 and worsening DAVID. K shifted and icu consulted for further eval.    Subjective: Pt seen at bedside. Pt in NSR texting on her phone with stable BPs and satting well on her home 2LNC. She endorses feeling better since coming to the ED and currently denies any cp, sob, abd pain, lightheadedness, fevers, chills, n/v, dizziness, or weakness. She has been compliant with her GDMT (entresto, coreg, jardiance, and bumex)     PAST MEDICAL & SURGICAL HISTORY:  Hypertension      Diabetes      Peptic ulcer disease      Chronic systolic congestive heart failure      No significant past surgical history          FAMILY HISTORY:      SOCIAL HISTORY:  Smoking: ex smoker  EtOH Use: denies   Marital Status:  Occupation:  Recent Travel: denies   Country of Birth:  Advance Directives:    Allergies    lisinopril (Other)    Intolerances        HOME MEDICATIONS:    REVIEW OF SYSTEMS:    [x ] All other systems negative  [ ] Unable to assess ROS because ________    OBJECTIVE:  ICU Vital Signs Last 24 Hrs  T(C): 36.6 (22 May 2025 12:15), Max: 36.6 (22 May 2025 12:15)  T(F): 97.8 (22 May 2025 12:15), Max: 97.8 (22 May 2025 12:15)  HR: 149 (22 May 2025 12:15) (91 - 149)  BP: 116/80 (22 May 2025 12:15) (97/73 - 145/109)  BP(mean): --  ABP: --  ABP(mean): --  RR: 20 (22 May 2025 12:15) (14 - 20)  SpO2: 100% (22 May 2025 12:15) (100% - 100%)    O2 Parameters below as of 22 May 2025 12:15  Patient On (Oxygen Delivery Method): nasal cannula  O2 Flow (L/min): 2            CAPILLARY BLOOD GLUCOSE      POCT Blood Glucose.: 282 mg/dL (22 May 2025 07:54)      PHYSICAL EXAM:  General: NAD texting on her phone  Neck: supple, no jvd  Respiratory: non labored and CTABL  Cardiovascular: reg rhythm  Abdomen: soft and non distended  Extremities: warm, no LE edema  Neurological: a/ox3, franz, no focal deficits      HOSPITAL MEDICATIONS:  MEDICATIONS  (STANDING):    MEDICATIONS  (PRN):      LABS:                        11.2   10.38 )-----------( 154      ( 22 May 2025 08:35 )             32.6     05-22    136  |  115[H]  |  47[H]  ----------------------------<  267[H]  7.1[HH]   |  19[L]  |  3.10[H]    Ca    8.8      22 May 2025 11:25    TPro  7.5  /  Alb  3.0[L]  /  TBili  0.4  /  DBili  x   /  AST  15  /  ALT  24  /  AlkPhos  120  05-22      Urinalysis Basic - ( 22 May 2025 11:25 )    Color: x / Appearance: x / SG: x / pH: x  Gluc: 267 mg/dL / Ketone: x  / Bili: x / Urobili: x   Blood: x / Protein: x / Nitrite: x   Leuk Esterase: x / RBC: x / WBC x   Sq Epi: x / Non Sq Epi: x / Bacteria: x            MICROBIOLOGY: reviewed     RADIOLOGY:  [x ] Reviewed and interpreted by me    EKG: reviewed     :  JOSE JUAN [address] dr    Indication:  volume assessment     PROCEDURE:  [x ] LIMITED ECHO  [x ] LIMITED CHEST  [ ] LIMITED RETROPERITONEAL  [ ] LIMITED ABDOMINAL  [ ] LIMITED DVT  [ ] NEEDLE GUIDANCE VASCULAR  [ ] NEEDLE GUIDANCE THORACENTESIS  [ ] NEEDLE GUIDANCE PARACENTESIS  [ ] NEEDLE GUIDANCE PERICARDIOCENTESIS  [ ] OTHER    FINDINGS:  Chest: A-line predominant, normal aeration pattern bilat. No effusions bilat.    ECHO: LV>RV with mildly reduced LV systolic function with LVH no wall motion abnormalities, nor septal bowing/flattening  No pericardial effusion  IVC: unable to visualize    INTERPRETATION:  lung exam wnl  LV function around baseline

## 2025-05-22 NOTE — H&P ADULT - PROBLEM SELECTOR PLAN 3
EKG  ( I personally review) SVT with HR in 140s   Broke with adenosine  Likely driven by electrolytes disorder   Check TSH, free thyroxine  Telemetry monitoring  Continue coreg 25mg bid  If SVT recurs, will consider cardiology consult EKG  ( I personally review) SVT with HR in 140s   Broke with adenosine  Likely driven by electrolytes disorder   Check TSH, free thyroxine  Telemetry monitoring  Continue coreg 25mg bid  If SVT recurs, will consider cardiology consult  Borderline positive ddimer. Likely due to CKD. Check LE doppler.

## 2025-05-23 LAB
A1C WITH ESTIMATED AVERAGE GLUCOSE RESULT: 7.4 % — HIGH (ref 4–5.6)
ALBUMIN SERPL ELPH-MCNC: 2.8 G/DL — LOW (ref 3.3–5)
ALP SERPL-CCNC: 116 U/L — SIGNIFICANT CHANGE UP (ref 40–120)
ALT FLD-CCNC: 20 U/L — SIGNIFICANT CHANGE UP (ref 12–78)
ANION GAP SERPL CALC-SCNC: 2 MMOL/L — LOW (ref 5–17)
AST SERPL-CCNC: 13 U/L — LOW (ref 15–37)
BILIRUB SERPL-MCNC: 0.5 MG/DL — SIGNIFICANT CHANGE UP (ref 0.2–1.2)
BUN SERPL-MCNC: 39 MG/DL — HIGH (ref 7–23)
CALCIUM SERPL-MCNC: 8 MG/DL — LOW (ref 8.5–10.1)
CHLORIDE SERPL-SCNC: 111 MMOL/L — HIGH (ref 96–108)
CO2 SERPL-SCNC: 27 MMOL/L — SIGNIFICANT CHANGE UP (ref 22–31)
CREAT SERPL-MCNC: 2.86 MG/DL — HIGH (ref 0.5–1.3)
EGFR: 20 ML/MIN/1.73M2 — LOW
EGFR: 20 ML/MIN/1.73M2 — LOW
ESTIMATED AVERAGE GLUCOSE: 166 MG/DL — HIGH (ref 68–114)
GLUCOSE BLDC GLUCOMTR-MCNC: 107 MG/DL — HIGH (ref 70–99)
GLUCOSE BLDC GLUCOMTR-MCNC: 237 MG/DL — HIGH (ref 70–99)
GLUCOSE SERPL-MCNC: 82 MG/DL — SIGNIFICANT CHANGE UP (ref 70–99)
HCT VFR BLD CALC: 31.9 % — LOW (ref 34.5–45)
HGB BLD-MCNC: 10.6 G/DL — LOW (ref 11.5–15.5)
MAGNESIUM SERPL-MCNC: 2.3 MG/DL — SIGNIFICANT CHANGE UP (ref 1.6–2.6)
MCHC RBC-ENTMCNC: 24.8 PG — LOW (ref 27–34)
MCHC RBC-ENTMCNC: 33.2 G/DL — SIGNIFICANT CHANGE UP (ref 32–36)
MCV RBC AUTO: 74.7 FL — LOW (ref 80–100)
NRBC BLD AUTO-RTO: 0 /100 WBCS — SIGNIFICANT CHANGE UP (ref 0–0)
PHOSPHATE SERPL-MCNC: 4.6 MG/DL — HIGH (ref 2.5–4.5)
PLATELET # BLD AUTO: 145 K/UL — LOW (ref 150–400)
POTASSIUM SERPL-MCNC: 5.3 MMOL/L — SIGNIFICANT CHANGE UP (ref 3.5–5.3)
POTASSIUM SERPL-SCNC: 5.3 MMOL/L — SIGNIFICANT CHANGE UP (ref 3.5–5.3)
PROT SERPL-MCNC: 6.9 GM/DL — SIGNIFICANT CHANGE UP (ref 6–8.3)
RBC # BLD: 4.27 M/UL — SIGNIFICANT CHANGE UP (ref 3.8–5.2)
RBC # FLD: 17.7 % — HIGH (ref 10.3–14.5)
SODIUM SERPL-SCNC: 140 MMOL/L — SIGNIFICANT CHANGE UP (ref 135–145)
T4 FREE SERPL-MCNC: 1.3 NG/DL — SIGNIFICANT CHANGE UP (ref 0.9–1.8)
WBC # BLD: 9.89 K/UL — SIGNIFICANT CHANGE UP (ref 3.8–10.5)
WBC # FLD AUTO: 9.89 K/UL — SIGNIFICANT CHANGE UP (ref 3.8–10.5)

## 2025-05-23 PROCEDURE — 99233 SBSQ HOSP IP/OBS HIGH 50: CPT

## 2025-05-23 PROCEDURE — 93970 EXTREMITY STUDY: CPT | Mod: 26

## 2025-05-23 RX ORDER — APIXABAN 2.5 MG/1
2.5 TABLET, FILM COATED ORAL EVERY 12 HOURS
Refills: 0 | Status: DISCONTINUED | OUTPATIENT
Start: 2025-05-23 | End: 2025-05-26

## 2025-05-23 RX ADMIN — CARVEDILOL 25 MILLIGRAM(S): 3.12 TABLET, FILM COATED ORAL at 18:50

## 2025-05-23 RX ADMIN — Medication 81 MILLIGRAM(S): at 11:24

## 2025-05-23 RX ADMIN — APIXABAN 2.5 MILLIGRAM(S): 2.5 TABLET, FILM COATED ORAL at 21:21

## 2025-05-23 RX ADMIN — CARVEDILOL 25 MILLIGRAM(S): 3.12 TABLET, FILM COATED ORAL at 05:29

## 2025-05-23 RX ADMIN — SODIUM ZIRCONIUM CYCLOSILICATE 10 GRAM(S): 5 POWDER, FOR SUSPENSION ORAL at 13:27

## 2025-05-23 RX ADMIN — SODIUM ZIRCONIUM CYCLOSILICATE 10 GRAM(S): 5 POWDER, FOR SUSPENSION ORAL at 05:28

## 2025-05-23 NOTE — PATIENT PROFILE ADULT - FALL HARM RISK - HARM RISK INTERVENTIONS

## 2025-05-23 NOTE — PATIENT PROFILE ADULT - INTERNATIONAL TRAVEL DAYS 1
She was hospitalized 8/13/2024 through 8/21/2024 for acute left ankle fracture.  She underwent ORIF of left ankle fracture 8/14/2024.  She is currently doing physical therapy for the left ankle fracture after wearing a boot for 7 weeks and being in rehabilitation facility for 10 days.  She notes having had episodes of lightheadedness.  She was noted to have low blood pressure for which reason antihypertensive medication dose was reduced.  She continues to have intermittent episodes of lightheadedness but also notes that her blood pressure has been increasing.  She has pain along the medial aspect of the right elbow associated with some discomfort in the right hand.  She has noted limited walking distance due to the pain in the left ankle after prolonged walking.    Examination shows the lungs to be clear to auscultation.  The heart has a regular and rhythm with normal heart sounds.  The abdomen is benign.  The extremities are without edema.  The musculoskeletal examination shows tenderness overlying the right medial humeral epicondyles with very slight flexion contracture of the right elbow. The roll test is normal at both hips.  The knees are not swollen.  The straight leg raise test is normal bilaterally in the seated position.    Laboratory (9/3/2024) BUN 22, creatinine 1.31, glucose 76, sodium 142, potassium 4.7, chloride 106, bicarbonate 25, WBC 7.46, hemoglobin 11.3, hematocrit 35.6, MCV 97.5, MCHC 31.7, platelets 329.    She has status post ORIF of the left ankle fracture (8/14/2024).  She has history of hypertension, hypothyroidism, obstructive sleep apnea, IgG kappa monoclonal gammopathy, renal insufficiency, history of hepatitis C, cervical and lumbar spondylosis with lumbar spinal canal stenosis, status post right total knee arthroplasty (12/30/2020), left total hip arthroplasty, bilateral carpal tunnel release surgery.    She is referred for DEXA bone density study for evaluation for osteoporosis.  She  is to continue Cymbalta 30 mg daily and gabapentin 300 mg 3 times per day.  She is to return at the next available office appointment.   7-14 days ([facility])

## 2025-05-24 LAB
ALBUMIN SERPL ELPH-MCNC: 2.4 G/DL — LOW (ref 3.3–5)
ALP SERPL-CCNC: 111 U/L — SIGNIFICANT CHANGE UP (ref 40–120)
ALT FLD-CCNC: 16 U/L — SIGNIFICANT CHANGE UP (ref 12–78)
ANION GAP SERPL CALC-SCNC: 2 MMOL/L — LOW (ref 5–17)
AST SERPL-CCNC: 7 U/L — LOW (ref 15–37)
BILIRUB SERPL-MCNC: 0.3 MG/DL — SIGNIFICANT CHANGE UP (ref 0.2–1.2)
BUN SERPL-MCNC: 37 MG/DL — HIGH (ref 7–23)
CALCIUM SERPL-MCNC: 7.8 MG/DL — LOW (ref 8.5–10.1)
CHLORIDE SERPL-SCNC: 112 MMOL/L — HIGH (ref 96–108)
CO2 SERPL-SCNC: 27 MMOL/L — SIGNIFICANT CHANGE UP (ref 22–31)
CREAT SERPL-MCNC: 2.57 MG/DL — HIGH (ref 0.5–1.3)
EGFR: 22 ML/MIN/1.73M2 — LOW
EGFR: 22 ML/MIN/1.73M2 — LOW
GLUCOSE BLDC GLUCOMTR-MCNC: 113 MG/DL — HIGH (ref 70–99)
GLUCOSE BLDC GLUCOMTR-MCNC: 129 MG/DL — HIGH (ref 70–99)
GLUCOSE BLDC GLUCOMTR-MCNC: 179 MG/DL — HIGH (ref 70–99)
GLUCOSE SERPL-MCNC: 92 MG/DL — SIGNIFICANT CHANGE UP (ref 70–99)
HCT VFR BLD CALC: 29.4 % — LOW (ref 34.5–45)
HGB BLD-MCNC: 10 G/DL — LOW (ref 11.5–15.5)
MAGNESIUM SERPL-MCNC: 2.4 MG/DL — SIGNIFICANT CHANGE UP (ref 1.6–2.6)
MCHC RBC-ENTMCNC: 25.3 PG — LOW (ref 27–34)
MCHC RBC-ENTMCNC: 34 G/DL — SIGNIFICANT CHANGE UP (ref 32–36)
MCV RBC AUTO: 74.4 FL — LOW (ref 80–100)
NRBC BLD AUTO-RTO: 0 /100 WBCS — SIGNIFICANT CHANGE UP (ref 0–0)
PHOSPHATE SERPL-MCNC: 5.3 MG/DL — HIGH (ref 2.5–4.5)
PLATELET # BLD AUTO: 138 K/UL — LOW (ref 150–400)
POTASSIUM SERPL-MCNC: 4.6 MMOL/L — SIGNIFICANT CHANGE UP (ref 3.5–5.3)
POTASSIUM SERPL-SCNC: 4.6 MMOL/L — SIGNIFICANT CHANGE UP (ref 3.5–5.3)
PROT SERPL-MCNC: 6.3 GM/DL — SIGNIFICANT CHANGE UP (ref 6–8.3)
RBC # BLD: 3.95 M/UL — SIGNIFICANT CHANGE UP (ref 3.8–5.2)
RBC # FLD: 17.5 % — HIGH (ref 10.3–14.5)
SODIUM SERPL-SCNC: 141 MMOL/L — SIGNIFICANT CHANGE UP (ref 135–145)
WBC # BLD: 8.36 K/UL — SIGNIFICANT CHANGE UP (ref 3.8–10.5)
WBC # FLD AUTO: 8.36 K/UL — SIGNIFICANT CHANGE UP (ref 3.8–10.5)

## 2025-05-24 PROCEDURE — 99233 SBSQ HOSP IP/OBS HIGH 50: CPT

## 2025-05-24 RX ADMIN — CARVEDILOL 25 MILLIGRAM(S): 3.12 TABLET, FILM COATED ORAL at 17:41

## 2025-05-24 RX ADMIN — CARVEDILOL 25 MILLIGRAM(S): 3.12 TABLET, FILM COATED ORAL at 05:12

## 2025-05-24 RX ADMIN — APIXABAN 2.5 MILLIGRAM(S): 2.5 TABLET, FILM COATED ORAL at 05:12

## 2025-05-24 RX ADMIN — Medication 81 MILLIGRAM(S): at 13:04

## 2025-05-24 RX ADMIN — APIXABAN 2.5 MILLIGRAM(S): 2.5 TABLET, FILM COATED ORAL at 17:41

## 2025-05-25 DIAGNOSIS — I82.409 ACUTE EMBOLISM AND THROMBOSIS OF UNSPECIFIED DEEP VEINS OF UNSPECIFIED LOWER EXTREMITY: ICD-10-CM

## 2025-05-25 LAB
GLUCOSE BLDC GLUCOMTR-MCNC: 111 MG/DL — HIGH (ref 70–99)
GLUCOSE BLDC GLUCOMTR-MCNC: 141 MG/DL — HIGH (ref 70–99)
GLUCOSE BLDC GLUCOMTR-MCNC: 191 MG/DL — HIGH (ref 70–99)
POTASSIUM UR-SCNC: 33.8 MMOL/L — SIGNIFICANT CHANGE UP

## 2025-05-25 PROCEDURE — 99233 SBSQ HOSP IP/OBS HIGH 50: CPT

## 2025-05-25 RX ADMIN — APIXABAN 2.5 MILLIGRAM(S): 2.5 TABLET, FILM COATED ORAL at 05:24

## 2025-05-25 RX ADMIN — APIXABAN 2.5 MILLIGRAM(S): 2.5 TABLET, FILM COATED ORAL at 17:44

## 2025-05-25 RX ADMIN — CARVEDILOL 25 MILLIGRAM(S): 3.12 TABLET, FILM COATED ORAL at 17:44

## 2025-05-25 RX ADMIN — Medication 81 MILLIGRAM(S): at 11:24

## 2025-05-25 RX ADMIN — CARVEDILOL 25 MILLIGRAM(S): 3.12 TABLET, FILM COATED ORAL at 05:24

## 2025-05-25 NOTE — PROGRESS NOTE ADULT - PROBLEM SELECTOR PLAN 3
EKG  ( I personally review) SVT with HR in 140s   Broke with adenosine  Likely driven by electrolytes disorder   Check TSH, free thyroxine  Telemetry monitoring  Continue coreg 25mg bid  If SVT recurs, will consider cardiology consult  Borderline positive ddimer. Likely due to CKD. Check LE doppler.
no with VTACH   Broke with adenosine  Likely driven by electrolytes disorder   Telemetry monitoring  Continue coreg 25mg bid  If SVT recurs, will consider cardiology consult  LE doppler positive for DVT  Patient likely with pulmonary embolism however is not requiring oxygen and would not risk further kidney injury
EKG  ( I personally review) SVT with HR in 140s   Broke with adenosine  Likely driven by electrolytes disorder   Check TSH, free thyroxine  Telemetry monitoring  Continue coreg 25mg bid  If SVT recurs, will consider cardiology consult  Borderline positive ddimer. Likely due to CKD. Check LE doppler.

## 2025-05-25 NOTE — PROGRESS NOTE ADULT - PROBLEM SELECTOR PROBLEM 4
Heart failure with mid-range ejection fraction

## 2025-05-25 NOTE — PROGRESS NOTE ADULT - REASON FOR ADMISSION
SVT, DAVID on CKD, hyperkalemia

## 2025-05-25 NOTE — PROGRESS NOTE ADULT - TIME BILLING
The necessity of the time spent during the encounter on this date of service was due to:   - Ordering, reviewing, and interpreting labs, testing, and imaging.  - Independently obtaining a review of systems and performing a physical exam  - Reviewing prior hospitalization and where necessary, outpatient records.  - Reviewing consultant recommendations/communicating with consultants  - Counselling and educating patient and family regarding interpretation of aforementioned items and plan of care.    Time-based billing (NON-critical care). Total minutes spent: 52 Star Wedge Flap Text: The defect edges were debeveled with a #15 scalpel blade.  Given the location of the defect, shape of the defect and the proximity to free margins a star wedge flap was deemed most appropriate.  Using a sterile surgical marker, an appropriate rotation flap was drawn incorporating the defect and placing the expected incisions within the relaxed skin tension lines where possible. The area thus outlined was incised deep to adipose tissue with a #15 scalpel blade.  The skin margins were undermined to an appropriate distance in all directions utilizing iris scissors.

## 2025-05-25 NOTE — PROGRESS NOTE ADULT - PROBLEM SELECTOR PLAN 2
Cr 3.1  ( 2.3 in 01/2025)  Clinically appear dry  Likely due to overdiuresis  trial of gentle IV hydration  hold off diuretics and entresto  Monitor renal function, avoid nephrotoxic substances  nephrology consulted
improved back to baseline
hydrated with improvemennt

## 2025-05-25 NOTE — PROGRESS NOTE ADULT - PROBLEM SELECTOR PLAN 1
"Anesthesia Post Evaluation    Patient: Idania Morales    1/16/2018  7:38 AM)  Mayco Score: 8 (1/16/2018  7:38 AM)      "
Closely monitor serum  K    continue to monitor and replace as needed
labs with significant hyperkalemia K 7.2---> 7.1  Received insulin, calcium gluconate and lokelma  due to DAVID on CKD  ICU consulted, no need for ICU care  Give additional Lokelma 10g  started on Na bicarb drip  Repeat BMP ( ordered)  telemetry monitoring  Nephrology consulted  Closely monitor serum  K
rersolved

## 2025-05-25 NOTE — PROGRESS NOTE ADULT - PROBLEM SELECTOR PLAN 4
Clinically appear dry  Hold off diuretics given on DAVID on CKD

## 2025-05-25 NOTE — PROGRESS NOTE ADULT - SUBJECTIVE AND OBJECTIVE BOX
[facility] NEPHROLOGY SERVICES, [facility]  NEPHROLOGY AND HYPERTENSION  [address]  [address]  [place] [address]  [phone]    MD RENEE VITAL MD YELENA ROSENBERG, MD BINNY KOSHY, MD CHRISTOPHER CAPUTO, MD EDWARD BOVER, MD          Patient events noted  No distress    MEDICATIONS  (STANDING):  amLODIPine   Tablet 10 milliGRAM(s) Oral daily  apixaban 2.5 milliGRAM(s) Oral every 12 hours  aspirin enteric coated 81 milliGRAM(s) Oral daily  carvedilol 25 milliGRAM(s) Oral every 12 hours  dextrose 5%. 1000 milliLiter(s) (50 mL/Hr) IV Continuous <Continuous>  dextrose 5%. 1000 milliLiter(s) (100 mL/Hr) IV Continuous <Continuous>  dextrose 50% Injectable 25 Gram(s) IV Push once  dextrose 50% Injectable 12.5 Gram(s) IV Push once  dextrose 50% Injectable 25 Gram(s) IV Push once  glucagon  Injectable 1 milliGRAM(s) IntraMuscular once  insulin lispro (ADMELOG) corrective regimen sliding scale   SubCutaneous three times a day before meals  insulin lispro (ADMELOG) corrective regimen sliding scale   SubCutaneous at bedtime  sodium chloride 0.9%. 1000 milliLiter(s) (125 mL/Hr) IV Continuous <Continuous>    MEDICATIONS  (PRN):  acetaminophen     Tablet .. 650 milliGRAM(s) Oral every 6 hours PRN Mild Pain (1 - 3), Moderate Pain (4 - 6)  dextrose Oral Gel 15 Gram(s) Oral once PRN Blood Glucose LESS THAN 70 milliGRAM(s)/deciliter  melatonin 3 milliGRAM(s) Oral at bedtime PRN Insomnia      PHYSICAL EXAM:      T(C): 37.2 (05-23-25 @ 18:14), Max: 37.2 (05-23-25 @ 18:14)  HR: 94 (05-23-25 @ 18:14) (74 - 96)  BP: 132/80 (05-23-25 @ 18:14) (107/71 - 132/80)  RR: 18 (05-23-25 @ 18:14) (18 - 19)  SpO2: 99% (05-23-25 @ 18:14) (98% - 100%)  Wt(kg): --  Lungs clear  Heart S1S2  Abd soft NT ND  Extremities:   tr edema                                    10.6   9.89  )-----------( 145      ( 23 May 2025 08:05 )             31.9     05-23    140  |  111[H]  |  39[H]  ----------------------------<  82  5.3   |  27  |  2.86[H]    Ca    8.0[L]      23 May 2025 08:05  Phos  4.6     05-23  Mg     2.3     05-23    TPro  6.9  /  Alb  2.8[L]  /  TBili  0.5  /  DBili  x   /  AST  13[L]  /  ALT  20  /  AlkPhos  116  05-23      LIVER FUNCTIONS - ( 23 May 2025 08:05 )  Alb: 2.8 g/dL / Pro: 6.9 gm/dL / ALK PHOS: 116 U/L / ALT: 20 U/L / AST: 13 U/L / GGT: x           Creatinine Trend: 2.86<--, 3.16<--, 3.10<--, 3.10<--, 3.19<--    Assessment   DAVID CKD 3-4; cardiorenal syndrome; hyperkalemia with DAVID, spironolactone and Entresto   Improved     Plan  Clinically stable, trend K   Discharge planning     Jamaal Elkins MD
HPI:  49 years old female with h/o HTN, HLD, DM, CKD, HF with midrange EF, chronic hypoxic respiratory failure on 2L NC at home, obesity  present to ED with complain of episode of abdominal discomfort. Patient reported today AM, she had episode of abdominal discomfort, tightness of chest and lightheadedness. Denied any chest pain, diaphoresis or LOC. Reported low BP at home.  Noted to be in SVT in ED with HR in 140s, broke with adenosine, afebrile, sat well at RA. No leukocytosis, plt 154, K 7.2---> 7.1, Cr 3.1  ( 2.3 in 01/2025). RUQ ultrasound with no acute pathology. CXR with no focal consolidation.   (22 May 2025 14:19)  Patient is a 49y old  Female who presents with a chief complaint of SVT, DAVID on CKD, hyperkalemia (23 May 2025 21:35)      INTERVAL HPI/OVERNIGHT EVENTS: no acute evenbts b3ut continuerd vtach     MEDICATIONS  (STANDING):  amLODIPine   Tablet 10 milliGRAM(s) Oral daily  apixaban 2.5 milliGRAM(s) Oral every 12 hours  aspirin enteric coated 81 milliGRAM(s) Oral daily  carvedilol 25 milliGRAM(s) Oral every 12 hours  dextrose 5%. 1000 milliLiter(s) (50 mL/Hr) IV Continuous <Continuous>  dextrose 5%. 1000 milliLiter(s) (100 mL/Hr) IV Continuous <Continuous>  dextrose 50% Injectable 25 Gram(s) IV Push once  dextrose 50% Injectable 12.5 Gram(s) IV Push once  dextrose 50% Injectable 25 Gram(s) IV Push once  glucagon  Injectable 1 milliGRAM(s) IntraMuscular once  insulin lispro (ADMELOG) corrective regimen sliding scale   SubCutaneous three times a day before meals  insulin lispro (ADMELOG) corrective regimen sliding scale   SubCutaneous at bedtime  sodium chloride 0.9%. 1000 milliLiter(s) (125 mL/Hr) IV Continuous <Continuous>    MEDICATIONS  (PRN):  acetaminophen     Tablet .. 650 milliGRAM(s) Oral every 6 hours PRN Mild Pain (1 - 3), Moderate Pain (4 - 6)  dextrose Oral Gel 15 Gram(s) Oral once PRN Blood Glucose LESS THAN 70 milliGRAM(s)/deciliter  melatonin 3 milliGRAM(s) Oral at bedtime PRN Insomnia      Allergies    lisinopril (Other)    Intolerances        REVIEW OF SYSTEMS:  CONSTITUTIONAL: No fever, weight loss, or fatigue  EYES: No eye pain, visual disturbances, or discharge  ENMT:  No difficulty hearing, tinnitus, vertigo; No sinus or throat pain  NECK: No pain or stiffness  BREASTS: No pain, masses, or nipple discharge  RESPIRATORY: No cough, wheezing, chills or hemoptysis; No shortness of breath  CARDIOVASCULAR: No chest pain, palpitations, dizziness, or leg swelling  GASTROINTESTINAL: No abdominal or epigastric pain. No nausea, vomiting, or hematemesis; No diarrhea or constipation. No melena or hematochezia.  GENITOURINARY: No dysuria, frequency, hematuria, or incontinence  NEUROLOGICAL: No headaches, memory loss, loss of strength, numbness, or tremors  SKIN: No itching, burning, rashes, or lesions   LYMPH NODES: No enlarged glands  ENDOCRINE: No heat or cold intolerance; No hair loss  MUSCULOSKELETAL: No joint pain or swelling; No muscle, back, or extremity pain  PSYCHIATRIC: No depression, anxiety, mood swings, or difficulty sleeping  HEME/LYMPH: No easy bruising, or bleeding gums  ALLERGY AND IMMUNOLOGIC: No hives or eczema    Vital Signs Last 24 Hrs  T(C): 36.5 (25 May 2025 11:06), Max: 37 (24 May 2025 16:29)  T(F): 97.7 (25 May 2025 11:06), Max: 98.6 (24 May 2025 16:29)  HR: 90 (25 May 2025 11:06) (71 - 90)  BP: 128/77 (25 May 2025 11:06) (128/77 - 150/92)  BP(mean): --  RR: 17 (25 May 2025 11:06) (17 - 18)  SpO2: 98% (25 May 2025 11:06) (97% - 99%)    Parameters below as of 25 May 2025 11:06  Patient On (Oxygen Delivery Method): room air        PHYSICAL EXAM:  GENERAL: NAD, well-groomed, well-developed  HEAD:  Atraumatic, Normocephalic  EYES: EOMI, PERRLA, conjunctiva and sclera clear  ENMT: No tonsillar erythema, exudates, or enlargement; Moist mucous membranes, Good dentition, No lesions  NECK: Supple, No JVD, Normal thyroid  NERVOUS SYSTEM:  Alert & Oriented X3, Good concentration; Motor Strength 5/5 B/L upper and lower extremities; DTRs 2+ intact and symmetric  CHEST/LUNG: Clear to ascultation  bilaterally; No rales, rhonchi, wheezing, or rubs  HEART: Regular rate and rhythm; No murmurs, rubs, or gallops  ABDOMEN: Soft, Nontender, Nondistended; Bowel sounds present  EXTREMITIES:  2+ Peripheral Pulses, No clubbing, cyanosis, or edema  LYMPH: No lymphadenopathy noted  SKIN: No rashes or lesions    LABS:                        10.0   8.36  )-----------( 138      ( 24 May 2025 07:15 )             29.4     05-24    141  |  112[H]  |  37[H]  ----------------------------<  92  4.6   |  27  |  2.57[H]    Ca    7.8[L]      24 May 2025 07:15  Phos  5.3     05-24  Mg     2.4     05-24    TPro  6.3  /  Alb  2.4[L]  /  TBili  0.3  /  DBili  x   /  AST  7[L]  /  ALT  16  /  AlkPhos  111  05-24      Urinalysis Basic - ( 24 May 2025 07:15 )    Color: x / Appearance: x / SG: x / pH: x  Gluc: 92 mg/dL / Ketone: x  / Bili: x / Urobili: x   Blood: x / Protein: x / Nitrite: x   Leuk Esterase: x / RBC: x / WBC x   Sq Epi: x / Non Sq Epi: x / Bacteria: x      CAPILLARY BLOOD GLUCOSE      POCT Blood Glucose.: 111 mg/dL (25 May 2025 08:09)  POCT Blood Glucose.: 129 mg/dL (24 May 2025 23:21)      RADIOLOGY & ADDITIONAL TESTS:    Imaging Personally Reviewed:  [ ] YES  [ ] NO    Consultant(s) Notes Reviewed:  [ ] YES  [ ] NO    Care Discussed with Consultants/Other Providers [ ] YES  [ ] NO
HPI:  49 years old female with h/o HTN, HLD, DM, CKD, HF with midrange EF, chronic hypoxic respiratory failure on 2L NC at home, obesity  present to ED with complain of episode of abdominal discomfort. Patient reported today AM, she had episode of abdominal discomfort, tightness of chest and lightheadedness. Denied any chest pain, diaphoresis or LOC. Reported low BP at home.  Noted to be in SVT in ED with HR in 140s, broke with adenosine, afebrile, sat well at RA. No leukocytosis, plt 154, K 7.2---> 7.1, Cr 3.1  ( 2.3 in 01/2025). RUQ ultrasound with no acute pathology. CXR with no focal consolidation.   (22 May 2025 14:19)  Patient is a 49y old  Female who presents with a chief complaint of SVT, DAVID on CKD, hyperkalemia (24 May 2025 14:46)      INTERVAL HPI/OVERNIGHT EVENTS: continues with runs of Vta    MEDICATIONS  (STANDING):  amLODIPine   Tablet 10 milliGRAM(s) Oral daily  apixaban 2.5 milliGRAM(s) Oral every 12 hours  aspirin enteric coated 81 milliGRAM(s) Oral daily  carvedilol 25 milliGRAM(s) Oral every 12 hours  dextrose 5%. 1000 milliLiter(s) (50 mL/Hr) IV Continuous <Continuous>  dextrose 5%. 1000 milliLiter(s) (100 mL/Hr) IV Continuous <Continuous>  dextrose 50% Injectable 25 Gram(s) IV Push once  dextrose 50% Injectable 12.5 Gram(s) IV Push once  dextrose 50% Injectable 25 Gram(s) IV Push once  glucagon  Injectable 1 milliGRAM(s) IntraMuscular once  insulin lispro (ADMELOG) corrective regimen sliding scale   SubCutaneous three times a day before meals  insulin lispro (ADMELOG) corrective regimen sliding scale   SubCutaneous at bedtime    MEDICATIONS  (PRN):  acetaminophen     Tablet .. 650 milliGRAM(s) Oral every 6 hours PRN Mild Pain (1 - 3), Moderate Pain (4 - 6)  dextrose Oral Gel 15 Gram(s) Oral once PRN Blood Glucose LESS THAN 70 milliGRAM(s)/deciliter  melatonin 3 milliGRAM(s) Oral at bedtime PRN Insomnia      Allergies    lisinopril (Other)    Intolerances        REVIEW OF SYSTEMS:   gives no complaints no chest pain     Vital Signs Last 24 Hrs  T(C): 36.5 (25 May 2025 11:06), Max: 37 (24 May 2025 16:29)  T(F): 97.7 (25 May 2025 11:06), Max: 98.6 (24 May 2025 16:29)  HR: 90 (25 May 2025 11:06) (71 - 90)  BP: 128/77 (25 May 2025 11:06) (128/77 - 150/92)  BP(mean): --  RR: 17 (25 May 2025 11:06) (17 - 18)  SpO2: 98% (25 May 2025 11:06) (97% - 99%)    Parameters below as of 25 May 2025 11:06  Patient On (Oxygen Delivery Method): room air        PHYSICAL EXAM:  GENERAL: NAD, obese   HEAD:  Atraumatic, Normocephalic  EYES: EOMI, PERRLA, conjunctiva and sclera clear  ENMT: No tonsillar erythema, exudates, or enlargement; Moist mucous membranes, Good dentition, No lesions  NECK: Supple, No JVD, Normal thyroid  NERVOUS SYSTEM:  Alert & Oriented X3, Good concentration; Motor Strength 5/5 B/L upper and lower extremities; DTRs 2+ intact and symmetric  CHEST/LUNG: Clear to ascultation  bilaterally; No rales, rhonchi, wheezing, or rubs  HEART: Regular rate and rhythm; No murmurs, rubs, or gallops  ABDOMEN: Soft, Nontender, Nondistended; Bowel sounds present  EXTREMITIES:  2+ Peripheral Pulses, No clubbing, cyanosis, or edema  LYMPH: No lymphadenopathy noted  SKIN: No rashes or lesions    LABS:                        10.0   8.36  )-----------( 138      ( 24 May 2025 07:15 )             29.4     05-24    141  |  112[H]  |  37[H]  ----------------------------<  92  4.6   |  27  |  2.57[H]    Ca    7.8[L]      24 May 2025 07:15  Phos  5.3     05-24  Mg     2.4     05-24    TPro  6.3  /  Alb  2.4[L]  /  TBili  0.3  /  DBili  x   /  AST  7[L]  /  ALT  16  /  AlkPhos  111  05-24      Urinalysis Basic - ( 24 May 2025 07:15 )    Color: x / Appearance: x / SG: x / pH: x  Gluc: 92 mg/dL / Ketone: x  / Bili: x / Urobili: x   Blood: x / Protein: x / Nitrite: x   Leuk Esterase: x / RBC: x / WBC x   Sq Epi: x / Non Sq Epi: x / Bacteria: x      CAPILLARY BLOOD GLUCOSE      POCT Blood Glucose.: 111 mg/dL (25 May 2025 08:09)  POCT Blood Glucose.: 129 mg/dL (24 May 2025 23:21)      RADIOLOGY & ADDITIONAL TESTS:    Imaging Personally Reviewed:  [ ] YES  [ ] NO    Consultant(s) Notes Reviewed:  [ ] YES  [ ] NO    Care Discussed with Consultants/Other Providers [ ] YES  [ ] NO
HPI:    49 years old female with h/o HTN, HLD, DM, CKD, HF with midrange EF, chronic hypoxic respiratory failure on 2L NC at home, obesity  present to ED with complain of episode of abdominal discomfort. Patient reported today AM, she had episode of abdominal discomfort, tightness of chest and lightheadedness. Denied any chest pain, diaphoresis or LOC. Reported low BP at home.  Noted to be in SVT in ED with HR in 140s, broke with adenosine, afebrile, sat well at RA. No leukocytosis, plt 154, K 7.2---> 7.1, Cr 3.1  ( 2.3 in 01/2025). RUQ ultrasound with no acute pathology. CXR with no focal consolidation.   (22 May 2025 14:19)      PAST MEDICAL & SURGICAL HISTORY:  Hypertension      Diabetes      Peptic ulcer disease      Chronic systolic congestive heart failure      No significant past surgical history          REVIEW OF SYSTEMS:    CONSTITUTIONAL: No fever, weight loss, or fatigue  EYES: No eye pain, visual disturbances, or discharge  ENMT:  No difficulty hearing, tinnitus, vertigo; No sinus or throat pain  NECK: No pain or stiffness  BREASTS: No pain, masses, or nipple discharge  RESPIRATORY: No cough, wheezing, chills or hemoptysis; No shortness of breath  CARDIOVASCULAR: No chest pain, palpitations, dizziness, or leg swelling  GASTROINTESTINAL: No abdominal or epigastric pain. No nausea, vomiting, or hematemesis; No diarrhea or constipation. No melena or hematochezia.  GENITOURINARY: No dysuria, frequency, hematuria, or incontinence  NEUROLOGICAL: No headaches, memory loss, loss of strength, numbness, or tremors  SKIN: No itching, burning, rashes, or lesions   LYMPH NODES: No enlarged glands  ENDOCRINE: No heat or cold intolerance; No hair loss  MUSCULOSKELETAL: No joint pain or swelling; No muscle, back, or extremity pain  PSYCHIATRIC: No depression, anxiety, mood swings, or difficulty sleeping  HEME/LYMPH: No easy bruising, or bleeding gums  ALLERGY AND IMMUNOLOGIC: No hives or eczema      MEDICATIONS  (STANDING):  amLODIPine   Tablet 10 milliGRAM(s) Oral daily  apixaban 2.5 milliGRAM(s) Oral every 12 hours  aspirin enteric coated 81 milliGRAM(s) Oral daily  carvedilol 25 milliGRAM(s) Oral every 12 hours  dextrose 5%. 1000 milliLiter(s) (50 mL/Hr) IV Continuous <Continuous>  dextrose 5%. 1000 milliLiter(s) (100 mL/Hr) IV Continuous <Continuous>  dextrose 50% Injectable 25 Gram(s) IV Push once  dextrose 50% Injectable 12.5 Gram(s) IV Push once  dextrose 50% Injectable 25 Gram(s) IV Push once  glucagon  Injectable 1 milliGRAM(s) IntraMuscular once  insulin lispro (ADMELOG) corrective regimen sliding scale   SubCutaneous three times a day before meals  insulin lispro (ADMELOG) corrective regimen sliding scale   SubCutaneous at bedtime  sodium chloride 0.9%. 1000 milliLiter(s) (125 mL/Hr) IV Continuous <Continuous>    MEDICATIONS  (PRN):  acetaminophen     Tablet .. 650 milliGRAM(s) Oral every 6 hours PRN Mild Pain (1 - 3), Moderate Pain (4 - 6)  dextrose Oral Gel 15 Gram(s) Oral once PRN Blood Glucose LESS THAN 70 milliGRAM(s)/deciliter  melatonin 3 milliGRAM(s) Oral at bedtime PRN Insomnia      Allergies    lisinopril (Other)    Intolerances        SOCIAL HISTORY:    FAMILY HISTORY:      Vital Signs Last 24 Hrs  T(C): 36.5 (25 May 2025 11:06), Max: 37 (24 May 2025 16:29)  T(F): 97.7 (25 May 2025 11:06), Max: 98.6 (24 May 2025 16:29)  HR: 90 (25 May 2025 11:06) (71 - 90)  BP: 128/77 (25 May 2025 11:06) (128/77 - 150/92)  BP(mean): --  RR: 17 (25 May 2025 11:06) (17 - 18)  SpO2: 98% (25 May 2025 11:06) (97% - 99%)    Parameters below as of 25 May 2025 11:06  Patient On (Oxygen Delivery Method): room air        PHYSICAL EXAM:    GENERAL: NAD, well-groomed, well-developed  HEAD:  Atraumatic, Normocephalic  EYES: EOMI, PERRLA, conjunctiva and sclera clear  ENMT: No tonsillar erythema, exudates, or enlargement; Moist mucous membranes, Good dentition, No lesions  NECK: Supple, No JVD, Normal thyroid  NERVOUS SYSTEM:  Alert & Oriented X3, Good concentration; Motor Strength 5/5 B/L upper and lower extremities; DTRs 2+ intact and symmetric  CHEST/LUNG: Clear to percussion bilaterally; No rales, rhonchi, wheezing, or rubs  HEART: Tachycardia   ABDOMEN: Soft, Nontender, Nondistended; Bowel sounds present  EXTREMITIES:  2+ Peripheral Pulses, No clubbing, cyanosis, or edema  SKIN: No rashes or lesions      LABS:                        10.0   8.36  )-----------( 138      ( 24 May 2025 07:15 )             29.4     05-24    141  |  112[H]  |  37[H]  ----------------------------<  92  4.6   |  27  |  2.57[H]    Ca    7.8[L]      24 May 2025 07:15  Phos  5.3     05-24  Mg     2.4     05-24    TPro  6.3  /  Alb  2.4[L]  /  TBili  0.3  /  DBili  x   /  AST  7[L]  /  ALT  16  /  AlkPhos  111  05-24      Urinalysis Basic - ( 24 May 2025 07:15 )    Color: x / Appearance: x / SG: x / pH: x  Gluc: 92 mg/dL / Ketone: x  / Bili: x / Urobili: x   Blood: x / Protein: x / Nitrite: x   Leuk Esterase: x / RBC: x / WBC x   Sq Epi: x / Non Sq Epi: x / Bacteria: x        RADIOLOGY & ADDITIONAL STUDIES:

## 2025-05-26 ENCOUNTER — TRANSCRIPTION ENCOUNTER (OUTPATIENT)
Age: 50
End: 2025-05-26

## 2025-05-26 VITALS
DIASTOLIC BLOOD PRESSURE: 79 MMHG | HEART RATE: 84 BPM | OXYGEN SATURATION: 99 % | SYSTOLIC BLOOD PRESSURE: 137 MMHG | RESPIRATION RATE: 17 BRPM | TEMPERATURE: 97 F

## 2025-05-26 LAB
ALBUMIN SERPL ELPH-MCNC: 2.4 G/DL — LOW (ref 3.3–5)
ALP SERPL-CCNC: 104 U/L — SIGNIFICANT CHANGE UP (ref 40–120)
ALT FLD-CCNC: 17 U/L — SIGNIFICANT CHANGE UP (ref 12–78)
ANION GAP SERPL CALC-SCNC: 5 MMOL/L — SIGNIFICANT CHANGE UP (ref 5–17)
AST SERPL-CCNC: 9 U/L — LOW (ref 15–37)
BILIRUB SERPL-MCNC: 0.3 MG/DL — SIGNIFICANT CHANGE UP (ref 0.2–1.2)
BUN SERPL-MCNC: 32 MG/DL — HIGH (ref 7–23)
CALCIUM SERPL-MCNC: 8 MG/DL — LOW (ref 8.5–10.1)
CHLORIDE SERPL-SCNC: 113 MMOL/L — HIGH (ref 96–108)
CO2 SERPL-SCNC: 22 MMOL/L — SIGNIFICANT CHANGE UP (ref 22–31)
CREAT SERPL-MCNC: 2.37 MG/DL — HIGH (ref 0.5–1.3)
EGFR: 24 ML/MIN/1.73M2 — LOW
EGFR: 24 ML/MIN/1.73M2 — LOW
GLUCOSE BLDC GLUCOMTR-MCNC: 114 MG/DL — HIGH (ref 70–99)
GLUCOSE SERPL-MCNC: 163 MG/DL — HIGH (ref 70–99)
HCT VFR BLD CALC: 28.1 % — LOW (ref 34.5–45)
HGB BLD-MCNC: 9.6 G/DL — LOW (ref 11.5–15.5)
MAGNESIUM SERPL-MCNC: 2.1 MG/DL — SIGNIFICANT CHANGE UP (ref 1.6–2.6)
MCHC RBC-ENTMCNC: 25.2 PG — LOW (ref 27–34)
MCHC RBC-ENTMCNC: 34.2 G/DL — SIGNIFICANT CHANGE UP (ref 32–36)
MCV RBC AUTO: 73.8 FL — LOW (ref 80–100)
NRBC BLD AUTO-RTO: 0 /100 WBCS — SIGNIFICANT CHANGE UP (ref 0–0)
PHOSPHATE SERPL-MCNC: 3.6 MG/DL — SIGNIFICANT CHANGE UP (ref 2.5–4.5)
PLATELET # BLD AUTO: 142 K/UL — LOW (ref 150–400)
POTASSIUM SERPL-MCNC: 4.2 MMOL/L — SIGNIFICANT CHANGE UP (ref 3.5–5.3)
POTASSIUM SERPL-SCNC: 4.2 MMOL/L — SIGNIFICANT CHANGE UP (ref 3.5–5.3)
PROT SERPL-MCNC: 6.2 GM/DL — SIGNIFICANT CHANGE UP (ref 6–8.3)
RBC # BLD: 3.81 M/UL — SIGNIFICANT CHANGE UP (ref 3.8–5.2)
RBC # FLD: 17.3 % — HIGH (ref 10.3–14.5)
SODIUM SERPL-SCNC: 140 MMOL/L — SIGNIFICANT CHANGE UP (ref 135–145)
WBC # BLD: 7.52 K/UL — SIGNIFICANT CHANGE UP (ref 3.8–10.5)
WBC # FLD AUTO: 7.52 K/UL — SIGNIFICANT CHANGE UP (ref 3.8–10.5)

## 2025-05-26 PROCEDURE — 99239 HOSP IP/OBS DSCHRG MGMT >30: CPT

## 2025-05-26 PROCEDURE — 93010 ELECTROCARDIOGRAM REPORT: CPT

## 2025-05-26 RX ORDER — APIXABAN 2.5 MG/1
1 TABLET, FILM COATED ORAL
Qty: 180 | Refills: 0
Start: 2025-05-26 | End: 2025-08-23

## 2025-05-26 RX ORDER — CARVEDILOL 3.12 MG/1
1 TABLET, FILM COATED ORAL
Qty: 0 | Refills: 0 | DISCHARGE
Start: 2025-05-26

## 2025-05-26 RX ORDER — BUMETANIDE 1 MG/1
1 TABLET ORAL
Qty: 60 | Refills: 0
Start: 2025-05-26 | End: 2025-06-24

## 2025-05-26 RX ADMIN — APIXABAN 2.5 MILLIGRAM(S): 2.5 TABLET, FILM COATED ORAL at 06:35

## 2025-05-26 RX ADMIN — CARVEDILOL 25 MILLIGRAM(S): 3.12 TABLET, FILM COATED ORAL at 06:35

## 2025-05-26 RX ADMIN — APIXABAN 2.5 MILLIGRAM(S): 2.5 TABLET, FILM COATED ORAL at 15:49

## 2025-05-26 NOTE — DISCHARGE NOTE PROVIDER - NSDCMRMEDTOKEN_GEN_ALL_CORE_FT
Albuterol (Eqv-ProAir HFA) 90 mcg/inh inhalation aerosol: 2 puff(s) inhaled every 6 hours as needed for  shortness of breath and/or wheezing  Aldactone 25 mg oral tablet: 1 tab(s) orally once a day  amLODIPine 10 mg oral tablet: 1 tab(s) orally once a day  apixaban 2.5 mg oral tablet: 1 tab(s) orally every 12 hours  aspirin 81 mg oral delayed release tablet: 1 tab(s) orally once a day  bumetanide 2 mg oral tablet: 1 tab(s) orally 2 times a day  carvedilol 25 mg oral tablet: 1 tab(s) orally every 12 hours  Entresto 97 mg-103 mg oral tablet: 1 tab(s) orally 2 times a day  ipratropium-albuterol 0.5 mg-2.5 mg/3 mL inhalation solution: 3 milliliter(s) by nebulizer every 6 hours as needed for  shortness of breath and/or wheezing  Jardiance 25 mg oral tablet: 1 tab(s) orally once a day  nebulizer machine diagnosis acute on chronic resp failure, covid pna, chf: use nebulizer machine q6 prn for sob  Tradjenta 5 mg oral tablet: 1 tab(s) orally once a day

## 2025-05-26 NOTE — DISCHARGE NOTE PROVIDER - NSDCCPCAREPLAN_GEN_ALL_CORE_FT
PRINCIPAL DISCHARGE DIAGNOSIS  Diagnosis: Hyperkalemia  Assessment and Plan of Treatment:       SECONDARY DISCHARGE DIAGNOSES  Diagnosis: DVT, lower extremity  Assessment and Plan of Treatment: please take your eliquis    Diagnosis: Type 2 diabetes mellitus with unspecified complications  Assessment and Plan of Treatment:     Diagnosis: Heart failure with mid-range ejection fraction  Assessment and Plan of Treatment:     Diagnosis: Acute kidney injury superimposed on CKD  Assessment and Plan of Treatment:     Diagnosis: SVT (supraventricular tachycardia)  Assessment and Plan of Treatment:

## 2025-05-26 NOTE — DISCHARGE NOTE NURSING/CASE MANAGEMENT/SOCIAL WORK - NSDCPEFALRISK_GEN_ALL_CORE
For information on Fall & Injury Prevention, visit: [email or web address] OR  [email or web address] OR  https://www.cdc.gov/steadi/patient.html

## 2025-05-26 NOTE — DISCHARGE NOTE PROVIDER - HOSPITAL COURSE
HPI:  49 years old female with h/o HTN, HLD, DM, CKD, HF with midrange EF, chronic hypoxic respiratory failure on 2L NC at home, obesity  present to ED with complain of episode of abdominal discomfort. Patient reported today AM, she had episode of abdominal discomfort, tightness of chest and lightheadedness. Denied any chest pain, diaphoresis or LOC. Reported low BP at home.  Noted to be in SVT in ED with HR in 140s, broke with adenosine, afebrile, sat well at RA. No leukocytosis, plt 154, K 7.2---> 7.1, Cr 3.1  ( 2.3 in 01/2025). RUQ ultrasound with no acute pathology. CXR with no focal consolidation.   (22 May 2025 14:19)  Problem/Plan - 1:  ·  Problem: Hyperkalemia.   ·  Plan: Closely monitor serum  K    continue to monitor and replace as needed.     Problem/Plan - 2:  ·  Problem: Acute kidney injury superimposed on CKD.   ·  Plan: hydrated with improvemennt.     Problem/Plan - 3:  ·  Problem: SVT (supraventricular tachycardia).   ·  Plan: no with VTACH   Broke with adenosine  Likely driven by electrolytes disorder   Telemetry monitoring  Continue coreg 25mg bid  If SVT recurs, will consider cardiology consult  LE doppler positive for DVT  Patient likely with pulmonary embolism however is not requiring oxygen and would not risk further kidney injury.     Problem/Plan - 4:  ·  Problem: Heart failure with mid-range ejection fraction.   ·  Plan: Clinically appear dry  Hold off diuretics given on DAVID on CKD.     Problem/Plan - 5:  ·  Problem: Benign essential HTN.   ·  Plan: monitor BP and titrate BP meds.     Problem/Plan - 6:  ·  Problem: Type 2 diabetes mellitus with unspecified complications.   ·  Plan: ISS, hypoglycemia protocol, A1c.     Problem/Plan - 7:  ·  Problem: DVT, lower extremity.   ·  Plan: on apixaban new medication for her

## 2025-05-26 NOTE — DISCHARGE NOTE PROVIDER - NSDCFUADDAPPT_GEN_ALL_CORE_FT
APPTS ARE READY TO BE MADE: [X] YES    Best Family or Patient Contact (if needed):    Additional Information about above appointments (if needed):    1:   2:   3:     Other comments or requests:    APPTS ARE READY TO BE MADE: [X] YES    Best Family or Patient Contact (if needed):    Additional Information about above appointments (if needed):    1:   2:   3:     Other comments or requests:     Patient advised they did not want to proceed with scheduling appointments with the providers on their referrals. They will coordinate care on their own.

## 2025-05-26 NOTE — DISCHARGE NOTE NURSING/CASE MANAGEMENT/SOCIAL WORK - FINANCIAL ASSISTANCE
[facility] provides services at a reduced cost to those who are determined to be eligible through [facility]’s financial assistance program. Information regarding [facility]’s financial assistance program can be found by going to [email or web address] or by calling [phone].

## 2025-05-26 NOTE — DISCHARGE NOTE NURSING/CASE MANAGEMENT/SOCIAL WORK - PATIENT PORTAL LINK FT
You can access the FollowMyHealth Patient Portal offered by [facility] by registering at the following website: [email or web address]. By joining [employer]’s FollowMyHealth portal, you will also be able to view your health information using other applications (apps) compatible with our system.

## 2025-05-26 NOTE — DISCHARGE NOTE PROVIDER - NSDCFUSCHEDAPPT_GEN_ALL_CORE_FT
Anabell Bagley Physician [address]  Scheduled Appointment: 07/11/2025     [facility] Physician [facility]  [address]  Scheduled Appointment: 07/09/2025

## 2025-05-26 NOTE — DISCHARGE NOTE PROVIDER - ATTENDING DISCHARGE PHYSICAL EXAMINATION:
T(C): 36.3 (05-26-25 @ 11:02), Max: 37.1 (05-25-25 @ 16:58)  HR: 84 (05-26-25 @ 11:02) (67 - 86)  BP: 137/79 (05-26-25 @ 11:02) (137/79 - 169/87)  RR: 17 (05-26-25 @ 11:02) (17 - 18)  SpO2: 99% (05-26-25 @ 11:02) (97% - 99%)    CONSTITUTIONAL: Well groomed, no apparent distress  EYES: PERRLA and symmetric, EOMI, No conjunctival or scleral injection, non-icteric  ENMT: Oral mucosa with moist membranes. Normal dentition; no pharyngeal injection or exudates             NECK: Supple, symmetric and without tracheal deviation   RESP: No respiratory distress, no use of accessory muscles; CTA b/l, no WRR  CV: RRR, +S1S2, no MRG; no JVD; no peripheral edema  GI: Soft, NT, ND, no rebound, no guarding; no palpable masses; no hepatosplenomegaly; no hernia palpated  LYMPH: No cervical LAD or tenderness; no axillary LAD or tenderness; no inguinal LAD or tenderness  MSK: Normal gait; No digital clubbing or cyanosis; examination of the (head/neck/spine/ribs/pelvis, RUE, LUE, RLE, LLE) without misalignment,   Normal ROM without pain, no spinal tenderness, normal muscle strength/tone  SKIN: No rashes or ulcers noted; no subcutaneous nodules or induration palpable  NEURO: CN II-XII intact; normal reflexes in upper and lower extremities, sensation intact in upper and lower extremities b/l to light touch   PSYCH: Appropriate insight/judgment; A+O x 3, mood and affect appropriate, recent/remote memory intact

## 2025-06-03 DIAGNOSIS — N17.9 ACUTE KIDNEY FAILURE, UNSPECIFIED: ICD-10-CM

## 2025-06-03 DIAGNOSIS — N18.4 CHRONIC KIDNEY DISEASE, STAGE 4 (SEVERE): ICD-10-CM

## 2025-06-03 DIAGNOSIS — I13.0 HYPERTENSIVE HEART AND CHRONIC KIDNEY DISEASE WITH HEART FAILURE AND STAGE 1 THROUGH STAGE 4 CHRONIC KIDNEY DISEASE, OR UNSPECIFIED CHRONIC KIDNEY DISEASE: ICD-10-CM

## 2025-06-03 DIAGNOSIS — E11.22 TYPE 2 DIABETES MELLITUS WITH DIABETIC CHRONIC KIDNEY DISEASE: ICD-10-CM

## 2025-06-03 DIAGNOSIS — I50.22 CHRONIC SYSTOLIC (CONGESTIVE) HEART FAILURE: ICD-10-CM

## 2025-06-03 DIAGNOSIS — Z79.899 OTHER LONG TERM (CURRENT) DRUG THERAPY: ICD-10-CM

## 2025-06-03 DIAGNOSIS — I82.452 ACUTE EMBOLISM AND THROMBOSIS OF LEFT PERONEAL VEIN: ICD-10-CM

## 2025-06-03 DIAGNOSIS — I47.10 SUPRAVENTRICULAR TACHYCARDIA, UNSPECIFIED: ICD-10-CM

## 2025-06-03 DIAGNOSIS — Z79.82 LONG TERM (CURRENT) USE OF ASPIRIN: ICD-10-CM

## 2025-06-03 DIAGNOSIS — Z99.81 DEPENDENCE ON SUPPLEMENTAL OXYGEN: ICD-10-CM

## 2025-06-03 DIAGNOSIS — Z88.8 ALLERGY STATUS TO OTHER DRUGS, MEDICAMENTS AND BIOLOGICAL SUBSTANCES: ICD-10-CM

## 2025-06-03 DIAGNOSIS — E87.5 HYPERKALEMIA: ICD-10-CM

## 2025-06-20 ENCOUNTER — APPOINTMENT (OUTPATIENT)
Dept: HEART FAILURE | Facility: CLINIC | Age: 50
End: 2025-06-20

## 2025-06-20 VITALS
HEART RATE: 76 BPM | HEIGHT: 66 IN | DIASTOLIC BLOOD PRESSURE: 94 MMHG | SYSTOLIC BLOOD PRESSURE: 168 MMHG | TEMPERATURE: 98.2 F | WEIGHT: 225 LBS | OXYGEN SATURATION: 98 % | BODY MASS INDEX: 36.16 KG/M2

## 2025-06-20 PROCEDURE — G2211 COMPLEX E/M VISIT ADD ON: CPT | Mod: NC

## 2025-06-20 PROCEDURE — 99214 OFFICE O/P EST MOD 30 MIN: CPT

## 2025-06-20 RX ORDER — APIXABAN 2.5 MG/1
2.5 TABLET, FILM COATED ORAL
Qty: 60 | Refills: 0 | Status: ACTIVE | COMMUNITY
Start: 2025-06-20

## 2025-07-09 ENCOUNTER — APPOINTMENT (OUTPATIENT)
Dept: CV DIAGNOSITCS | Facility: HOSPITAL | Age: 50
End: 2025-07-09